# Patient Record
Sex: MALE | Race: ASIAN | NOT HISPANIC OR LATINO
[De-identification: names, ages, dates, MRNs, and addresses within clinical notes are randomized per-mention and may not be internally consistent; named-entity substitution may affect disease eponyms.]

---

## 2022-05-24 ENCOUNTER — APPOINTMENT (OUTPATIENT)
Dept: GASTROENTEROLOGY | Facility: CLINIC | Age: 67
End: 2022-05-24
Payer: MEDICAID

## 2022-05-24 VITALS
OXYGEN SATURATION: 98 % | DIASTOLIC BLOOD PRESSURE: 74 MMHG | SYSTOLIC BLOOD PRESSURE: 138 MMHG | WEIGHT: 158 LBS | HEIGHT: 64 IN | HEART RATE: 77 BPM | BODY MASS INDEX: 26.98 KG/M2

## 2022-05-24 DIAGNOSIS — F17.200 NICOTINE DEPENDENCE, UNSPECIFIED, UNCOMPLICATED: ICD-10-CM

## 2022-05-24 DIAGNOSIS — E11.9 TYPE 2 DIABETES MELLITUS W/OUT COMPLICATIONS: ICD-10-CM

## 2022-05-24 DIAGNOSIS — I25.10 ATHEROSCLEROTIC HEART DISEASE OF NATIVE CORONARY ARTERY W/OUT ANGINA PECTORIS: ICD-10-CM

## 2022-05-24 DIAGNOSIS — Z78.9 OTHER SPECIFIED HEALTH STATUS: ICD-10-CM

## 2022-05-24 DIAGNOSIS — M79.0 RHEUMATISM, UNSPECIFIED: ICD-10-CM

## 2022-05-24 PROBLEM — Z00.00 ENCOUNTER FOR PREVENTIVE HEALTH EXAMINATION: Status: ACTIVE | Noted: 2022-05-24

## 2022-05-24 PROCEDURE — 99204 OFFICE O/P NEW MOD 45 MIN: CPT

## 2022-05-25 NOTE — REVIEW OF SYSTEMS
[As Noted in HPI] : as noted in HPI [Dizziness] : dizziness [Fever] : no fever [Eyesight Problems] : no eyesight problems [Nosebleeds] : no nosebleeds [Nasal Discharge] : no nasal discharge [Chest Pain] : no chest pain [Cough] : no cough [SOB on Exertion] : no shortness of breath during exertion [Constipation] : no constipation [Hesitancy] : no urinary hesitancy [Nocturia] : no nocturia [Limb Pain] : no limb pain [Limb Swelling] : no limb swelling [Anxiety] : no anxiety [Muscle Weakness] : no muscle weakness [Easy Bleeding] : no tendency for easy bleeding [Easy Bruising] : no tendency for easy bruising

## 2022-05-25 NOTE — ASSESSMENT
[FreeTextEntry1] : 1.nausea and chronic gerd\par \par plan ppi daily\par  egd when cleared by cardiology and pulmonary, has audible wheezing\par \par 2. average risk for colon cancer recommend colonoscopy after clearance obtained

## 2022-05-25 NOTE — HISTORY OF PRESENT ILLNESS
[FreeTextEntry1] : 68 yo male with h/o DM ,CAD s/p stent 2014  on asa , patient reports early satiety and gas on pepcid. patient lost weight don't know how much over few years.  no nausea and vomiting. chronic constipation, no brbpr, no melena\par \par no h/o  egd/colonoscopy\par no family h/o colon or gastric cancer.

## 2022-05-25 NOTE — PHYSICAL EXAM
[General Appearance - Alert] : alert [General Appearance - In No Acute Distress] : in no acute distress [General Appearance - Well Nourished] : well nourished [General Appearance - Well Developed] : well developed [Sclera] : the sclera and conjunctiva were normal [Hearing Threshold Finger Rub Not [place]] : hearing was normal [Respiration, Rhythm And Depth] : normal respiratory rhythm and effort [Auscultation Breath Sounds / Voice Sounds] : lungs were clear to auscultation bilaterally [FreeTextEntry1] : audible wheezing [Heart Sounds] : normal S1 and S2 [Bowel Sounds] : normal bowel sounds [Abdomen Soft] : soft [Abdomen Tenderness] : non-tender [No CVA Tenderness] : no ~M costovertebral angle tenderness [Abnormal Walk] : normal gait [Nail Clubbing] : no clubbing  or cyanosis of the fingernails [Skin Color & Pigmentation] : normal skin color and pigmentation [] : no rash [Skin Lesions] : no skin lesions [No Focal Deficits] : no focal deficits [Oriented To Time, Place, And Person] : oriented to person, place, and time

## 2022-05-25 NOTE — REASON FOR VISIT
[Initial Evaluation] : an initial evaluation [Family Member] : family member [FreeTextEntry1] : abdominal pain

## 2023-01-05 ENCOUNTER — INPATIENT (INPATIENT)
Facility: HOSPITAL | Age: 68
LOS: 7 days | Discharge: HOME | End: 2023-01-13
Attending: STUDENT IN AN ORGANIZED HEALTH CARE EDUCATION/TRAINING PROGRAM | Admitting: STUDENT IN AN ORGANIZED HEALTH CARE EDUCATION/TRAINING PROGRAM
Payer: MEDICAID

## 2023-01-05 VITALS
WEIGHT: 154.32 LBS | HEART RATE: 79 BPM | RESPIRATION RATE: 18 BRPM | TEMPERATURE: 98 F | SYSTOLIC BLOOD PRESSURE: 158 MMHG | DIASTOLIC BLOOD PRESSURE: 82 MMHG | OXYGEN SATURATION: 100 %

## 2023-01-05 LAB
ALBUMIN SERPL ELPH-MCNC: 4 G/DL — SIGNIFICANT CHANGE UP (ref 3.3–5)
ALP SERPL-CCNC: 127 U/L — HIGH (ref 40–120)
ALT FLD-CCNC: 22 U/L — SIGNIFICANT CHANGE UP (ref 4–41)
ANION GAP SERPL CALC-SCNC: 16 MMOL/L — HIGH (ref 7–14)
AST SERPL-CCNC: 17 U/L — SIGNIFICANT CHANGE UP (ref 4–40)
BASE EXCESS BLDV CALC-SCNC: -9 MMOL/L — LOW (ref -2–3)
BILIRUB SERPL-MCNC: 0.2 MG/DL — SIGNIFICANT CHANGE UP (ref 0.2–1.2)
BLOOD GAS VENOUS COMPREHENSIVE RESULT: SIGNIFICANT CHANGE UP
BUN SERPL-MCNC: 16 MG/DL — SIGNIFICANT CHANGE UP (ref 7–23)
CALCIUM SERPL-MCNC: 9 MG/DL — SIGNIFICANT CHANGE UP (ref 8.4–10.5)
CHLORIDE BLDV-SCNC: 106 MMOL/L — SIGNIFICANT CHANGE UP (ref 96–108)
CHLORIDE SERPL-SCNC: 104 MMOL/L — SIGNIFICANT CHANGE UP (ref 98–107)
CO2 BLDV-SCNC: 19.3 MMOL/L — LOW (ref 22–26)
CO2 SERPL-SCNC: 18 MMOL/L — LOW (ref 22–31)
CREAT SERPL-MCNC: 1.07 MG/DL — SIGNIFICANT CHANGE UP (ref 0.5–1.3)
EGFR: 75 ML/MIN/1.73M2 — SIGNIFICANT CHANGE UP
FLUAV AG NPH QL: DETECTED
FLUBV AG NPH QL: SIGNIFICANT CHANGE UP
GAS PNL BLDV: 139 MMOL/L — SIGNIFICANT CHANGE UP (ref 136–145)
GLUCOSE BLDV-MCNC: 479 MG/DL — CRITICAL HIGH (ref 70–99)
GLUCOSE SERPL-MCNC: 343 MG/DL — HIGH (ref 70–99)
HCO3 BLDV-SCNC: 18 MMOL/L — LOW (ref 22–29)
HCT VFR BLD CALC: 37.9 % — LOW (ref 39–50)
HCT VFR BLDA CALC: 36 % — LOW (ref 39–51)
HGB BLD CALC-MCNC: 12.1 G/DL — LOW (ref 13–17)
HGB BLD-MCNC: 12.8 G/DL — LOW (ref 13–17)
LACTATE BLDV-MCNC: 5.9 MMOL/L — CRITICAL HIGH (ref 0.5–2)
MCHC RBC-ENTMCNC: 28.8 PG — SIGNIFICANT CHANGE UP (ref 27–34)
MCHC RBC-ENTMCNC: 33.8 GM/DL — SIGNIFICANT CHANGE UP (ref 32–36)
MCV RBC AUTO: 85.2 FL — SIGNIFICANT CHANGE UP (ref 80–100)
NRBC # BLD: 0 /100 WBCS — SIGNIFICANT CHANGE UP (ref 0–0)
NRBC # FLD: 0 K/UL — SIGNIFICANT CHANGE UP (ref 0–0)
PCO2 BLDV: 42 MMHG — SIGNIFICANT CHANGE UP (ref 42–55)
PH BLDV: 7.24 — LOW (ref 7.32–7.43)
PLATELET # BLD AUTO: 297 K/UL — SIGNIFICANT CHANGE UP (ref 150–400)
PO2 BLDV: 60 MMHG — SIGNIFICANT CHANGE UP
POTASSIUM BLDV-SCNC: 3.6 MMOL/L — SIGNIFICANT CHANGE UP (ref 3.5–5.1)
POTASSIUM SERPL-MCNC: 3.8 MMOL/L — SIGNIFICANT CHANGE UP (ref 3.5–5.3)
POTASSIUM SERPL-SCNC: 3.8 MMOL/L — SIGNIFICANT CHANGE UP (ref 3.5–5.3)
PROT SERPL-MCNC: 8.6 G/DL — HIGH (ref 6–8.3)
RBC # BLD: 4.45 M/UL — SIGNIFICANT CHANGE UP (ref 4.2–5.8)
RBC # FLD: 15.2 % — HIGH (ref 10.3–14.5)
RSV RNA NPH QL NAA+NON-PROBE: SIGNIFICANT CHANGE UP
SAO2 % BLDV: 84.7 % — SIGNIFICANT CHANGE UP
SARS-COV-2 RNA SPEC QL NAA+PROBE: SIGNIFICANT CHANGE UP
SODIUM SERPL-SCNC: 138 MMOL/L — SIGNIFICANT CHANGE UP (ref 135–145)
TROPONIN T, HIGH SENSITIVITY RESULT: 29 NG/L — SIGNIFICANT CHANGE UP
WBC # BLD: 13.28 K/UL — HIGH (ref 3.8–10.5)
WBC # FLD AUTO: 13.28 K/UL — HIGH (ref 3.8–10.5)

## 2023-01-05 PROCEDURE — 99285 EMERGENCY DEPT VISIT HI MDM: CPT

## 2023-01-05 PROCEDURE — 71045 X-RAY EXAM CHEST 1 VIEW: CPT | Mod: 26

## 2023-01-05 RX ORDER — IPRATROPIUM/ALBUTEROL SULFATE 18-103MCG
3 AEROSOL WITH ADAPTER (GRAM) INHALATION ONCE
Refills: 0 | Status: COMPLETED | OUTPATIENT
Start: 2023-01-05 | End: 2023-01-05

## 2023-01-05 RX ORDER — INSULIN HUMAN 100 [IU]/ML
5 INJECTION, SOLUTION SUBCUTANEOUS ONCE
Refills: 0 | Status: COMPLETED | OUTPATIENT
Start: 2023-01-05 | End: 2023-01-05

## 2023-01-05 RX ORDER — SODIUM CHLORIDE 9 MG/ML
500 INJECTION INTRAMUSCULAR; INTRAVENOUS; SUBCUTANEOUS ONCE
Refills: 0 | Status: COMPLETED | OUTPATIENT
Start: 2023-01-05 | End: 2023-01-05

## 2023-01-05 RX ORDER — SODIUM CHLORIDE 9 MG/ML
1000 INJECTION INTRAMUSCULAR; INTRAVENOUS; SUBCUTANEOUS ONCE
Refills: 0 | Status: COMPLETED | OUTPATIENT
Start: 2023-01-05 | End: 2023-01-05

## 2023-01-05 RX ORDER — MAGNESIUM SULFATE 500 MG/ML
2 VIAL (ML) INJECTION ONCE
Refills: 0 | Status: COMPLETED | OUTPATIENT
Start: 2023-01-05 | End: 2023-01-05

## 2023-01-05 RX ORDER — DEXAMETHASONE 0.5 MG/5ML
6 ELIXIR ORAL ONCE
Refills: 0 | Status: COMPLETED | OUTPATIENT
Start: 2023-01-05 | End: 2023-01-05

## 2023-01-05 RX ADMIN — Medication 6 MILLIGRAM(S): at 20:12

## 2023-01-05 RX ADMIN — Medication 150 GRAM(S): at 21:31

## 2023-01-05 RX ADMIN — Medication 3 MILLILITER(S): at 21:32

## 2023-01-05 RX ADMIN — Medication 3 MILLILITER(S): at 21:45

## 2023-01-05 RX ADMIN — SODIUM CHLORIDE 500 MILLILITER(S): 9 INJECTION INTRAMUSCULAR; INTRAVENOUS; SUBCUTANEOUS at 20:12

## 2023-01-05 RX ADMIN — SODIUM CHLORIDE 500 MILLILITER(S): 9 INJECTION INTRAMUSCULAR; INTRAVENOUS; SUBCUTANEOUS at 22:00

## 2023-01-05 RX ADMIN — Medication 3 MILLILITER(S): at 22:00

## 2023-01-05 NOTE — ED PROVIDER NOTE - ATTENDING CONTRIBUTION TO CARE
I performed a face-to-face evaluation of the patient and performed a history and physical examination. I agree with the history and physical examination. If this was a PA visit, I personally saw the patient with the PA and performed a substantive portion of the visit including all aspects of the medical decision making.    History of asthma, diabetes, and coronary artery disease. Has one stent. Presents with 2 to 3 days of shortness of breath, wheezing, and cough. Nausea. No vomiting. Also has left side of chest pain. Medication‘s are metformin 1 g twice daily, Reglan, 10 mg every night for gastroparesis, Singulair 10 mg daily, Norvasc, 10 mg daily, clonidine, 0.1 mg twice daily, losartan 100 mg daily, Glyburide, 5 mg twice daily, Januvia, 100 mg daily, albuterol inhaler, Symbicort 160 mg/4.5 mg. The patient has never been hospitalized for asthma. Given diffuse, wheezing and absence of leg swelling, suspect, asthma exacerbation. Treat with nebulizers, steroids, magnesium. Chest x-ray. RVP. Admit.

## 2023-01-05 NOTE — ED PROVIDER NOTE - OBJECTIVE STATEMENT
John: History of asthma, diabetes, and coronary artery disease. Has one stent. Presents with 2 to 3 days of shortness of breath, wheezing, and cough. Nausea. No vomiting. Also has left side of chest pain. Medication‘s are metformin 1 g twice daily, Reglan, 10 mg every night for gastroparesis, Singulair 10 mg daily, Norvasc, 10 mg daily, clonidine, 0.1 mg twice daily, losartan 100 mg daily, Glyburide, 5 mg twice daily, Januvia, 100 mg daily, albuterol inhaler, Symbicort 160 mg/4.5 mg. The patient has never been hospitalized for asthma.

## 2023-01-05 NOTE — ED PROVIDER NOTE - EKG ADDITIONAL INFORMATION FREE TEXT
John: No hyper-acute T waves, no malignant dysrhythmia, no ischemic ST segment changes. Tachycardic.

## 2023-01-05 NOTE — ED PROVIDER NOTE - PHYSICAL EXAMINATION
Ill-appearing, well nourished, awake, alert, oriented to person, place, time/situation and in mild respiratory distress.    Airway patent. Neck supple.    Eyes without scleral injection. No jaundice.    Strong pulse. Tachycardic.    Respirations labored. Diffuse wheezing.    Abdomen soft, non-tender, no guarding.    Spine appears normal, range of motion is not limited, no muscle or joint tenderness. No LE edema.     Alert and oriented, no gross motor or sensory deficits.    Skin normal color for race, warm, dry and intact. No evidence of rash.    No SI/HI.

## 2023-01-05 NOTE — ED PROVIDER NOTE - CLINICAL SUMMARY MEDICAL DECISION MAKING FREE TEXT BOX
John: History of asthma, diabetes, and coronary artery disease. Has one stent. Presents with 2 to 3 days of shortness of breath, wheezing, and cough. Nausea. No vomiting. Also has left side of chest pain. Medication‘s are metformin 1 g twice daily, Reglan, 10 mg every night for gastroparesis, Singulair 10 mg daily, Norvasc, 10 mg daily, clonidine, 0.1 mg twice daily, losartan 100 mg daily, Glyburide, 5 mg twice daily, Januvia, 100 mg daily, albuterol inhaler, Symbicort 160 mg/4.5 mg. The patient has never been hospitalized for asthma. Given diffuse, wheezing and absence of leg swelling, suspect, asthma exacerbation. Treat with nebulizers, steroids, magnesium. Chest x-ray. RVP. Admit.

## 2023-01-05 NOTE — ED PROVIDER NOTE - PROGRESS NOTE DETAILS
Kushal: Pt seen and eval at [facility] triage. Pt, Occitan speaking, has a h/o asthma, DM, HTN, CAD s/p cath/stent in 2015 p/w two to three days of SSCP, SOB and cough. He denies fever/chills, abd pain, n/v, diaphoresis.  exam: Mild tachypnea, PERRL/EOMI, supple, RRR, poor airway movement with exp wheezing; Abd-soft, NT/ND, no LE edema, +2 DP/PT; A&Ox3, nonfocal  ordered: EKG, labs, steroids, dune, CXR  Care transferred to the red team in the main ED    [facility] [id number] [facility] Me[facility], DO PGY-3: Per son and per medicaiton review pt has no history of afib or AC use other than asa, in afib w/ rvr on ekg Mele Granado, DO PGY-3: Per son and per medicaiton review pt has no history of afib or AC use other than asa, in afib w/ rvr on ekg, will start on heparin, admit to tele pt admitted,  boarding in ED room 1. ENT Dr Concepcion, scoped pt by mistake. PT and son  (Cindy) made aware, escalated to manager Dianne, completed incident report # [id number]  pt no distress at this time. no work of breathing. no neck pain. no drooling. vss.

## 2023-01-05 NOTE — ED ADULT NURSE NOTE - OBJECTIVE STATEMENT
Received 66y/o M Hx asthma to room 1. Received pt with IV access established, and initial workup complete. Pt came to ED with c/o SOB, productive cough, wheezing, nausea, chest tightness x 2 days. Pt on cardiac monitor; noted to be tachycardic & tachypneic. Pt initiated on duoneb TX, as well as Magnesium IV drip as per provider order. Pt A&Ox4. Safety maintained.

## 2023-01-05 NOTE — ED ADULT TRIAGE NOTE - CHIEF COMPLAINT QUOTE
Pt c/o shortness of breath & chest tightness x 2 days w/ productive cough. No relief from inhaler at home. Received 2 duonebs by EMS. O2 sat on room air mid 80s. On NRB pt 100%. PMHx Asthma, HTN, HLD, DM

## 2023-01-06 DIAGNOSIS — A41.9 SEPSIS, UNSPECIFIED ORGANISM: ICD-10-CM

## 2023-01-06 DIAGNOSIS — J45.901 UNSPECIFIED ASTHMA WITH (ACUTE) EXACERBATION: ICD-10-CM

## 2023-01-06 DIAGNOSIS — I16.0 HYPERTENSIVE URGENCY: ICD-10-CM

## 2023-01-06 DIAGNOSIS — I25.10 ATHEROSCLEROTIC HEART DISEASE OF NATIVE CORONARY ARTERY WITHOUT ANGINA PECTORIS: ICD-10-CM

## 2023-01-06 DIAGNOSIS — I48.91 UNSPECIFIED ATRIAL FIBRILLATION: ICD-10-CM

## 2023-01-06 DIAGNOSIS — J10.1 INFLUENZA DUE TO OTHER IDENTIFIED INFLUENZA VIRUS WITH OTHER RESPIRATORY MANIFESTATIONS: ICD-10-CM

## 2023-01-06 DIAGNOSIS — J96.01 ACUTE RESPIRATORY FAILURE WITH HYPOXIA: ICD-10-CM

## 2023-01-06 DIAGNOSIS — E11.65 TYPE 2 DIABETES MELLITUS WITH HYPERGLYCEMIA: ICD-10-CM

## 2023-01-06 DIAGNOSIS — Z29.9 ENCOUNTER FOR PROPHYLACTIC MEASURES, UNSPECIFIED: ICD-10-CM

## 2023-01-06 LAB
ALBUMIN SERPL ELPH-MCNC: 3.6 G/DL — SIGNIFICANT CHANGE UP (ref 3.3–5)
ALP SERPL-CCNC: 109 U/L — SIGNIFICANT CHANGE UP (ref 40–120)
ALT FLD-CCNC: 17 U/L — SIGNIFICANT CHANGE UP (ref 4–41)
ANION GAP SERPL CALC-SCNC: 12 MMOL/L — SIGNIFICANT CHANGE UP (ref 7–14)
APTT BLD: 26.3 SEC — LOW (ref 27–36.3)
AST SERPL-CCNC: 11 U/L — SIGNIFICANT CHANGE UP (ref 4–40)
BILIRUB SERPL-MCNC: 0.2 MG/DL — SIGNIFICANT CHANGE UP (ref 0.2–1.2)
BLOOD GAS VENOUS COMPREHENSIVE RESULT: SIGNIFICANT CHANGE UP
BUN SERPL-MCNC: 19 MG/DL — SIGNIFICANT CHANGE UP (ref 7–23)
CALCIUM SERPL-MCNC: 9 MG/DL — SIGNIFICANT CHANGE UP (ref 8.4–10.5)
CHLORIDE SERPL-SCNC: 104 MMOL/L — SIGNIFICANT CHANGE UP (ref 98–107)
CO2 SERPL-SCNC: 22 MMOL/L — SIGNIFICANT CHANGE UP (ref 22–31)
CREAT SERPL-MCNC: 0.97 MG/DL — SIGNIFICANT CHANGE UP (ref 0.5–1.3)
D DIMER BLD IA.RAPID-MCNC: 265 NG/ML DDU — HIGH
EGFR: 85 ML/MIN/1.73M2 — SIGNIFICANT CHANGE UP
GLUCOSE BLDC GLUCOMTR-MCNC: 304 MG/DL — HIGH (ref 70–99)
GLUCOSE BLDC GLUCOMTR-MCNC: 309 MG/DL — HIGH (ref 70–99)
GLUCOSE BLDC GLUCOMTR-MCNC: 311 MG/DL — HIGH (ref 70–99)
GLUCOSE BLDC GLUCOMTR-MCNC: 360 MG/DL — HIGH (ref 70–99)
GLUCOSE SERPL-MCNC: 301 MG/DL — HIGH (ref 70–99)
INR BLD: 1.06 RATIO — SIGNIFICANT CHANGE UP (ref 0.88–1.16)
LACTATE SERPL-SCNC: 3.6 MMOL/L — HIGH (ref 0.5–2)
MAGNESIUM SERPL-MCNC: 2.2 MG/DL — SIGNIFICANT CHANGE UP (ref 1.6–2.6)
NT-PROBNP SERPL-SCNC: 946 PG/ML — HIGH
PHOSPHATE SERPL-MCNC: 2.5 MG/DL — SIGNIFICANT CHANGE UP (ref 2.5–4.5)
POTASSIUM SERPL-MCNC: 3.6 MMOL/L — SIGNIFICANT CHANGE UP (ref 3.5–5.3)
POTASSIUM SERPL-SCNC: 3.6 MMOL/L — SIGNIFICANT CHANGE UP (ref 3.5–5.3)
PROCALCITONIN SERPL-MCNC: 0.07 NG/ML — SIGNIFICANT CHANGE UP (ref 0.02–0.1)
PROT SERPL-MCNC: 7.5 G/DL — SIGNIFICANT CHANGE UP (ref 6–8.3)
PROTHROM AB SERPL-ACNC: 12.3 SEC — SIGNIFICANT CHANGE UP (ref 10.5–13.4)
SODIUM SERPL-SCNC: 138 MMOL/L — SIGNIFICANT CHANGE UP (ref 135–145)
TROPONIN T, HIGH SENSITIVITY RESULT: 24 NG/L — SIGNIFICANT CHANGE UP

## 2023-01-06 PROCEDURE — 99223 1ST HOSP IP/OBS HIGH 75: CPT

## 2023-01-06 PROCEDURE — 99223 1ST HOSP IP/OBS HIGH 75: CPT | Mod: GC

## 2023-01-06 RX ORDER — SODIUM CHLORIDE 9 MG/ML
1000 INJECTION, SOLUTION INTRAVENOUS
Refills: 0 | Status: DISCONTINUED | OUTPATIENT
Start: 2023-01-06 | End: 2023-01-13

## 2023-01-06 RX ORDER — AMLODIPINE BESYLATE 2.5 MG/1
10 TABLET ORAL DAILY
Refills: 0 | Status: DISCONTINUED | OUTPATIENT
Start: 2023-01-06 | End: 2023-01-13

## 2023-01-06 RX ORDER — IPRATROPIUM/ALBUTEROL SULFATE 18-103MCG
3 AEROSOL WITH ADAPTER (GRAM) INHALATION ONCE
Refills: 0 | Status: COMPLETED | OUTPATIENT
Start: 2023-01-06 | End: 2023-01-06

## 2023-01-06 RX ORDER — INSULIN GLARGINE 100 [IU]/ML
15 INJECTION, SOLUTION SUBCUTANEOUS EVERY MORNING
Refills: 0 | Status: DISCONTINUED | OUTPATIENT
Start: 2023-01-07 | End: 2023-01-07

## 2023-01-06 RX ORDER — HEPARIN SODIUM 5000 [USP'U]/ML
INJECTION INTRAVENOUS; SUBCUTANEOUS
Qty: 25000 | Refills: 0 | Status: DISCONTINUED | OUTPATIENT
Start: 2023-01-06 | End: 2023-01-06

## 2023-01-06 RX ORDER — INSULIN LISPRO 100/ML
VIAL (ML) SUBCUTANEOUS
Refills: 0 | Status: DISCONTINUED | OUTPATIENT
Start: 2023-01-06 | End: 2023-01-11

## 2023-01-06 RX ORDER — DEXTROSE 50 % IN WATER 50 %
15 SYRINGE (ML) INTRAVENOUS ONCE
Refills: 0 | Status: DISCONTINUED | OUTPATIENT
Start: 2023-01-06 | End: 2023-01-13

## 2023-01-06 RX ORDER — HEPARIN SODIUM 5000 [USP'U]/ML
5500 INJECTION INTRAVENOUS; SUBCUTANEOUS EVERY 6 HOURS
Refills: 0 | Status: DISCONTINUED | OUTPATIENT
Start: 2023-01-06 | End: 2023-01-06

## 2023-01-06 RX ORDER — BUDESONIDE AND FORMOTEROL FUMARATE DIHYDRATE 160; 4.5 UG/1; UG/1
2 AEROSOL RESPIRATORY (INHALATION)
Refills: 0 | Status: DISCONTINUED | OUTPATIENT
Start: 2023-01-06 | End: 2023-01-13

## 2023-01-06 RX ORDER — METOCLOPRAMIDE HCL 10 MG
10 TABLET ORAL AT BEDTIME
Refills: 0 | Status: DISCONTINUED | OUTPATIENT
Start: 2023-01-06 | End: 2023-01-13

## 2023-01-06 RX ORDER — INSULIN GLARGINE 100 [IU]/ML
10 INJECTION, SOLUTION SUBCUTANEOUS ONCE
Refills: 0 | Status: COMPLETED | OUTPATIENT
Start: 2023-01-06 | End: 2023-01-06

## 2023-01-06 RX ORDER — GLUCAGON INJECTION, SOLUTION 0.5 MG/.1ML
1 INJECTION, SOLUTION SUBCUTANEOUS ONCE
Refills: 0 | Status: DISCONTINUED | OUTPATIENT
Start: 2023-01-06 | End: 2023-01-13

## 2023-01-06 RX ORDER — DEXTROSE 50 % IN WATER 50 %
25 SYRINGE (ML) INTRAVENOUS ONCE
Refills: 0 | Status: DISCONTINUED | OUTPATIENT
Start: 2023-01-06 | End: 2023-01-13

## 2023-01-06 RX ORDER — HEPARIN SODIUM 5000 [USP'U]/ML
5500 INJECTION INTRAVENOUS; SUBCUTANEOUS ONCE
Refills: 0 | Status: COMPLETED | OUTPATIENT
Start: 2023-01-06 | End: 2023-01-06

## 2023-01-06 RX ORDER — INSULIN GLARGINE 100 [IU]/ML
5 INJECTION, SOLUTION SUBCUTANEOUS ONCE
Refills: 0 | Status: COMPLETED | OUTPATIENT
Start: 2023-01-06 | End: 2023-01-06

## 2023-01-06 RX ORDER — IPRATROPIUM/ALBUTEROL SULFATE 18-103MCG
3 AEROSOL WITH ADAPTER (GRAM) INHALATION EVERY 6 HOURS
Refills: 0 | Status: DISCONTINUED | OUTPATIENT
Start: 2023-01-06 | End: 2023-01-06

## 2023-01-06 RX ORDER — IPRATROPIUM/ALBUTEROL SULFATE 18-103MCG
3 AEROSOL WITH ADAPTER (GRAM) INHALATION EVERY 4 HOURS
Refills: 0 | Status: DISCONTINUED | OUTPATIENT
Start: 2023-01-06 | End: 2023-01-07

## 2023-01-06 RX ORDER — DEXTROSE 50 % IN WATER 50 %
12.5 SYRINGE (ML) INTRAVENOUS ONCE
Refills: 0 | Status: DISCONTINUED | OUTPATIENT
Start: 2023-01-06 | End: 2023-01-13

## 2023-01-06 RX ORDER — INSULIN LISPRO 100/ML
5 VIAL (ML) SUBCUTANEOUS
Refills: 0 | Status: DISCONTINUED | OUTPATIENT
Start: 2023-01-06 | End: 2023-01-07

## 2023-01-06 RX ORDER — HEPARIN SODIUM 5000 [USP'U]/ML
2500 INJECTION INTRAVENOUS; SUBCUTANEOUS EVERY 6 HOURS
Refills: 0 | Status: DISCONTINUED | OUTPATIENT
Start: 2023-01-06 | End: 2023-01-06

## 2023-01-06 RX ORDER — MONTELUKAST 4 MG/1
10 TABLET, CHEWABLE ORAL DAILY
Refills: 0 | Status: DISCONTINUED | OUTPATIENT
Start: 2023-01-06 | End: 2023-01-13

## 2023-01-06 RX ORDER — ENOXAPARIN SODIUM 100 MG/ML
40 INJECTION SUBCUTANEOUS EVERY 24 HOURS
Refills: 0 | Status: DISCONTINUED | OUTPATIENT
Start: 2023-01-06 | End: 2023-01-07

## 2023-01-06 RX ORDER — LOSARTAN POTASSIUM 100 MG/1
100 TABLET, FILM COATED ORAL DAILY
Refills: 0 | Status: DISCONTINUED | OUTPATIENT
Start: 2023-01-06 | End: 2023-01-13

## 2023-01-06 RX ADMIN — Medication 75 MILLIGRAM(S): at 18:33

## 2023-01-06 RX ADMIN — Medication 3 MILLILITER(S): at 22:22

## 2023-01-06 RX ADMIN — Medication 0.1 MILLIGRAM(S): at 18:35

## 2023-01-06 RX ADMIN — Medication 40 MILLIGRAM(S): at 18:33

## 2023-01-06 RX ADMIN — Medication 5 UNIT(S): at 15:00

## 2023-01-06 RX ADMIN — INSULIN HUMAN 5 UNIT(S): 100 INJECTION, SOLUTION SUBCUTANEOUS at 00:26

## 2023-01-06 RX ADMIN — Medication 10 MILLIGRAM(S): at 22:21

## 2023-01-06 RX ADMIN — INSULIN GLARGINE 5 UNIT(S): 100 INJECTION, SOLUTION SUBCUTANEOUS at 03:47

## 2023-01-06 RX ADMIN — LOSARTAN POTASSIUM 100 MILLIGRAM(S): 100 TABLET, FILM COATED ORAL at 15:25

## 2023-01-06 RX ADMIN — HEPARIN SODIUM 5500 UNIT(S): 5000 INJECTION INTRAVENOUS; SUBCUTANEOUS at 05:50

## 2023-01-06 RX ADMIN — Medication 3 MILLILITER(S): at 14:19

## 2023-01-06 RX ADMIN — Medication 5 UNIT(S): at 19:38

## 2023-01-06 RX ADMIN — Medication 0.1 MILLIGRAM(S): at 10:47

## 2023-01-06 RX ADMIN — Medication 75 MILLIGRAM(S): at 05:52

## 2023-01-06 RX ADMIN — Medication 3 MILLILITER(S): at 03:41

## 2023-01-06 RX ADMIN — AMLODIPINE BESYLATE 10 MILLIGRAM(S): 2.5 TABLET ORAL at 15:21

## 2023-01-06 RX ADMIN — Medication 3 MILLILITER(S): at 17:05

## 2023-01-06 RX ADMIN — Medication 3 MILLILITER(S): at 10:28

## 2023-01-06 RX ADMIN — Medication 8: at 14:58

## 2023-01-06 RX ADMIN — Medication 40 MILLIGRAM(S): at 10:46

## 2023-01-06 RX ADMIN — Medication 8: at 22:22

## 2023-01-06 RX ADMIN — HEPARIN SODIUM 1200 UNIT(S)/HR: 5000 INJECTION INTRAVENOUS; SUBCUTANEOUS at 05:53

## 2023-01-06 RX ADMIN — ENOXAPARIN SODIUM 40 MILLIGRAM(S): 100 INJECTION SUBCUTANEOUS at 14:21

## 2023-01-06 RX ADMIN — INSULIN GLARGINE 10 UNIT(S): 100 INJECTION, SOLUTION SUBCUTANEOUS at 15:53

## 2023-01-06 RX ADMIN — Medication 8: at 19:38

## 2023-01-06 RX ADMIN — MONTELUKAST 10 MILLIGRAM(S): 4 TABLET, CHEWABLE ORAL at 15:20

## 2023-01-06 RX ADMIN — Medication 75 MILLIGRAM(S): at 00:27

## 2023-01-06 RX ADMIN — BUDESONIDE AND FORMOTEROL FUMARATE DIHYDRATE 2 PUFF(S): 160; 4.5 AEROSOL RESPIRATORY (INHALATION) at 22:21

## 2023-01-06 NOTE — ED ADULT NURSE REASSESSMENT NOTE - NS ED NURSE REASSESS COMMENT FT1
Pt is resting comfortably on room air SpO2% is 97. Pt states chest pain is 2/10. Labs drawn and IV 20 gauged placed. Will continue to monitor. Pt is resting comfortably on room air SpO2% is 97 with respirations of 20 bpm. Pt states chest pain is improving 2/10 pain, and remains on tele monitor. Labs drawn and IV 20 gauged placed. Will continue to monitor.

## 2023-01-06 NOTE — H&P ADULT - PROBLEM SELECTOR PLAN 6
BP elevated to 200s  - Resume home Clonidine 0.1mg BID, Losartan 100mg qD and Amlodipine 10mg qD w/hold parameters   - Would also add Hydralazine 10mg IV q6h prn for SBP >180

## 2023-01-06 NOTE — H&P ADULT - NSHPPHYSICALEXAM_GEN_ALL_CORE
.  VITAL SIGNS:  T(C): 36.4 (01-06-23 @ 02:07), Max: 36.8 (01-05-23 @ 17:10)  T(F): 97.6 (01-06-23 @ 02:07), Max: 98.2 (01-05-23 @ 17:10)  HR: 89 (01-06-23 @ 07:55) (69 - 135)  BP: 184/89 (01-06-23 @ 02:07) (139/99 - 184/89)  BP(mean): --  RR: 14 (01-06-23 @ 05:45) (14 - 18)  SpO2: 100% (01-06-23 @ 07:55) (96% - 100%)  Wt(kg): --    PHYSICAL EXAM:    Constitutional: WDWN resting comfortably in bed; NAD  Head: NC/AT  Eyes: PERRL, EOMI, clear conjunctiva  ENT: no nasal discharge; uvula midline, no oropharyngeal erythema or exudates; MMM  Neck: supple; no JVD or thyromegaly  Respiratory: CTA B/L; no W/R/R, no retractions  Cardiac: +S1/S2; RRR; no M/R/G; PMI non-displaced  Gastrointestinal: soft, NT/ND; no rebound or guarding; +BSx4  Genitourinary: normal external genitalia  Back: spine midline, no bony tenderness or step-offs; no CVAT B/L  Extremities: WWP, no clubbing or cyanosis; no peripheral edema  Musculoskeletal: NROM x4; no joint swelling, tenderness or erythema  Vascular: 2+ radial, femoral, DP/PT pulses B/L  Dermatologic: skin warm, dry and intact; no rashes, wounds, or scars  Lymphatic: no submandibular or cervical LAD  Neurologic: AAOx3; CNII-XII grossly intact; no focal deficits  Psychiatric: affect and characteristics of appearance, verbalizations, behaviors are appropriate .  VITAL SIGNS:  T(C): 36.4 (01-06-23 @ 02:07), Max: 36.8 (01-05-23 @ 17:10)  T(F): 97.6 (01-06-23 @ 02:07), Max: 98.2 (01-05-23 @ 17:10)  HR: 89 (01-06-23 @ 07:55) (69 - 135)  BP: 184/89 (01-06-23 @ 02:07) (139/99 - 184/89)  BP(mean): --  RR: 14 (01-06-23 @ 05:45) (14 - 18)  SpO2: 100% (01-06-23 @ 07:55) (96% - 100%)  Wt(kg): --    PHYSICAL EXAM:    Constitutional: mildly uncomfortable, sitting in stretcher, not on oxygen   Head: NC/AT  Eyes: PERRL, EOMI, clear conjunctiva  ENT: no nasal discharge; poor dentition  Neck: supple; no JVD  Respiratory: diffuse expiratory wheezing, tachypneic, no accessory muscle use.    Cardiac: +S1/S2; RRR; no M/R/G  Gastrointestinal: soft, NT/ND; no rebound or guarding; +BSx4  Back: spine midline, no bony tenderness or step-offs; no CVAT B/L  Extremities: WWP, no clubbing or cyanosis; no peripheral edema  Musculoskeletal: NROM x4; no joint swelling, tenderness or erythema  Vascular: 2+ radial, femoral, DP/PT pulses B/L  Dermatologic: skin warm, dry   Neurologic: AAOx3; CNII-XII grossly intact; no focal deficits  Psychiatric: affect and characteristics of appearance, verbalizations, behaviors are appropriate

## 2023-01-06 NOTE — CONSULT NOTE ADULT - SUBJECTIVE AND OBJECTIVE BOX
Cardiovascular Disease Initial Evaluation  Date of service: 01-06-23 @ 10:30    CHIEF COMPLAINT: SOB    HISTORY OF PRESENT ILLNESS:  Mr. Crareon is a 67M with history of CAD s/p PCI circa 2014 in [facility], HTN, Asthma, and DM who presents with SOB for the past week. Son is at bedside who assisted with history. Pt has been having increasing SOB associated with nausea and chest pain over the past 2 days which prompted his visit to the ED. Pt admits that he has been using his rescue inhaler more frequently over the past several days with minimal relief. Upon arrival pt was found to be hypoxic. Pt was also found to be in rapid afib. Mr. Carreon was immediately placed on HFNC with improvement in oxygenation and restoration of sinus rhythm on telemetry. Cardiology consulted for further management.     OF note, pt follows with cardiologist Dr. Gomes. Son states that he has a stress done one month ago and it was normal.       Allergies    No Known Allergies    Intolerances    	    MEDICATIONS:  cloNIDine 0.1 milliGRAM(s) Oral once  heparin   Injectable 5500 Unit(s) IV Push every 6 hours PRN  heparin   Injectable 2500 Unit(s) IV Push every 6 hours PRN  heparin  Infusion.  Unit(s)/Hr IV Continuous <Continuous>  losartan 100 milliGRAM(s) Oral daily    oseltamivir 75 milliGRAM(s) Oral two times a day    albuterol/ipratropium for Nebulization 3 milliLiter(s) Nebulizer every 6 hours  montelukast 10 milliGRAM(s) Oral daily        dextrose 50% Injectable 25 Gram(s) IV Push once  dextrose 50% Injectable 12.5 Gram(s) IV Push once  dextrose 50% Injectable 25 Gram(s) IV Push once  dextrose Oral Gel 15 Gram(s) Oral once PRN  glucagon  Injectable 1 milliGRAM(s) IntraMuscular once  insulin lispro (ADMELOG) corrective regimen sliding scale   SubCutaneous Before meals and at bedtime  methylPREDNISolone sodium succinate Injectable 40 milliGRAM(s) IV Push two times a day  methylPREDNISolone sodium succinate IVPB 40 milliGRAM(s) IV Intermittent once    dextrose 5%. 1000 milliLiter(s) IV Continuous <Continuous>  dextrose 5%. 1000 milliLiter(s) IV Continuous <Continuous>      PAST MEDICAL & SURGICAL HISTORY:  Type 2 diabetes mellitus      CAD (coronary artery disease)      Asthma      Essential hypertension          FAMILY HISTORY:      SOCIAL HISTORY:    The patient is a nonsmoker       REVIEW OF SYSTEMS:  See HPI, otherwise complete 14 point review of systems negative    [ x] All others negative	  [ ] Unable to obtain    PHYSICAL EXAM:  T(C): 37.1 (01-06-23 @ 08:57), Max: 37.1 (01-06-23 @ 08:57)  HR: 77 (01-06-23 @ 08:57) (69 - 135)  BP: 152/68 (01-06-23 @ 08:57) (139/99 - 184/89)  RR: 20 (01-06-23 @ 08:57) (14 - 20)  SpO2: 98% (01-06-23 @ 08:57) (96% - 100%)  Wt(kg): --  I&O's Summary      Appearance: Pt in mild distress. Conversational dyspnea   HEENT:  Normal oral mucosa, PERRL, EOMI	  Cardiovascular: Normal S1 S2, No JVD, No murmurs/rubs/gallops  Respiratory: Audible diffuse wheezing throughout lung fields.   Gastrointestinal:  Soft, Non-tender, + BS	  Skin: No rashes, No ecchymoses, No cyanosis	  Neurologic: Non-focal; no weakness  Extremities: No clubbing, cyanosis or edema  Vascular: Peripheral pulses palpable 2+ bilaterally  Psychiatry: A & O x 3, Mood & affect appropriate    Laboratory Data:	 	    CBC Full  -  ( 05 Jan 2023 20:12 )  WBC Count : 13.28 K/uL  Hemoglobin : 12.8 g/dL  Hematocrit : 37.9 %  Platelet Count - Automated : 297 K/uL  Mean Cell Volume : 85.2 fL  Mean Cell Hemoglobin : 28.8 pg  Mean Cell Hemoglobin Concentration : 33.8 gm/dL  Auto Neutrophil # : x  Auto Lymphocyte # : x  Auto Monocyte # : x  Auto Eosinophil # : x  Auto Basophil # : x  Auto Neutrophil % : x  Auto Lymphocyte % : x  Auto Monocyte % : x  Auto Eosinophil % : x  Auto Basophil % : x    01-05    138  |  104  |  16  ----------------------------<  343<H>  3.8   |  18<L>  |  1.07    Ca    9.0      05 Jan 2023 20:12  Mg     2.70     01-05    TPro  8.6<H>  /  Alb  4.0  /  TBili  0.2  /  DBili  x   /  AST  17  /  ALT  22  /  AlkPhos  127<H>  01-05      proBNP:   Lipid Profile:   HgA1c:   TSH:       CARDIAC MARKERS: Trop T 29-24            Interpretation of Telemetry: Sinus     ECG: Afib RVR  RADIOLOGY:  OTHER: 	    PREVIOUS DIAGNOSTIC TESTING:    [ ] Echocardiogram:  [ ] Catheterization:  [ ] Stress Test:  	    Assessment:  Mr. Carreon is a 67M with history of CAD s/p PCI circa 2014 in [facility], HTN, Asthma, and DM who presents with SOB for the past week.    Plan of Care:    #Afib RVR  - In setting of hypoxia  - Now resolved, currently in sinus on telemetry  - Would not commit patient to long term anticoagulation at this time unless there is recurrence     #HTN urgency  - BP elevated  - Pt has not received antihypertensive medications today which include, Clonidine 0.1mg BID, Norvasc 10mg daily and Losartan 100mg daily  - Recommend resumption of home medications immediately  - Would also add Hydralazine 10mg IV q6h prn for SBP >180    #Hypoxic respiratory failure  - 2/2 influenza  - Pt now off HFNC on room air  - Management as per primary team    #CAD  - S/p PCI in 2014   - ASA 81mg daily  - Obtain TTE    #ACP (advance care planning)-  Advanced care planning was discussed with the patient and son.  Risks, benefits and alternatives of medical treatment and procedures were discussed in detail and all questions were answered. 30 additional minutes spent addressing advance care plans.      72 minutes spent on total encounter; more than 50% of the visit was spent counseling and/or coordinating care by the attending physician.   	  Glenn Louis DO [facility]  Cardiovascular Diseases  [phone]

## 2023-01-06 NOTE — ED ADULT NURSE REASSESSMENT NOTE - NS ED NURSE REASSESS COMMENT FT1
Pt is a 67 year old male AxO4 complaining of SOB and chest pain for the past 2 days. He rates the chest pain as a 4 out of 10. PMH of Asthma, HTN, HLD, and DM. The Patient is on room air with SpO2 of 98. He denies any N/V, abdominal pain, headache or dizziness. Skin is warm dry and intact. Expiatory wheezing noted on auscultated breath sounds. Pt recently diagnosed with A-fibb and the flu. Heparin drip currently running in the left IV site. On Cardiac monitor. Will continue to monitor the patient. Pt is a 67 year old male AxO4 complaining of SOB and chest pain for the past 2 days. He rates the chest pain as a 4/10. PMH of Asthma, HTN, HLD, and DM. The Patient is on room air with SpO2 of 98. He denies any N/V, abdominal pain, headache or dizziness. Skin is warm dry and intact. Expiatory wheezing noted on auscultated breath sounds. Pt recently diagnosed with A-fibb and the flu. Heparin drip currently running in the left IV site. On Cardiac monitor. Will continue to monitor the patient. Pt on droplet precautions. Pt is a 67 year old male AxO4 complaining of SOB and chest pain for the past 2 days. He rates the chest pain as a 4/10. PMH of Asthma, HTN, HLD, and DM. The Patient is on room air with SpO2 of 98, and RR at 20 breaths per minute. Expiatory wheezing noted on auscultated breath sounds He denies any N/V, abdominal pain, headache or dizziness. Skin is warm dry and intact. Pt recently diagnosed with A-fibb (pt on tele monitor) and the flu. Heparin drip currently running in the left IV site. Will continue to monitor the patient. Pt on droplet precautions.

## 2023-01-06 NOTE — H&P ADULT - HISTORY OF PRESENT ILLNESS
67M w/asthma, diabetes, HTN, CAD s/p 1 stent, presents with 2 to 3 days of shortness of breath, wheezing, and cough. Reports nausea, no vomiting and left sided of chest pain.   Son at bedside to translate per pt preference. Cindy ([phone])  67M w/asthma, diabetes, HTN, CAD s/p 1 stent, presents with 2 to 3 days of shortness of breath, wheezing, and cough. Reports nausea, no vomiting and left sided of chest pain.   Per son, pt sob started 2-3 days ago and he started using ProAir more with minor relief. Chest pain has no resolved.  Normally he, uses his ProAir from time to time if he gets sob, used to see pulmonologist, but overall asthma has been controlled on Symbicort and Singulair.   Pt was brought to ER via EMS on NRB, documentation shows desaturation to 80s. Pt was given 2 500cc boluses, Duoneb x5, Decadron 6mg x1 and insulin 5U for hyperglycemia.   Pt started on heparin gtt for rapid atrial fibrillation.

## 2023-01-06 NOTE — H&P ADULT - PROBLEM SELECTOR PLAN 3
Per chart, pt hypoxic to 80s with EMS, was on NRB. Was also on HFNC earlier? but now 98% on room air, but still tachypneic w/diffuse wheezing   -suspect asthma exacerbation, but will obtain D-dimer and Pro-BNP  -CXR reviewed   -s/p Duoneb and Decadron in ER  -C/w IV Solumedrol 40mg BID, Singulair, Symbicort and Duoneb  -Will need ambulatory sat  -Pulm consulted, f/u recs

## 2023-01-06 NOTE — H&P ADULT - PROBLEM SELECTOR PLAN 7
Home meds: Glyburide and Metformin, Reported A1c ~8.5   -Hyperglycemic in ER, likely iso sepsis and steroid use.   -Start Lantus and Admelog   -Son reports gastroparesis, c/w Reglan Home meds: Glyburide and Metformin, Reported A1c ~8.5   -Hyperglycemic in ER, likely iso sepsis and steroid use.   -STAT Lantus 10U -->  ordered for 15U in AM, adjust as needed   -Start  Admelog 5U TID before meals  -Son reports gastroparesis, c/w Reglan

## 2023-01-06 NOTE — H&P ADULT - NSICDXPASTMEDICALHX_GEN_ALL_CORE_FT
PAST MEDICAL HISTORY:  Asthma     CAD (coronary artery disease)     Essential hypertension     Type 2 diabetes mellitus

## 2023-01-06 NOTE — H&P ADULT - NSHPLABSRESULTS_GEN_ALL_CORE
.  LABS:                         12.8   13.28 )-----------( 297      ( 05 Jan 2023 20:12 )             37.9     01-05    138  |  104  |  16  ----------------------------<  343<H>  3.8   |  18<L>  |  1.07    Ca    9.0      05 Jan 2023 20:12  Mg     2.70     01-05    TPro  8.6<H>  /  Alb  4.0  /  TBili  0.2  /  DBili  x   /  AST  17  /  ALT  22  /  AlkPhos  127<H>  01-05    PT/INR - ( 06 Jan 2023 04:11 )   PT: 12.3 sec;   INR: 1.06 ratio         PTT - ( 06 Jan 2023 04:11 )  PTT:26.3 sec              RADIOLOGY, EKG & ADDITIONAL TESTS: Reviewed by me  EKG: rapid afib, 110 nonspecific twave changes  CXR: no focal consolidation, patchiness b/l lower lobes

## 2023-01-06 NOTE — ED ADULT NURSE REASSESSMENT NOTE - NS ED NURSE REASSESS COMMENT FT1
Pt is resting on room air SpO2 at 98,  AxO4 Second dose of albuterol nebulizer was administered as per ordered. Pt on tele A-fib. Pt is tachypneic at times, but he states it has improved.  RR remain at 20 breaths per minute. Will continue to monitor the patient.

## 2023-01-06 NOTE — H&P ADULT - ASSESSMENT
.  LABS:                         12.8   13.28 )-----------( 297      ( 05 Jan 2023 20:12 )             37.9     01-05    138  |  104  |  16  ----------------------------<  343<H>  3.8   |  18<L>  |  1.07    Ca    9.0      05 Jan 2023 20:12  Mg     2.70     01-05    TPro  8.6<H>  /  Alb  4.0  /  TBili  0.2  /  DBili  x   /  AST  17  /  ALT  22  /  AlkPhos  127<H>  01-05    PT/INR - ( 06 Jan 2023 04:11 )   PT: 12.3 sec;   INR: 1.06 ratio         PTT - ( 06 Jan 2023 04:11 )  PTT:26.3 sec              RADIOLOGY, EKG & ADDITIONAL TESTS: Reviewed by me  EKG:   CXR: no focal consolidation, patchiness b/l lower lobes Yes 67M w/asthma, diabetes, HTN, CAD s/p 1 stent, presents with 2 to 3 days of shortness of breath, wheezing, and cough, admitted with severe sepsis 2/2 influenza A, hypoxic respiratory failure, and rapid atrial fibrillation

## 2023-01-06 NOTE — H&P ADULT - PROBLEM SELECTOR PLAN 1
2/2 influenza, tachycardiac, tachypneic and hypoxic.   -s/p 1L NS in ER  -Lactate elevated 5.9--> 4.6, repeat pending  -C/w Tamiflu  -C/w oxygen as needed

## 2023-01-06 NOTE — ED ADULT NURSE REASSESSMENT NOTE - NS ED NURSE REASSESS COMMENT FT1
Break RN- Patient received in stretcher. Awake. Respirations even and unlabored. Spontaneous movement of all extremities noted. Respirations even and unlabored. Oxygen saturation WNL. Able to speak full complete sentences without difficulty. Airway open patent and unobstructed isolation precautions remain in place. Comfort and safety maintained. All current care needs met. Care plan continued Jenae GUZMAN

## 2023-01-06 NOTE — H&P ADULT - NSHPREVIEWOFSYSTEMS_GEN_ALL_CORE
REVIEW OF SYSTEMS:    CONSTITUTIONAL: No weakness, fevers or chills  EYES/ENT: No visual changes;  No vertigo or throat pain   NECK: No pain or stiffness  RESPIRATORY: +cough, wheezing, and shortness of breath, no hemoptysis  CARDIOVASCULAR: No chest pain or palpitations  GASTROINTESTINAL: No abdominal or epigastric pain. No nausea, vomiting, or hematemesis; No diarrhea or constipation. No melena or hematochezia.  GENITOURINARY: No dysuria, frequency or hematuria  NEUROLOGICAL: No numbness or weakness  SKIN: No itching, rashes

## 2023-01-06 NOTE — CONSULT NOTE ADULT - SUBJECTIVE AND OBJECTIVE BOX
SUBJECTIVE  CONSULT QUESTION: Asthma Exacerbation ico Influenza A+  SUMMARY OF HOSPITALIZATION / HPI  Mr. Carreon is a 65F with h/o       PAST MEDICAL/SURGICAL HISTORY  PAST MEDICAL & SURGICAL HISTORY:  Type 2 diabetes mellitus  CAD (coronary artery disease)  Asthma  Essential hypertension    FAMILY HISTORY:      SOCIAL HISTORY:  Smoking:   EtOH Use:  Marital Status:  Occupation:  Recent Travel:  Advance Directives:    ALLERGIES  NKDA    HOME MEDICATIONS:      HOSPITAL MEDICATIONS:  MEDICATIONS  (STANDING):  albuterol/ipratropium for Nebulization 3 milliLiter(s) Nebulizer every 6 hours  amLODIPine   Tablet 10 milliGRAM(s) Oral daily  budesonide 160 MICROgram(s)/formoterol 4.5 MICROgram(s) Inhaler 2 Puff(s) Inhalation two times a day  cloNIDine 0.1 milliGRAM(s) Oral two times a day  dextrose 5%. 1000 milliLiter(s) (100 mL/Hr) IV Continuous <Continuous>  dextrose 5%. 1000 milliLiter(s) (50 mL/Hr) IV Continuous <Continuous>  dextrose 50% Injectable 25 Gram(s) IV Push once  dextrose 50% Injectable 12.5 Gram(s) IV Push once  dextrose 50% Injectable 25 Gram(s) IV Push once  enoxaparin Injectable 40 milliGRAM(s) SubCutaneous every 24 hours  glucagon  Injectable 1 milliGRAM(s) IntraMuscular once  insulin glargine Injectable (LANTUS) 10 Unit(s) SubCutaneous once  insulin lispro (ADMELOG) corrective regimen sliding scale   SubCutaneous Before meals and at bedtime  insulin lispro Injectable (ADMELOG) 5 Unit(s) SubCutaneous three times a day before meals  losartan 100 milliGRAM(s) Oral daily  methylPREDNISolone sodium succinate Injectable 40 milliGRAM(s) IV Push two times a day  metoclopramide 10 milliGRAM(s) Oral at bedtime  montelukast 10 milliGRAM(s) Oral daily  oseltamivir 75 milliGRAM(s) Oral two times a day    MEDICATIONS  (PRN):  dextrose Oral Gel 15 Gram(s) Oral once PRN Blood Glucose LESS THAN 70 milliGRAM(s)/deciliter      REVIEW OF SYSTEMS:  [ ] All other systems negative  [ ] Unable to assess ROS because ________    OBJECTIVE:  VITAL SIGNS  ICU Vital Signs Last 24 Hrs  T(C): 36.1 (06 Jan 2023 12:14), Max: 37.1 (06 Jan 2023 08:57)  T(F): 97 (06 Jan 2023 12:14), Max: 98.7 (06 Jan 2023 08:57)  HR: 69 (06 Jan 2023 12:14) (69 - 135)  BP: 137/69 (06 Jan 2023 12:14) (137/69 - 184/89)  BP(mean): --  ABP: --  ABP(mean): --  RR: 20 (06 Jan 2023 12:14) (14 - 20)  SpO2: 97% (06 Jan 2023 12:14) (96% - 100%)    O2 Parameters below as of 06 Jan 2023 12:14  Patient On (Oxygen Delivery Method): room air    PHYSICAL EXAM:  GEN: Awake, AOx3, NAD.  HEENT: NCAT  ---EYES: no scleral icterus, EOMI, PERRLA  CARDIO: RRR. Normal S1/S2, no m/r/g. No JVD.  RESP: CTAB  ABD: Soft, NTND. BS+. No masses, no hepatosplenomegaly.  : No CVAT.   MSK: No obvious deformity or ROM deficit. 2+ pulses x4. No edema.  SKIN: Warm, dry. No rashes. Nail beds without cyanosis or clubbing.  NEURO: Moves all four extremities spontaneously  PSYCH: Appropriate mood & affect.           LAB DATA                        12.8   13.28 )-----------( 297      ( 05 Jan 2023 20:12 )             37.9     01-05    138  |  104  |  16  ----------------------------<  343<H>  3.8   |  18<L>  |  1.07    Ca    9.0      05 Jan 2023 20:12  Mg     2.70     01-05    TPro  8.6<H>  /  Alb  4.0  /  TBili  0.2  /  DBili  x   /  AST  17  /  ALT  22  /  AlkPhos  127<H>  01-05    PT/INR - ( 06 Jan 2023 04:11 )   PT: 12.3 sec;   INR: 1.06 ratio         PTT - ( 06 Jan 2023 04:11 )  PTT:26.3 sec    pH, Venous: 7.28 (01-06-23 @ 00:30)  HCO3, Venous: 17 mmol/L (01-06-23 @ 00:30)  pCO2, Venous: 36 mmHg (01-06-23 @ 00:30)  pO2, Venous: 53 mmHg (01-06-23 @ 00:30)    Blood Gas Venous - Lactate: 4.6 mmol/L (01-06-23 @ 00:30)  Blood Gas Venous - Lactate: 5.9 mmol/L (01-05-23 @ 22:40)      CAPILLARY BLOOD GLUCOSE      POCT Blood Glucose.: 304 mg/dL (06 Jan 2023 09:25)      ADDITIONAL TESTS & IMAGING  RADIOLOGY:  < from: Xray Chest 1 View- PORTABLE-Urgent (Xray Chest 1 View- PORTABLE-Urgent .) (01.05.23 @ 23:04) >  FINDINGS:    The lungs are clear. No pleural effusion or pneumothorax.  Cardiomediastinal silhouette is poorly evaluated on this projection.  Osseous degenerative changes.    IMPRESSION:    Clear lungs.    < end of copied text >    MICROBIOLOGY:         CARDIOLOGY DATA:     SUBJECTIVE  CONSULT QUESTION: Asthma Exacerbation ico Influenza A+  SUMMARY OF HOSPITALIZATION / HPI  [facility]  #[id number] (Buzz)  Mr. Carreon is a 65F with h/o DM, HTN, CAD (s/p stent), Asthma who presents with 5 days of shortness of breath and 3 days of chest pain and found to have influenza A. He says that he's never been     In the ED, he was noted to have diffuse wheeze and started on standing DuoNebs.       PAST MEDICAL/SURGICAL HISTORY  PAST MEDICAL & SURGICAL HISTORY:  Type 2 diabetes mellitus  CAD (coronary artery disease)  Asthma  Essential hypertension    FAMILY HISTORY:      SOCIAL HISTORY:  Smoking:   EtOH Use:  Marital Status:  Occupation:  Recent Travel:  Advance Directives:    ALLERGIES  NKDA    HOME MEDICATIONS:      HOSPITAL MEDICATIONS:  MEDICATIONS  (STANDING):  albuterol/ipratropium for Nebulization 3 milliLiter(s) Nebulizer every 6 hours  amLODIPine   Tablet 10 milliGRAM(s) Oral daily  budesonide 160 MICROgram(s)/formoterol 4.5 MICROgram(s) Inhaler 2 Puff(s) Inhalation two times a day  cloNIDine 0.1 milliGRAM(s) Oral two times a day  dextrose 5%. 1000 milliLiter(s) (100 mL/Hr) IV Continuous <Continuous>  dextrose 5%. 1000 milliLiter(s) (50 mL/Hr) IV Continuous <Continuous>  dextrose 50% Injectable 25 Gram(s) IV Push once  dextrose 50% Injectable 12.5 Gram(s) IV Push once  dextrose 50% Injectable 25 Gram(s) IV Push once  enoxaparin Injectable 40 milliGRAM(s) SubCutaneous every 24 hours  glucagon  Injectable 1 milliGRAM(s) IntraMuscular once  insulin glargine Injectable (LANTUS) 10 Unit(s) SubCutaneous once  insulin lispro (ADMELOG) corrective regimen sliding scale   SubCutaneous Before meals and at bedtime  insulin lispro Injectable (ADMELOG) 5 Unit(s) SubCutaneous three times a day before meals  losartan 100 milliGRAM(s) Oral daily  methylPREDNISolone sodium succinate Injectable 40 milliGRAM(s) IV Push two times a day  metoclopramide 10 milliGRAM(s) Oral at bedtime  montelukast 10 milliGRAM(s) Oral daily  oseltamivir 75 milliGRAM(s) Oral two times a day    MEDICATIONS  (PRN):  dextrose Oral Gel 15 Gram(s) Oral once PRN Blood Glucose LESS THAN 70 milliGRAM(s)/deciliter      REVIEW OF SYSTEMS:  [ ] All other systems negative  [ ] Unable to assess ROS because ________    OBJECTIVE:  VITAL SIGNS  ICU Vital Signs Last 24 Hrs  T(C): 36.1 (06 Jan 2023 12:14), Max: 37.1 (06 Jan 2023 08:57)  T(F): 97 (06 Jan 2023 12:14), Max: 98.7 (06 Jan 2023 08:57)  HR: 69 (06 Jan 2023 12:14) (69 - 135)  BP: 137/69 (06 Jan 2023 12:14) (137/69 - 184/89)  BP(mean): --  ABP: --  ABP(mean): --  RR: 20 (06 Jan 2023 12:14) (14 - 20)  SpO2: 97% (06 Jan 2023 12:14) (96% - 100%)    O2 Parameters below as of 06 Jan 2023 12:14  Patient On (Oxygen Delivery Method): room air    PHYSICAL EXAM:  GEN: Awake, AOx3, NAD.  HEENT: NCAT  ---EYES: no scleral icterus, EOMI, PERRLA  CARDIO: RRR. Normal S1/S2, no m/r/g. No JVD.  RESP: CTAB  ABD: Soft, NTND. BS+. No masses, no hepatosplenomegaly.  : No CVAT.   MSK: No obvious deformity or ROM deficit. 2+ pulses x4. No edema.  SKIN: Warm, dry. No rashes. Nail beds without cyanosis or clubbing.  NEURO: Moves all four extremities spontaneously  PSYCH: Appropriate mood & affect.           LAB DATA                        12.8   13.28 )-----------( 297      ( 05 Jan 2023 20:12 )             37.9     01-05    138  |  104  |  16  ----------------------------<  343<H>  3.8   |  18<L>  |  1.07    Ca    9.0      05 Jan 2023 20:12  Mg     2.70     01-05    TPro  8.6<H>  /  Alb  4.0  /  TBili  0.2  /  DBili  x   /  AST  17  /  ALT  22  /  AlkPhos  127<H>  01-05    PT/INR - ( 06 Jan 2023 04:11 )   PT: 12.3 sec;   INR: 1.06 ratio         PTT - ( 06 Jan 2023 04:11 )  PTT:26.3 sec    pH, Venous: 7.28 (01-06-23 @ 00:30)  HCO3, Venous: 17 mmol/L (01-06-23 @ 00:30)  pCO2, Venous: 36 mmHg (01-06-23 @ 00:30)  pO2, Venous: 53 mmHg (01-06-23 @ 00:30)    Blood Gas Venous - Lactate: 4.6 mmol/L (01-06-23 @ 00:30)  Blood Gas Venous - Lactate: 5.9 mmol/L (01-05-23 @ 22:40)      CAPILLARY BLOOD GLUCOSE      POCT Blood Glucose.: 304 mg/dL (06 Jan 2023 09:25)      ADDITIONAL TESTS & IMAGING  RADIOLOGY:  < from: Xray Chest 1 View- PORTABLE-Urgent (Xray Chest 1 View- PORTABLE-Urgent .) (01.05.23 @ 23:04) >  FINDINGS:    The lungs are clear. No pleural effusion or pneumothorax.  Cardiomediastinal silhouette is poorly evaluated on this projection.  Osseous degenerative changes.    IMPRESSION:    Clear lungs.    < end of copied text >    MICROBIOLOGY:         CARDIOLOGY DATA:     SUBJECTIVE  CONSULT QUESTION: Asthma Exacerbation ico Influenza A+  SUMMARY OF HOSPITALIZATION / HPI  [facility]  #[id number] (Buzz)  Mr. Carreon is a 65F with h/o DM, HTN, CAD (s/p stent), Asthma who presents with 5 days of shortness of breath and 3 days of chest pain and found to have influenza A. He says that he's never been hospitalized or intubated for asthma. He says that his CP does not feel like his past anginal episodes (prior to his "heart procedure").     In the ED, he was noted to have diffuse wheeze and started on standing DuoNebs. He was also noted to be in AFib with rates ~110s. He has no diagnosis of AFib in the past.       PAST MEDICAL/SURGICAL HISTORY  PAST MEDICAL & SURGICAL HISTORY:  Type 2 diabetes mellitus  CAD (coronary artery disease)  Asthma  Essential hypertension    ALLERGIES  NKDA    HOME MEDICATIONS:      HOSPITAL MEDICATIONS:  MEDICATIONS  (STANDING):  albuterol/ipratropium for Nebulization 3 milliLiter(s) Nebulizer every 6 hours  amLODIPine   Tablet 10 milliGRAM(s) Oral daily  budesonide 160 MICROgram(s)/formoterol 4.5 MICROgram(s) Inhaler 2 Puff(s) Inhalation two times a day  cloNIDine 0.1 milliGRAM(s) Oral two times a day  dextrose 5%. 1000 milliLiter(s) (100 mL/Hr) IV Continuous <Continuous>  dextrose 5%. 1000 milliLiter(s) (50 mL/Hr) IV Continuous <Continuous>  dextrose 50% Injectable 25 Gram(s) IV Push once  dextrose 50% Injectable 12.5 Gram(s) IV Push once  dextrose 50% Injectable 25 Gram(s) IV Push once  enoxaparin Injectable 40 milliGRAM(s) SubCutaneous every 24 hours  glucagon  Injectable 1 milliGRAM(s) IntraMuscular once  insulin glargine Injectable (LANTUS) 10 Unit(s) SubCutaneous once  insulin lispro (ADMELOG) corrective regimen sliding scale   SubCutaneous Before meals and at bedtime  insulin lispro Injectable (ADMELOG) 5 Unit(s) SubCutaneous three times a day before meals  losartan 100 milliGRAM(s) Oral daily  methylPREDNISolone sodium succinate Injectable 40 milliGRAM(s) IV Push two times a day  metoclopramide 10 milliGRAM(s) Oral at bedtime  montelukast 10 milliGRAM(s) Oral daily  oseltamivir 75 milliGRAM(s) Oral two times a day    MEDICATIONS  (PRN):  dextrose Oral Gel 15 Gram(s) Oral once PRN Blood Glucose LESS THAN 70 milliGRAM(s)/deciliter      REVIEW OF SYSTEMS:  [x] All other systems negative  [ ] Unable to assess ROS because ________    OBJECTIVE:  VITAL SIGNS  ICU Vital Signs Last 24 Hrs  T(C): 36.1 (06 Jan 2023 12:14), Max: 37.1 (06 Jan 2023 08:57)  T(F): 97 (06 Jan 2023 12:14), Max: 98.7 (06 Jan 2023 08:57)  HR: 69 (06 Jan 2023 12:14) (69 - 135)  BP: 137/69 (06 Jan 2023 12:14) (137/69 - 184/89)  BP(mean): --  ABP: --  ABP(mean): --  RR: 20 (06 Jan 2023 12:14) (14 - 20)  SpO2: 97% (06 Jan 2023 12:14) (96% - 100%)    O2 Parameters below as of 06 Jan 2023 12:14  Patient On (Oxygen Delivery Method): room air    PHYSICAL EXAM:  GEN: Awake, AOx3, NAD.  HEENT: NCAT  ---EYES: no scleral icterus  CARDIO: tachycardic, regular rhythm at this time  RESP: Diffuse wheeze with >3s expiratory phase (STAT DuoNeb ordered); decreased aeration R > L; speaking in 3-4 word phrases  ABD: Soft, NTND.   MSK: No obvious deformity or ROM deficit. 2+ pulses x4. No edema.  SKIN: Warm, dry. No rashes.  NEURO: Moves all four extremities spontaneously  PSYCH: Appropriate mood & affect.     LAB DATA                        12.8   13.28 )-----------( 297      ( 05 Jan 2023 20:12 )             37.9     01-05    138  |  104  |  16  ----------------------------<  343<H>  3.8   |  18<L>  |  1.07    Ca    9.0      05 Jan 2023 20:12  Mg     2.70     01-05    TPro  8.6<H>  /  Alb  4.0  /  TBili  0.2  /  DBili  x   /  AST  17  /  ALT  22  /  AlkPhos  127<H>  01-05    PT/INR - ( 06 Jan 2023 04:11 )   PT: 12.3 sec;   INR: 1.06 ratio         PTT - ( 06 Jan 2023 04:11 )  PTT:26.3 sec    pH, Venous: 7.28 (01-06-23 @ 00:30)  HCO3, Venous: 17 mmol/L (01-06-23 @ 00:30)  pCO2, Venous: 36 mmHg (01-06-23 @ 00:30)  pO2, Venous: 53 mmHg (01-06-23 @ 00:30)    Blood Gas Venous - Lactate: 4.6 mmol/L (01-06-23 @ 00:30)  Blood Gas Venous - Lactate: 5.9 mmol/L (01-05-23 @ 22:40)      CAPILLARY BLOOD GLUCOSE      POCT Blood Glucose.: 304 mg/dL (06 Jan 2023 09:25)      ADDITIONAL TESTS & IMAGING  RADIOLOGY:  < from: Xray Chest 1 View- PORTABLE-Urgent (Xray Chest 1 View- PORTABLE-Urgent .) (01.05.23 @ 23:04) >  FINDINGS:    The lungs are clear. No pleural effusion or pneumothorax.  Cardiomediastinal silhouette is poorly evaluated on this projection.  Osseous degenerative changes.    IMPRESSION:    Clear lungs.    < end of copied text >    MICROBIOLOGY:   Influenza A+      CARDIOLOGY DATA:  AFib with rate 110, full upload to follow, most likely

## 2023-01-06 NOTE — CONSULT NOTE ADULT - ASSESSMENT
*** INCOMPLETE ***  SYNTHESIS  Mr. Carreon is a 65F with h/o       SUMMARY OF RECOMMENDATIONS SYNTHESIS  #Asthma Exacerbation 2/2 Influenza A  Mr. Carreon is a 65F with h/o Asthma, HTN, CAD (s/p 1 stent, dates unknown), DM (on Januvia, Glipizide) who presents with shortness of breath, wheezing 2/2 likely asthma exacerbation ico Influenza A+. On initial evaluation, his O2 sat was 93-94% at rest, down to 88-90% upon sitting upw ith diffuse wheeze, which seems to have responded well to Duonebs on reassessment a few hours later. He does have lactic acidemia, which is likely at least in part driven by his Duoneb therapy, and thankfully, his gas may be improving. He should continue on Duoneb/Steroid treatment for now given his trigger is likely Influenza.     SUMMARY OF RECOMMENDATIONS  - Recommend INCREASE Duonebs to Q4H SCHEDULED  - Recommend CONTINUE Methylprednisolone 40mg IV BID (will titrate to clinical response)  - If pt condition worsens, would consider Magnesium 2g IV for acute asthma exacerbation  - If pt condition worsens, could also consider NIPPV if increased WOB.  - Continue with Influenza management as you are doing    Calderon Quijano MD PGY-2  Case D/W Dr. Schmidt

## 2023-01-06 NOTE — H&P ADULT - PROBLEM SELECTOR PLAN 2
2/2 sepsis and hypoxia  - Now resolved, currently in sinus on telemetry  - discontinued heparin gtt   - seen by cardiology, can hold off on AC unless there is long term recurrence

## 2023-01-07 LAB
A1C WITH ESTIMATED AVERAGE GLUCOSE RESULT: 8.4 % — HIGH (ref 4–5.6)
ANION GAP SERPL CALC-SCNC: 13 MMOL/L — SIGNIFICANT CHANGE UP (ref 7–14)
BASOPHILS # BLD AUTO: 0.01 K/UL — SIGNIFICANT CHANGE UP (ref 0–0.2)
BASOPHILS NFR BLD AUTO: 0.1 % — SIGNIFICANT CHANGE UP (ref 0–2)
BUN SERPL-MCNC: 23 MG/DL — SIGNIFICANT CHANGE UP (ref 7–23)
CALCIUM SERPL-MCNC: 8.8 MG/DL — SIGNIFICANT CHANGE UP (ref 8.4–10.5)
CHLORIDE SERPL-SCNC: 105 MMOL/L — SIGNIFICANT CHANGE UP (ref 98–107)
CO2 SERPL-SCNC: 21 MMOL/L — LOW (ref 22–31)
CREAT SERPL-MCNC: 0.94 MG/DL — SIGNIFICANT CHANGE UP (ref 0.5–1.3)
EGFR: 88 ML/MIN/1.73M2 — SIGNIFICANT CHANGE UP
EOSINOPHIL # BLD AUTO: 0 K/UL — SIGNIFICANT CHANGE UP (ref 0–0.5)
EOSINOPHIL NFR BLD AUTO: 0 % — SIGNIFICANT CHANGE UP (ref 0–6)
ESTIMATED AVERAGE GLUCOSE: 194 — SIGNIFICANT CHANGE UP
GLUCOSE BLDC GLUCOMTR-MCNC: 208 MG/DL — HIGH (ref 70–99)
GLUCOSE BLDC GLUCOMTR-MCNC: 212 MG/DL — HIGH (ref 70–99)
GLUCOSE BLDC GLUCOMTR-MCNC: 287 MG/DL — HIGH (ref 70–99)
GLUCOSE BLDC GLUCOMTR-MCNC: 307 MG/DL — HIGH (ref 70–99)
GLUCOSE BLDC GLUCOMTR-MCNC: 445 MG/DL — HIGH (ref 70–99)
GLUCOSE BLDC GLUCOMTR-MCNC: 446 MG/DL — HIGH (ref 70–99)
GLUCOSE SERPL-MCNC: 214 MG/DL — HIGH (ref 70–99)
HCT VFR BLD CALC: 31.9 % — LOW (ref 39–50)
HCV AB S/CO SERPL IA: 0.09 S/CO — SIGNIFICANT CHANGE UP (ref 0–0.99)
HCV AB SERPL-IMP: SIGNIFICANT CHANGE UP
HGB BLD-MCNC: 10.9 G/DL — LOW (ref 13–17)
IANC: 13.93 K/UL — HIGH (ref 1.8–7.4)
IMM GRANULOCYTES NFR BLD AUTO: 1.6 % — HIGH (ref 0–0.9)
LACTATE SERPL-SCNC: 2.3 MMOL/L — HIGH (ref 0.5–2)
LYMPHOCYTES # BLD AUTO: 1.47 K/UL — SIGNIFICANT CHANGE UP (ref 1–3.3)
LYMPHOCYTES # BLD AUTO: 8.8 % — LOW (ref 13–44)
MAGNESIUM SERPL-MCNC: 2.1 MG/DL — SIGNIFICANT CHANGE UP (ref 1.6–2.6)
MCHC RBC-ENTMCNC: 28.5 PG — SIGNIFICANT CHANGE UP (ref 27–34)
MCHC RBC-ENTMCNC: 34.2 GM/DL — SIGNIFICANT CHANGE UP (ref 32–36)
MCV RBC AUTO: 83.3 FL — SIGNIFICANT CHANGE UP (ref 80–100)
MONOCYTES # BLD AUTO: 0.99 K/UL — HIGH (ref 0–0.9)
MONOCYTES NFR BLD AUTO: 5.9 % — SIGNIFICANT CHANGE UP (ref 2–14)
NEUTROPHILS # BLD AUTO: 13.93 K/UL — HIGH (ref 1.8–7.4)
NEUTROPHILS NFR BLD AUTO: 83.6 % — HIGH (ref 43–77)
NRBC # BLD: 0 /100 WBCS — SIGNIFICANT CHANGE UP (ref 0–0)
NRBC # FLD: 0 K/UL — SIGNIFICANT CHANGE UP (ref 0–0)
PHOSPHATE SERPL-MCNC: 3.5 MG/DL — SIGNIFICANT CHANGE UP (ref 2.5–4.5)
PLATELET # BLD AUTO: 381 K/UL — SIGNIFICANT CHANGE UP (ref 150–400)
POTASSIUM SERPL-MCNC: 3.5 MMOL/L — SIGNIFICANT CHANGE UP (ref 3.5–5.3)
POTASSIUM SERPL-SCNC: 3.5 MMOL/L — SIGNIFICANT CHANGE UP (ref 3.5–5.3)
RBC # BLD: 3.83 M/UL — LOW (ref 4.2–5.8)
RBC # FLD: 15.4 % — HIGH (ref 10.3–14.5)
SODIUM SERPL-SCNC: 139 MMOL/L — SIGNIFICANT CHANGE UP (ref 135–145)
WBC # BLD: 16.66 K/UL — HIGH (ref 3.8–10.5)
WBC # FLD AUTO: 16.66 K/UL — HIGH (ref 3.8–10.5)

## 2023-01-07 PROCEDURE — 93010 ELECTROCARDIOGRAM REPORT: CPT

## 2023-01-07 PROCEDURE — 99232 SBSQ HOSP IP/OBS MODERATE 35: CPT

## 2023-01-07 RX ORDER — ASPIRIN/CALCIUM CARB/MAGNESIUM 324 MG
81 TABLET ORAL DAILY
Refills: 0 | Status: DISCONTINUED | OUTPATIENT
Start: 2023-01-07 | End: 2023-01-13

## 2023-01-07 RX ORDER — HEPARIN SODIUM 5000 [USP'U]/ML
2500 INJECTION INTRAVENOUS; SUBCUTANEOUS EVERY 6 HOURS
Refills: 0 | Status: DISCONTINUED | OUTPATIENT
Start: 2023-01-07 | End: 2023-01-10

## 2023-01-07 RX ORDER — LEVALBUTEROL 1.25 MG/.5ML
0.63 SOLUTION, CONCENTRATE RESPIRATORY (INHALATION) EVERY 6 HOURS
Refills: 0 | Status: DISCONTINUED | OUTPATIENT
Start: 2023-01-08 | End: 2023-01-13

## 2023-01-07 RX ORDER — DILTIAZEM HCL 120 MG
10 CAPSULE, EXT RELEASE 24 HR ORAL ONCE
Refills: 0 | Status: COMPLETED | OUTPATIENT
Start: 2023-01-07 | End: 2023-01-07

## 2023-01-07 RX ORDER — ACETAMINOPHEN 500 MG
650 TABLET ORAL ONCE
Refills: 0 | Status: COMPLETED | OUTPATIENT
Start: 2023-01-07 | End: 2023-01-07

## 2023-01-07 RX ORDER — HEPARIN SODIUM 5000 [USP'U]/ML
INJECTION INTRAVENOUS; SUBCUTANEOUS
Qty: 25000 | Refills: 0 | Status: DISCONTINUED | OUTPATIENT
Start: 2023-01-07 | End: 2023-01-10

## 2023-01-07 RX ORDER — INSULIN GLARGINE 100 [IU]/ML
20 INJECTION, SOLUTION SUBCUTANEOUS EVERY MORNING
Refills: 0 | Status: DISCONTINUED | OUTPATIENT
Start: 2023-01-07 | End: 2023-01-08

## 2023-01-07 RX ORDER — DILTIAZEM HCL 120 MG
15 CAPSULE, EXT RELEASE 24 HR ORAL ONCE
Refills: 0 | Status: COMPLETED | OUTPATIENT
Start: 2023-01-07 | End: 2023-01-07

## 2023-01-07 RX ORDER — INSULIN LISPRO 100/ML
8 VIAL (ML) SUBCUTANEOUS
Refills: 0 | Status: DISCONTINUED | OUTPATIENT
Start: 2023-01-07 | End: 2023-01-08

## 2023-01-07 RX ORDER — ATORVASTATIN CALCIUM 80 MG/1
80 TABLET, FILM COATED ORAL AT BEDTIME
Refills: 0 | Status: DISCONTINUED | OUTPATIENT
Start: 2023-01-07 | End: 2023-01-13

## 2023-01-07 RX ORDER — HEPARIN SODIUM 5000 [USP'U]/ML
5500 INJECTION INTRAVENOUS; SUBCUTANEOUS EVERY 6 HOURS
Refills: 0 | Status: DISCONTINUED | OUTPATIENT
Start: 2023-01-07 | End: 2023-01-10

## 2023-01-07 RX ORDER — BNT162B2 ORIGINAL AND OMICRON BA.4/BA.5 .1125; .1125 MG/2.25ML; MG/2.25ML
0.3 INJECTION, SUSPENSION INTRAMUSCULAR ONCE
Refills: 0 | Status: DISCONTINUED | OUTPATIENT
Start: 2023-01-07 | End: 2023-01-13

## 2023-01-07 RX ADMIN — HEPARIN SODIUM 1200 UNIT(S)/HR: 5000 INJECTION INTRAVENOUS; SUBCUTANEOUS at 23:08

## 2023-01-07 RX ADMIN — Medication 3 MILLILITER(S): at 19:54

## 2023-01-07 RX ADMIN — Medication 650 MILLIGRAM(S): at 22:54

## 2023-01-07 RX ADMIN — BUDESONIDE AND FORMOTEROL FUMARATE DIHYDRATE 2 PUFF(S): 160; 4.5 AEROSOL RESPIRATORY (INHALATION) at 20:57

## 2023-01-07 RX ADMIN — BUDESONIDE AND FORMOTEROL FUMARATE DIHYDRATE 2 PUFF(S): 160; 4.5 AEROSOL RESPIRATORY (INHALATION) at 09:00

## 2023-01-07 RX ADMIN — Medication 12: at 12:46

## 2023-01-07 RX ADMIN — LOSARTAN POTASSIUM 100 MILLIGRAM(S): 100 TABLET, FILM COATED ORAL at 06:53

## 2023-01-07 RX ADMIN — Medication 0.1 MILLIGRAM(S): at 06:53

## 2023-01-07 RX ADMIN — Medication 8 UNIT(S): at 17:34

## 2023-01-07 RX ADMIN — Medication 5 UNIT(S): at 12:46

## 2023-01-07 RX ADMIN — AMLODIPINE BESYLATE 10 MILLIGRAM(S): 2.5 TABLET ORAL at 07:02

## 2023-01-07 RX ADMIN — ENOXAPARIN SODIUM 40 MILLIGRAM(S): 100 INJECTION SUBCUTANEOUS at 12:50

## 2023-01-07 RX ADMIN — Medication 75 MILLIGRAM(S): at 07:34

## 2023-01-07 RX ADMIN — INSULIN GLARGINE 20 UNIT(S): 100 INJECTION, SOLUTION SUBCUTANEOUS at 22:55

## 2023-01-07 RX ADMIN — Medication 3 MILLILITER(S): at 07:07

## 2023-01-07 RX ADMIN — Medication 40 MILLIGRAM(S): at 06:53

## 2023-01-07 RX ADMIN — ATORVASTATIN CALCIUM 80 MILLIGRAM(S): 80 TABLET, FILM COATED ORAL at 22:54

## 2023-01-07 RX ADMIN — Medication 4: at 08:57

## 2023-01-07 RX ADMIN — Medication 5 UNIT(S): at 08:57

## 2023-01-07 RX ADMIN — Medication 10 MILLIGRAM(S): at 22:55

## 2023-01-07 RX ADMIN — Medication 40 MILLIGRAM(S): at 17:36

## 2023-01-07 RX ADMIN — Medication 6: at 17:35

## 2023-01-07 RX ADMIN — Medication 10 MILLIGRAM(S): at 20:56

## 2023-01-07 RX ADMIN — Medication 0.1 MILLIGRAM(S): at 17:35

## 2023-01-07 RX ADMIN — Medication 3 MILLILITER(S): at 03:08

## 2023-01-07 RX ADMIN — INSULIN GLARGINE 15 UNIT(S): 100 INJECTION, SOLUTION SUBCUTANEOUS at 08:58

## 2023-01-07 RX ADMIN — Medication 3 MILLILITER(S): at 15:20

## 2023-01-07 RX ADMIN — Medication 75 MILLIGRAM(S): at 17:35

## 2023-01-07 RX ADMIN — Medication 4: at 23:06

## 2023-01-07 RX ADMIN — MONTELUKAST 10 MILLIGRAM(S): 4 TABLET, CHEWABLE ORAL at 12:50

## 2023-01-07 RX ADMIN — Medication 3 MILLILITER(S): at 11:55

## 2023-01-07 NOTE — PROGRESS NOTE ADULT - SUBJECTIVE AND OBJECTIVE BOX
Merlin Mathew, MD   Hospitalist  Pager #[phone]    PROGRESS NOTE:     Patient is a 67y old  Male who presents with a chief complaint of sob (07 Jan 2023 09:09)      SUBJECTIVE / OVERNIGHT EVENTS: NEON   Patient SOB, wheezing, nebulizer tx given   Son at bedside- noting pt is HTN    ADDITIONAL REVIEW OF SYSTEMS:    MEDICATIONS  (STANDING):  albuterol/ipratropium for Nebulization 3 milliLiter(s) Nebulizer every 4 hours  amLODIPine   Tablet 10 milliGRAM(s) Oral daily  budesonide 160 MICROgram(s)/formoterol 4.5 MICROgram(s) Inhaler 2 Puff(s) Inhalation two times a day  cloNIDine 0.1 milliGRAM(s) Oral two times a day  coronavirus bivalent (EUA) Booster Vaccine (PFIZER) 0.3 milliLiter(s) IntraMuscular once  dextrose 5%. 1000 milliLiter(s) (50 mL/Hr) IV Continuous <Continuous>  dextrose 5%. 1000 milliLiter(s) (100 mL/Hr) IV Continuous <Continuous>  dextrose 50% Injectable 25 Gram(s) IV Push once  dextrose 50% Injectable 25 Gram(s) IV Push once  dextrose 50% Injectable 12.5 Gram(s) IV Push once  enoxaparin Injectable 40 milliGRAM(s) SubCutaneous every 24 hours  glucagon  Injectable 1 milliGRAM(s) IntraMuscular once  insulin glargine Injectable (LANTUS) 15 Unit(s) SubCutaneous every morning  insulin lispro (ADMELOG) corrective regimen sliding scale   SubCutaneous Before meals and at bedtime  insulin lispro Injectable (ADMELOG) 5 Unit(s) SubCutaneous three times a day before meals  losartan 100 milliGRAM(s) Oral daily  methylPREDNISolone sodium succinate Injectable 40 milliGRAM(s) IV Push two times a day  metoclopramide 10 milliGRAM(s) Oral at bedtime  montelukast 10 milliGRAM(s) Oral daily  oseltamivir 75 milliGRAM(s) Oral two times a day    MEDICATIONS  (PRN):  benzonatate 100 milliGRAM(s) Oral every 8 hours PRN Cough  dextrose Oral Gel 15 Gram(s) Oral once PRN Blood Glucose LESS THAN 70 milliGRAM(s)/deciliter  guaiFENesin Oral Liquid (Sugar-Free) 100 milliGRAM(s) Oral every 6 hours PRN Cough      CAPILLARY BLOOD GLUCOSE      POCT Blood Glucose.: 446 mg/dL (07 Jan 2023 12:30)  POCT Blood Glucose.: 445 mg/dL (07 Jan 2023 12:28)  POCT Blood Glucose.: 212 mg/dL (07 Jan 2023 08:00)  POCT Blood Glucose.: 311 mg/dL (06 Jan 2023 22:01)  POCT Blood Glucose.: 309 mg/dL (06 Jan 2023 19:22)    I&O's Summary    06 Jan 2023 07:01  -  07 Jan 2023 07:00  --------------------------------------------------------  IN: 0 mL / OUT: 700 mL / NET: -700 mL        PHYSICAL EXAM:  Vital Signs Last 24 Hrs  T(C): 36.5 (07 Jan 2023 12:06), Max: 36.6 (07 Jan 2023 03:02)  T(F): 97.7 (07 Jan 2023 12:06), Max: 97.8 (07 Jan 2023 03:02)  HR: 80 (07 Jan 2023 12:27) (65 - 96)  BP: 128/61 (07 Jan 2023 12:27) (128/61 - 194/56)  BP(mean): --  RR: 20 (07 Jan 2023 12:06) (20 - 24)  SpO2: 98% (07 Jan 2023 12:06) (97% - 99%)    Parameters below as of 07 Jan 2023 12:06  Patient On (Oxygen Delivery Method): nasal cannula  O2 Flow (L/min): 2      CONSTITUTIONAL: NAD, well-developed male   RESPIRATORY: Increased respiratory effort, wheezing heard in all lung fields   CARDIOVASCULAR: Regular rate and rhythm, normal S1 and S2, no murmur/rub/gallop; No lower extremity edema; Peripheral pulses are 2+ bilaterally  ABDOMEN: Nontender to palpation, normoactive bowel sounds, no rebound/guarding; No hepatosplenomegaly  MUSCULOSKELETAL no clubbing or cyanosis of digits; no joint swelling or tenderness to palpation  PSYCH: A+O to person, place, and time; affect appropriate    LABS:                        10.9   16.66 )-----------( 381      ( 07 Jan 2023 06:31 )             31.9     01-07    139  |  105  |  23  ----------------------------<  214<H>  3.5   |  21<L>  |  0.94    Ca    8.8      07 Jan 2023 06:31  Phos  3.5     01-07  Mg     2.10     01-07    TPro  7.5  /  Alb  3.6  /  TBili  0.2  /  DBili  x   /  AST  11  /  ALT  17  /  AlkPhos  109  01-06    PT/INR - ( 06 Jan 2023 04:11 )   PT: 12.3 sec;   INR: 1.06 ratio         PTT - ( 06 Jan 2023 04:11 )  PTT:26.3 sec            RADIOLOGY & ADDITIONAL TESTS:  Results Reviewed:   Imaging Personally Reviewed:  Electrocardiogram Personally Reviewed:    COORDINATION OF CARE:  Care Discussed with Consultants/Other Providers [Y/N]:  Prior or Outpatient Records Reviewed [Y/N]:

## 2023-01-07 NOTE — PROGRESS NOTE ADULT - SUBJECTIVE AND OBJECTIVE BOX
Flexible laryngoscopy performed on patient with findings as below:    LARYNGOSCOPY EXAM:     Verbal consent was obtained from patient prior to procedure.    Flexible laryngoscopy was performed and revealed the following:    Nasopharynx had no mass or exudate.    Base of tongue was symmetric and not enlarged.    Vallecula was clear    Epiglottis, both aryepiglottic folds and both false vocal folds were normal    Arytenoids both without edema and erythema     True vocal folds were fully mobile and without lesions.     Post cricoid area was clear    Interarytenoid edema was absent    The patient tolerated the procedure well.

## 2023-01-07 NOTE — PROGRESS NOTE ADULT - ASSESSMENT
67M w/asthma, diabetes, HTN, CAD s/p 1 stent, presents with 2 to 3 days of shortness of breath, wheezing, and cough, admitted with severe sepsis 2/2 influenza A, hypoxic respiratory failure, and rapid atrial fibrillation

## 2023-01-07 NOTE — PROGRESS NOTE ADULT - SUBJECTIVE AND OBJECTIVE BOX
Cardiovascular Disease Progress Note  Date of Service: 01-07-23 @ 09:09    Overnight events: No acute events overnight.    Mr. Carreon continues to have weakness and SOB.   Otherwise review of systems negative    Objective Findings:  T(C): 36.3 (01-07-23 @ 06:55), Max: 36.6 (01-07-23 @ 03:02)  HR: 76 (01-07-23 @ 07:07) (65 - 77)  BP: 141/73 (01-07-23 @ 06:55) (137/69 - 188/70)  RR: 20 (01-07-23 @ 06:55) (20 - 24)  SpO2: 97% (01-07-23 @ 07:07) (97% - 99%)  Wt(kg): --  Daily     Daily       Physical Exam:  Gen: NAD; Patient resting comfortably  HEENT: EOMI, Normocephalic/ atraumatic  CV: RRR, normal S1 + S2, no m/r/g  Lungs:  Normal respiratory effort; wheezes to auscultation bilaterally  Abd: soft, non-tender; bowel sounds present  Ext: No edema; warm and well perfused    Telemetry: Sinus    Laboratory Data:                        10.9   16.66 )-----------( 381      ( 07 Jan 2023 06:31 )             31.9     01-07    139  |  105  |  23  ----------------------------<  214<H>  3.5   |  21<L>  |  0.94    Ca    8.8      07 Jan 2023 06:31  Phos  3.5     01-07  Mg     2.10     01-07    TPro  7.5  /  Alb  3.6  /  TBili  0.2  /  DBili  x   /  AST  11  /  ALT  17  /  AlkPhos  109  01-06    PT/INR - ( 06 Jan 2023 04:11 )   PT: 12.3 sec;   INR: 1.06 ratio         PTT - ( 06 Jan 2023 04:11 )  PTT:26.3 sec          Inpatient Medications:  MEDICATIONS  (STANDING):  albuterol/ipratropium for Nebulization 3 milliLiter(s) Nebulizer every 4 hours  amLODIPine   Tablet 10 milliGRAM(s) Oral daily  budesonide 160 MICROgram(s)/formoterol 4.5 MICROgram(s) Inhaler 2 Puff(s) Inhalation two times a day  cloNIDine 0.1 milliGRAM(s) Oral two times a day  coronavirus bivalent (EUA) Booster Vaccine (PFIZER) 0.3 milliLiter(s) IntraMuscular once  dextrose 5%. 1000 milliLiter(s) (100 mL/Hr) IV Continuous <Continuous>  dextrose 5%. 1000 milliLiter(s) (50 mL/Hr) IV Continuous <Continuous>  dextrose 50% Injectable 25 Gram(s) IV Push once  dextrose 50% Injectable 12.5 Gram(s) IV Push once  dextrose 50% Injectable 25 Gram(s) IV Push once  enoxaparin Injectable 40 milliGRAM(s) SubCutaneous every 24 hours  glucagon  Injectable 1 milliGRAM(s) IntraMuscular once  insulin glargine Injectable (LANTUS) 15 Unit(s) SubCutaneous every morning  insulin lispro (ADMELOG) corrective regimen sliding scale   SubCutaneous Before meals and at bedtime  insulin lispro Injectable (ADMELOG) 5 Unit(s) SubCutaneous three times a day before meals  losartan 100 milliGRAM(s) Oral daily  methylPREDNISolone sodium succinate Injectable 40 milliGRAM(s) IV Push two times a day  metoclopramide 10 milliGRAM(s) Oral at bedtime  montelukast 10 milliGRAM(s) Oral daily  oseltamivir 75 milliGRAM(s) Oral two times a day      Assessment: 67 year old man with CAD s/p PCI circa 2014 in [facility], HTN, Asthma, and DM who presents with sepsis secondary to influenza and a-fib with RVR..    Plan of Care:    #Afib RVR  - In setting of hypoxia and severe sepsis on admission.  - Mr. Carreon spontaneously converted to sinus rhythm in the ED.   - I would not commit him to long term anticoagulation at this time unless there is recurrence of a-fib.    #HTN urgency  - BP is labile, but slowly improving.     #Hypoxic respiratory failure  - 2/2 influenza  - Now on room air.  - Pulmonary input appreciated.     #CAD  - S/p PCI in 2014   - I would start ASA 81 and a high intensity statin.       Over 35 minutes spent on total encounter; more than 50% of the visit was spent counseling and/or coordinating care by the attending physician.      Marc Louis MD [facility]  Cardiovascular Disease  [phone]

## 2023-01-07 NOTE — PROGRESS NOTE ADULT - PROBLEM SELECTOR PLAN 6
BP elevated to 200s on admission. Spiked to 190's on 1/7 however 2/2 resp distress and BP improved after nebulizer   - Resume home Clonidine 0.1mg BID, Losartan 100mg qD and Amlodipine 10mg qD w/hold parameters

## 2023-01-07 NOTE — PATIENT PROFILE ADULT - FALL HARM RISK - HARM RISK INTERVENTIONS

## 2023-01-08 LAB
ANION GAP SERPL CALC-SCNC: 9 MMOL/L — SIGNIFICANT CHANGE UP (ref 7–14)
APTT BLD: 139 SEC — CRITICAL HIGH (ref 27–36.3)
APTT BLD: 37.5 SEC — HIGH (ref 27–36.3)
APTT BLD: 42.7 SEC — HIGH (ref 27–36.3)
BUN SERPL-MCNC: 23 MG/DL — SIGNIFICANT CHANGE UP (ref 7–23)
CALCIUM SERPL-MCNC: 8.5 MG/DL — SIGNIFICANT CHANGE UP (ref 8.4–10.5)
CHLORIDE SERPL-SCNC: 105 MMOL/L — SIGNIFICANT CHANGE UP (ref 98–107)
CHOLEST SERPL-MCNC: 170 MG/DL — SIGNIFICANT CHANGE UP
CO2 SERPL-SCNC: 24 MMOL/L — SIGNIFICANT CHANGE UP (ref 22–31)
CREAT SERPL-MCNC: 0.98 MG/DL — SIGNIFICANT CHANGE UP (ref 0.5–1.3)
EGFR: 83 ML/MIN/1.73M2 — SIGNIFICANT CHANGE UP
GLUCOSE BLDC GLUCOMTR-MCNC: 175 MG/DL — HIGH (ref 70–99)
GLUCOSE BLDC GLUCOMTR-MCNC: 294 MG/DL — HIGH (ref 70–99)
GLUCOSE BLDC GLUCOMTR-MCNC: 310 MG/DL — HIGH (ref 70–99)
GLUCOSE BLDC GLUCOMTR-MCNC: 337 MG/DL — HIGH (ref 70–99)
GLUCOSE SERPL-MCNC: 218 MG/DL — HIGH (ref 70–99)
HCT VFR BLD CALC: 32.8 % — LOW (ref 39–50)
HDLC SERPL-MCNC: 34 MG/DL — LOW
HGB BLD-MCNC: 11.1 G/DL — LOW (ref 13–17)
LACTATE SERPL-SCNC: 1.7 MMOL/L — SIGNIFICANT CHANGE UP (ref 0.5–2)
LIPID PNL WITH DIRECT LDL SERPL: 110 MG/DL — HIGH
MAGNESIUM SERPL-MCNC: 2 MG/DL — SIGNIFICANT CHANGE UP (ref 1.6–2.6)
MCHC RBC-ENTMCNC: 28.5 PG — SIGNIFICANT CHANGE UP (ref 27–34)
MCHC RBC-ENTMCNC: 33.8 GM/DL — SIGNIFICANT CHANGE UP (ref 32–36)
MCV RBC AUTO: 84.1 FL — SIGNIFICANT CHANGE UP (ref 80–100)
NON HDL CHOLESTEROL: 136 MG/DL — HIGH
NRBC # BLD: 0 /100 WBCS — SIGNIFICANT CHANGE UP (ref 0–0)
NRBC # FLD: 0 K/UL — SIGNIFICANT CHANGE UP (ref 0–0)
PHOSPHATE SERPL-MCNC: 4 MG/DL — SIGNIFICANT CHANGE UP (ref 2.5–4.5)
PLATELET # BLD AUTO: 396 K/UL — SIGNIFICANT CHANGE UP (ref 150–400)
POTASSIUM SERPL-MCNC: 3.2 MMOL/L — LOW (ref 3.5–5.3)
POTASSIUM SERPL-SCNC: 3.2 MMOL/L — LOW (ref 3.5–5.3)
RBC # BLD: 3.9 M/UL — LOW (ref 4.2–5.8)
RBC # FLD: 15.6 % — HIGH (ref 10.3–14.5)
SODIUM SERPL-SCNC: 138 MMOL/L — SIGNIFICANT CHANGE UP (ref 135–145)
TRIGL SERPL-MCNC: 131 MG/DL — SIGNIFICANT CHANGE UP
WBC # BLD: 14.48 K/UL — HIGH (ref 3.8–10.5)
WBC # FLD AUTO: 14.48 K/UL — HIGH (ref 3.8–10.5)

## 2023-01-08 PROCEDURE — 99232 SBSQ HOSP IP/OBS MODERATE 35: CPT

## 2023-01-08 RX ORDER — APIXABAN 2.5 MG/1
1 TABLET, FILM COATED ORAL
Qty: 60 | Refills: 0
Start: 2023-01-08 | End: 2023-02-06

## 2023-01-08 RX ORDER — POTASSIUM CHLORIDE 20 MEQ
20 PACKET (EA) ORAL ONCE
Refills: 0 | Status: COMPLETED | OUTPATIENT
Start: 2023-01-08 | End: 2023-01-08

## 2023-01-08 RX ORDER — INSULIN GLARGINE 100 [IU]/ML
25 INJECTION, SOLUTION SUBCUTANEOUS EVERY MORNING
Refills: 0 | Status: DISCONTINUED | OUTPATIENT
Start: 2023-01-08 | End: 2023-01-10

## 2023-01-08 RX ORDER — INSULIN LISPRO 100/ML
12 VIAL (ML) SUBCUTANEOUS
Refills: 0 | Status: DISCONTINUED | OUTPATIENT
Start: 2023-01-08 | End: 2023-01-09

## 2023-01-08 RX ORDER — FUROSEMIDE 40 MG
20 TABLET ORAL
Refills: 0 | Status: DISCONTINUED | OUTPATIENT
Start: 2023-01-08 | End: 2023-01-10

## 2023-01-08 RX ORDER — POTASSIUM CHLORIDE 20 MEQ
40 PACKET (EA) ORAL ONCE
Refills: 0 | Status: COMPLETED | OUTPATIENT
Start: 2023-01-08 | End: 2023-01-08

## 2023-01-08 RX ADMIN — LEVALBUTEROL 0.63 MILLIGRAM(S): 1.25 SOLUTION, CONCENTRATE RESPIRATORY (INHALATION) at 11:57

## 2023-01-08 RX ADMIN — LEVALBUTEROL 0.63 MILLIGRAM(S): 1.25 SOLUTION, CONCENTRATE RESPIRATORY (INHALATION) at 03:21

## 2023-01-08 RX ADMIN — Medication 0.1 MILLIGRAM(S): at 17:27

## 2023-01-08 RX ADMIN — Medication 81 MILLIGRAM(S): at 12:52

## 2023-01-08 RX ADMIN — AMLODIPINE BESYLATE 10 MILLIGRAM(S): 2.5 TABLET ORAL at 05:44

## 2023-01-08 RX ADMIN — Medication 0.1 MILLIGRAM(S): at 05:44

## 2023-01-08 RX ADMIN — Medication 10 MILLIGRAM(S): at 21:50

## 2023-01-08 RX ADMIN — Medication 40 MILLIGRAM(S): at 17:27

## 2023-01-08 RX ADMIN — HEPARIN SODIUM 1200 UNIT(S)/HR: 5000 INJECTION INTRAVENOUS; SUBCUTANEOUS at 07:41

## 2023-01-08 RX ADMIN — Medication 8: at 12:47

## 2023-01-08 RX ADMIN — Medication 40 MILLIGRAM(S): at 05:44

## 2023-01-08 RX ADMIN — Medication 15 MILLIGRAM(S): at 00:06

## 2023-01-08 RX ADMIN — Medication 8 UNIT(S): at 09:05

## 2023-01-08 RX ADMIN — Medication 75 MILLIGRAM(S): at 05:44

## 2023-01-08 RX ADMIN — MONTELUKAST 10 MILLIGRAM(S): 4 TABLET, CHEWABLE ORAL at 12:48

## 2023-01-08 RX ADMIN — LEVALBUTEROL 0.63 MILLIGRAM(S): 1.25 SOLUTION, CONCENTRATE RESPIRATORY (INHALATION) at 21:14

## 2023-01-08 RX ADMIN — LOSARTAN POTASSIUM 100 MILLIGRAM(S): 100 TABLET, FILM COATED ORAL at 05:44

## 2023-01-08 RX ADMIN — INSULIN GLARGINE 25 UNIT(S): 100 INJECTION, SOLUTION SUBCUTANEOUS at 21:52

## 2023-01-08 RX ADMIN — Medication 20 MILLIEQUIVALENT(S): at 17:27

## 2023-01-08 RX ADMIN — HEPARIN SODIUM 2500 UNIT(S): 5000 INJECTION INTRAVENOUS; SUBCUTANEOUS at 08:11

## 2023-01-08 RX ADMIN — BUDESONIDE AND FORMOTEROL FUMARATE DIHYDRATE 2 PUFF(S): 160; 4.5 AEROSOL RESPIRATORY (INHALATION) at 09:06

## 2023-01-08 RX ADMIN — Medication 12 UNIT(S): at 17:27

## 2023-01-08 RX ADMIN — HEPARIN SODIUM 1600 UNIT(S)/HR: 5000 INJECTION INTRAVENOUS; SUBCUTANEOUS at 16:26

## 2023-01-08 RX ADMIN — ATORVASTATIN CALCIUM 80 MILLIGRAM(S): 80 TABLET, FILM COATED ORAL at 21:52

## 2023-01-08 RX ADMIN — Medication 40 MILLIEQUIVALENT(S): at 12:48

## 2023-01-08 RX ADMIN — Medication 6: at 09:05

## 2023-01-08 RX ADMIN — Medication 20 MILLIGRAM(S): at 12:49

## 2023-01-08 RX ADMIN — Medication 75 MILLIGRAM(S): at 17:26

## 2023-01-08 RX ADMIN — HEPARIN SODIUM 5500 UNIT(S): 5000 INJECTION INTRAVENOUS; SUBCUTANEOUS at 16:26

## 2023-01-08 RX ADMIN — HEPARIN SODIUM 1600 UNIT(S)/HR: 5000 INJECTION INTRAVENOUS; SUBCUTANEOUS at 19:28

## 2023-01-08 RX ADMIN — INSULIN GLARGINE 20 UNIT(S): 100 INJECTION, SOLUTION SUBCUTANEOUS at 09:06

## 2023-01-08 RX ADMIN — Medication 8 UNIT(S): at 12:47

## 2023-01-08 RX ADMIN — Medication 2: at 21:52

## 2023-01-08 RX ADMIN — Medication 8: at 17:27

## 2023-01-08 RX ADMIN — HEPARIN SODIUM 1300 UNIT(S)/HR: 5000 INJECTION INTRAVENOUS; SUBCUTANEOUS at 08:07

## 2023-01-08 RX ADMIN — BUDESONIDE AND FORMOTEROL FUMARATE DIHYDRATE 2 PUFF(S): 160; 4.5 AEROSOL RESPIRATORY (INHALATION) at 21:55

## 2023-01-08 RX ADMIN — HEPARIN SODIUM 0 UNIT(S)/HR: 5000 INJECTION INTRAVENOUS; SUBCUTANEOUS at 23:36

## 2023-01-08 NOTE — PROVIDER CONTACT NOTE (OTHER) - BACKGROUND
68 y/o M with Hx of DM2, HTN CAD s/p stents, asthma. Admitted w/ Flu + Asthma exacerbation, Hospital course c/b LAURA s/p Cardizem IVP x2, on heparin gtt.

## 2023-01-08 NOTE — PROGRESS NOTE ADULT - SUBJECTIVE AND OBJECTIVE BOX
Cardiovascular Disease Progress Note  Date of Service: 01-08-23 @ 11:15    Overnight events: Rapid a-fib noted overnight.   Patient denies chest pain or SOB.    Otherwise review of systems negative    Objective Findings:  T(C): 36.3 (01-08-23 @ 05:04), Max: 36.5 (01-07-23 @ 12:06)  HR: 81 (01-08-23 @ 05:04) (54 - 160)  BP: 124/64 (01-08-23 @ 05:04) (114/75 - 194/56)  RR: 18 (01-08-23 @ 05:04) (18 - 20)  SpO2: 97% (01-08-23 @ 05:04) (91% - 100%)  Wt(kg): --  Daily     Daily       Physical Exam:  Gen: NAD; Patient resting comfortably  HEENT: EOMI, Normocephalic/ atraumatic  CV: RRR, normal S1 + S2, no m/r/g  Lungs:  Normal respiratory effort; clear to auscultation bilaterally  Abd: soft, non-tender; bowel sounds present  Ext: No edema; warm and well perfused    Telemetry: Sinus; rapid a-fib noted overnight    Laboratory Data:                        11.1   14.48 )-----------( 396      ( 08 Jan 2023 06:50 )             32.8     01-08    138  |  105  |  23  ----------------------------<  218<H>  3.2<L>   |  24  |  0.98    Ca    8.5      08 Jan 2023 06:50  Phos  4.0     01-08  Mg     2.00     01-08    TPro  7.5  /  Alb  3.6  /  TBili  0.2  /  DBili  x   /  AST  11  /  ALT  17  /  AlkPhos  109  01-06    PTT - ( 08 Jan 2023 06:50 )  PTT:42.7 sec          Inpatient Medications:  MEDICATIONS  (STANDING):  amLODIPine   Tablet 10 milliGRAM(s) Oral daily  aspirin enteric coated 81 milliGRAM(s) Oral daily  atorvastatin 80 milliGRAM(s) Oral at bedtime  budesonide 160 MICROgram(s)/formoterol 4.5 MICROgram(s) Inhaler 2 Puff(s) Inhalation two times a day  cloNIDine 0.1 milliGRAM(s) Oral two times a day  coronavirus bivalent (EUA) Booster Vaccine (PFIZER) 0.3 milliLiter(s) IntraMuscular once  dextrose 5%. 1000 milliLiter(s) (100 mL/Hr) IV Continuous <Continuous>  dextrose 5%. 1000 milliLiter(s) (50 mL/Hr) IV Continuous <Continuous>  dextrose 50% Injectable 25 Gram(s) IV Push once  dextrose 50% Injectable 12.5 Gram(s) IV Push once  dextrose 50% Injectable 25 Gram(s) IV Push once  glucagon  Injectable 1 milliGRAM(s) IntraMuscular once  heparin  Infusion.  Unit(s)/Hr (12 mL/Hr) IV Continuous <Continuous>  insulin glargine Injectable (LANTUS) 20 Unit(s) SubCutaneous every morning  insulin lispro (ADMELOG) corrective regimen sliding scale   SubCutaneous Before meals and at bedtime  insulin lispro Injectable (ADMELOG) 8 Unit(s) SubCutaneous three times a day before meals  levalbuterol Inhalation 0.63 milliGRAM(s) Inhalation every 6 hours  losartan 100 milliGRAM(s) Oral daily  methylPREDNISolone sodium succinate Injectable 40 milliGRAM(s) IV Push two times a day  metoclopramide 10 milliGRAM(s) Oral at bedtime  montelukast 10 milliGRAM(s) Oral daily  oseltamivir 75 milliGRAM(s) Oral two times a day  potassium chloride    Tablet ER 40 milliEquivalent(s) Oral once  potassium chloride    Tablet ER 20 milliEquivalent(s) Oral once      Assessment: 67 year old man with CAD s/p PCI circa 2014 in [facility], HTN, Asthma, and DM who presents with sepsis secondary to influenza and a-fib with RVR..    Plan of Care:    #Afib RVR  - In setting of hypoxia and severe sepsis on admission.  - The rhythm recurred overnight.  - I agree with full dose anticoagulation to prevent cardiac embolism.  - No objection to Xarelto or Eliquis if it is covered by his insurance plan.   - 3 second pause noted. Hold off on starting beta-blocker.     #HTN urgency  - BP is now much improved.     #Hypoxic respiratory failure  - 2/2 influenza  - Now on room air.  - Pulmonary input appreciated.     #CAD  - S/p PCI in 2014   - ASA 81 and a high intensity statin.         Over 35 minutes spent on total encounter; more than 50% of the visit was spent counseling and/or coordinating care by the attending physician.      Marc Louis MD [facility]  Cardiovascular Disease  [phone] Cardiovascular Disease Progress Note  Date of Service: 01-08-23 @ 11:15    Overnight events: Rapid a-fib noted overnight.   Patient denies chest pain or SOB.    Otherwise review of systems negative    Objective Findings:  T(C): 36.3 (01-08-23 @ 05:04), Max: 36.5 (01-07-23 @ 12:06)  HR: 81 (01-08-23 @ 05:04) (54 - 160)  BP: 124/64 (01-08-23 @ 05:04) (114/75 - 194/56)  RR: 18 (01-08-23 @ 05:04) (18 - 20)  SpO2: 97% (01-08-23 @ 05:04) (91% - 100%)  Wt(kg): --  Daily     Daily       Physical Exam:  Gen: NAD; Patient resting comfortably  HEENT: EOMI, Normocephalic/ atraumatic  CV: RRR, normal S1 + S2, no m/r/g  Lungs:  Normal respiratory effort; expiratory wheezes to auscultation bilaterally  Abd: soft, non-tender; bowel sounds present  Ext: No edema; warm and well perfused    Telemetry: Rapid a-fib noted overnight; 3.5 second pause noted.     Laboratory Data:                        11.1   14.48 )-----------( 396      ( 08 Jan 2023 06:50 )             32.8     01-08    138  |  105  |  23  ----------------------------<  218<H>  3.2<L>   |  24  |  0.98    Ca    8.5      08 Jan 2023 06:50  Phos  4.0     01-08  Mg     2.00     01-08    TPro  7.5  /  Alb  3.6  /  TBili  0.2  /  DBili  x   /  AST  11  /  ALT  17  /  AlkPhos  109  01-06    PTT - ( 08 Jan 2023 06:50 )  PTT:42.7 sec          Inpatient Medications:  MEDICATIONS  (STANDING):  amLODIPine   Tablet 10 milliGRAM(s) Oral daily  aspirin enteric coated 81 milliGRAM(s) Oral daily  atorvastatin 80 milliGRAM(s) Oral at bedtime  budesonide 160 MICROgram(s)/formoterol 4.5 MICROgram(s) Inhaler 2 Puff(s) Inhalation two times a day  cloNIDine 0.1 milliGRAM(s) Oral two times a day  coronavirus bivalent (EUA) Booster Vaccine (PFIZER) 0.3 milliLiter(s) IntraMuscular once  dextrose 5%. 1000 milliLiter(s) (100 mL/Hr) IV Continuous <Continuous>  dextrose 5%. 1000 milliLiter(s) (50 mL/Hr) IV Continuous <Continuous>  dextrose 50% Injectable 25 Gram(s) IV Push once  dextrose 50% Injectable 12.5 Gram(s) IV Push once  dextrose 50% Injectable 25 Gram(s) IV Push once  glucagon  Injectable 1 milliGRAM(s) IntraMuscular once  heparin  Infusion.  Unit(s)/Hr (12 mL/Hr) IV Continuous <Continuous>  insulin glargine Injectable (LANTUS) 20 Unit(s) SubCutaneous every morning  insulin lispro (ADMELOG) corrective regimen sliding scale   SubCutaneous Before meals and at bedtime  insulin lispro Injectable (ADMELOG) 8 Unit(s) SubCutaneous three times a day before meals  levalbuterol Inhalation 0.63 milliGRAM(s) Inhalation every 6 hours  losartan 100 milliGRAM(s) Oral daily  methylPREDNISolone sodium succinate Injectable 40 milliGRAM(s) IV Push two times a day  metoclopramide 10 milliGRAM(s) Oral at bedtime  montelukast 10 milliGRAM(s) Oral daily  oseltamivir 75 milliGRAM(s) Oral two times a day  potassium chloride    Tablet ER 40 milliEquivalent(s) Oral once  potassium chloride    Tablet ER 20 milliEquivalent(s) Oral once      Assessment: 67 year old man with CAD s/p PCI circa 2014 in [facility], HTN, Asthma, and DM who presents with sepsis secondary to influenza and a-fib with RVR..    Plan of Care:    #Afib RVR  - In setting of hypoxia and severe sepsis on admission.  - 3.5 second pause noted (not a conversion pause).  - Hold off on starting BB.  - Please consult house EP.   - I agree with full dose anticoagulation to prevent cardiac embolism.  - Please obtain an echo.   - No objection to Xarelto or Eliquis if there is no mitral valve disease on echo.       #HTN urgency  - BP is now much improved.     #Hypoxic respiratory failure  - 2/2 influenza  - Patient continues to require nasal cannula and has significant wheezing.   - I will start Lasix 20 mg IV BID given hypoxia and a-fib with RVR.      #CAD  - S/p PCI in 2014   - ASA 81 and a high intensity statin.     Discussed with Dr. Gordon.     Over 35 minutes spent on total encounter; more than 50% of the visit was spent counseling and/or coordinating care by the attending physician.      Marc Louis MD [facility]  Cardiovascular Disease  [phone]

## 2023-01-08 NOTE — PROGRESS NOTE ADULT - SUBJECTIVE AND OBJECTIVE BOX
Merlin Mathew, MD   Hospitalist  Pager #[phone]    PROGRESS NOTE:     Patient is a 67y old  Male who presents with a chief complaint of sob (08 Jan 2023 11:15)      SUBJECTIVE / OVERNIGHT EVENTS: NEON   Maltese  used # [id number]  Patient feels very SOB   Translation poor even with aide of     ADDITIONAL REVIEW OF SYSTEMS:    MEDICATIONS  (STANDING):  amLODIPine   Tablet 10 milliGRAM(s) Oral daily  aspirin enteric coated 81 milliGRAM(s) Oral daily  atorvastatin 80 milliGRAM(s) Oral at bedtime  budesonide 160 MICROgram(s)/formoterol 4.5 MICROgram(s) Inhaler 2 Puff(s) Inhalation two times a day  cloNIDine 0.1 milliGRAM(s) Oral two times a day  coronavirus bivalent (EUA) Booster Vaccine (PFIZER) 0.3 milliLiter(s) IntraMuscular once  dextrose 5%. 1000 milliLiter(s) (100 mL/Hr) IV Continuous <Continuous>  dextrose 5%. 1000 milliLiter(s) (50 mL/Hr) IV Continuous <Continuous>  dextrose 50% Injectable 25 Gram(s) IV Push once  dextrose 50% Injectable 12.5 Gram(s) IV Push once  dextrose 50% Injectable 25 Gram(s) IV Push once  furosemide   Injectable 20 milliGRAM(s) IV Push two times a day  glucagon  Injectable 1 milliGRAM(s) IntraMuscular once  heparin  Infusion.  Unit(s)/Hr (12 mL/Hr) IV Continuous <Continuous>  insulin glargine Injectable (LANTUS) 20 Unit(s) SubCutaneous every morning  insulin lispro (ADMELOG) corrective regimen sliding scale   SubCutaneous Before meals and at bedtime  insulin lispro Injectable (ADMELOG) 8 Unit(s) SubCutaneous three times a day before meals  levalbuterol Inhalation 0.63 milliGRAM(s) Inhalation every 6 hours  losartan 100 milliGRAM(s) Oral daily  methylPREDNISolone sodium succinate Injectable 40 milliGRAM(s) IV Push two times a day  metoclopramide 10 milliGRAM(s) Oral at bedtime  montelukast 10 milliGRAM(s) Oral daily  oseltamivir 75 milliGRAM(s) Oral two times a day  potassium chloride    Tablet ER 20 milliEquivalent(s) Oral once    MEDICATIONS  (PRN):  benzonatate 100 milliGRAM(s) Oral every 8 hours PRN Cough  dextrose Oral Gel 15 Gram(s) Oral once PRN Blood Glucose LESS THAN 70 milliGRAM(s)/deciliter  guaiFENesin Oral Liquid (Sugar-Free) 100 milliGRAM(s) Oral every 6 hours PRN Cough  heparin   Injectable 5500 Unit(s) IV Push every 6 hours PRN For aPTT less than 40  heparin   Injectable 2500 Unit(s) IV Push every 6 hours PRN For aPTT between 40 - 57      CAPILLARY BLOOD GLUCOSE      POCT Blood Glucose.: 337 mg/dL (08 Jan 2023 12:33)  POCT Blood Glucose.: 294 mg/dL (08 Jan 2023 08:50)  POCT Blood Glucose.: 208 mg/dL (07 Jan 2023 22:43)  POCT Blood Glucose.: 287 mg/dL (07 Jan 2023 17:09)    I&O's Summary      PHYSICAL EXAM:  Vital Signs Last 24 Hrs  T(C): 36.3 (08 Jan 2023 12:40), Max: 36.4 (07 Jan 2023 20:26)  T(F): 97.4 (08 Jan 2023 12:40), Max: 97.6 (07 Jan 2023 20:26)  HR: 96 (08 Jan 2023 12:40) (54 - 160)  BP: 130/71 (08 Jan 2023 12:40) (114/75 - 130/72)  BP(mean): --  RR: 18 (08 Jan 2023 12:40) (18 - 20)  SpO2: 97% (08 Jan 2023 12:40) (91% - 100%)    Parameters below as of 08 Jan 2023 12:40  Patient On (Oxygen Delivery Method): nasal cannula  O2 Flow (L/min): 2      CONSTITUTIONAL: NAD, well-developed male   RESPIRATORY: Increased respiratory effort, wheezing heard in all lung fields   CARDIOVASCULAR: Regular rate and rhythm, normal S1 and S2, no murmur/rub/gallop; No lower extremity edema; Peripheral pulses are 2+ bilaterally  ABDOMEN: Nontender to palpation, normoactive bowel sounds, no rebound/guarding; No hepatosplenomegaly  MUSCULOSKELETAL no clubbing or cyanosis of digits; no joint swelling or tenderness to palpation  PSYCH: A+O to person, place, and time; affect appropriate    LABS:                        11.1   14.48 )-----------( 396      ( 08 Jan 2023 06:50 )             32.8     01-08    138  |  105  |  23  ----------------------------<  218<H>  3.2<L>   |  24  |  0.98    Ca    8.5      08 Jan 2023 06:50  Phos  4.0     01-08  Mg     2.00     01-08      PTT - ( 08 Jan 2023 15:08 )  PTT:37.5 sec            RADIOLOGY & ADDITIONAL TESTS:  Results Reviewed:   Imaging Personally Reviewed:  Electrocardiogram Personally Reviewed:    COORDINATION OF CARE:  Care Discussed with Consultants/Other Providers [Y/N]:  Prior or Outpatient Records Reviewed [Y/N]:

## 2023-01-09 LAB
ANION GAP SERPL CALC-SCNC: 14 MMOL/L — SIGNIFICANT CHANGE UP (ref 7–14)
APTT BLD: 160.8 SEC — SIGNIFICANT CHANGE UP (ref 27–36.3)
APTT BLD: 55.4 SEC — HIGH (ref 27–36.3)
APTT BLD: 65.1 SEC — HIGH (ref 27–36.3)
BUN SERPL-MCNC: 22 MG/DL — SIGNIFICANT CHANGE UP (ref 7–23)
CALCIUM SERPL-MCNC: 8.6 MG/DL — SIGNIFICANT CHANGE UP (ref 8.4–10.5)
CHLORIDE SERPL-SCNC: 99 MMOL/L — SIGNIFICANT CHANGE UP (ref 98–107)
CO2 SERPL-SCNC: 22 MMOL/L — SIGNIFICANT CHANGE UP (ref 22–31)
CREAT SERPL-MCNC: 1.02 MG/DL — SIGNIFICANT CHANGE UP (ref 0.5–1.3)
EGFR: 80 ML/MIN/1.73M2 — SIGNIFICANT CHANGE UP
GLUCOSE BLDC GLUCOMTR-MCNC: 107 MG/DL — HIGH (ref 70–99)
GLUCOSE BLDC GLUCOMTR-MCNC: 244 MG/DL — HIGH (ref 70–99)
GLUCOSE BLDC GLUCOMTR-MCNC: 274 MG/DL — HIGH (ref 70–99)
GLUCOSE BLDC GLUCOMTR-MCNC: 484 MG/DL — CRITICAL HIGH (ref 70–99)
GLUCOSE BLDC GLUCOMTR-MCNC: 485 MG/DL — CRITICAL HIGH (ref 70–99)
GLUCOSE SERPL-MCNC: 284 MG/DL — HIGH (ref 70–99)
HCT VFR BLD CALC: 39.9 % — SIGNIFICANT CHANGE UP (ref 39–50)
HGB BLD-MCNC: 13.7 G/DL — SIGNIFICANT CHANGE UP (ref 13–17)
LACTATE SERPL-SCNC: 2.8 MMOL/L — HIGH (ref 0.5–2)
MAGNESIUM SERPL-MCNC: 2 MG/DL — SIGNIFICANT CHANGE UP (ref 1.6–2.6)
MCHC RBC-ENTMCNC: 28.7 PG — SIGNIFICANT CHANGE UP (ref 27–34)
MCHC RBC-ENTMCNC: 34.3 GM/DL — SIGNIFICANT CHANGE UP (ref 32–36)
MCV RBC AUTO: 83.6 FL — SIGNIFICANT CHANGE UP (ref 80–100)
NRBC # BLD: 0 /100 WBCS — SIGNIFICANT CHANGE UP (ref 0–0)
NRBC # FLD: 0.03 K/UL — HIGH (ref 0–0)
PHOSPHATE SERPL-MCNC: 3.4 MG/DL — SIGNIFICANT CHANGE UP (ref 2.5–4.5)
PLATELET # BLD AUTO: 509 K/UL — HIGH (ref 150–400)
POTASSIUM SERPL-MCNC: 4.7 MMOL/L — SIGNIFICANT CHANGE UP (ref 3.5–5.3)
POTASSIUM SERPL-SCNC: 4.7 MMOL/L — SIGNIFICANT CHANGE UP (ref 3.5–5.3)
RBC # BLD: 4.77 M/UL — SIGNIFICANT CHANGE UP (ref 4.2–5.8)
RBC # FLD: 15.4 % — HIGH (ref 10.3–14.5)
SODIUM SERPL-SCNC: 135 MMOL/L — SIGNIFICANT CHANGE UP (ref 135–145)
WBC # BLD: 22.32 K/UL — HIGH (ref 3.8–10.5)
WBC # FLD AUTO: 22.32 K/UL — HIGH (ref 3.8–10.5)

## 2023-01-09 PROCEDURE — 99233 SBSQ HOSP IP/OBS HIGH 50: CPT

## 2023-01-09 PROCEDURE — 99233 SBSQ HOSP IP/OBS HIGH 50: CPT | Mod: GC

## 2023-01-09 RX ORDER — INSULIN LISPRO 100/ML
16 VIAL (ML) SUBCUTANEOUS
Refills: 0 | Status: DISCONTINUED | OUTPATIENT
Start: 2023-01-09 | End: 2023-01-11

## 2023-01-09 RX ADMIN — ATORVASTATIN CALCIUM 80 MILLIGRAM(S): 80 TABLET, FILM COATED ORAL at 22:39

## 2023-01-09 RX ADMIN — Medication 10 MILLIGRAM(S): at 22:39

## 2023-01-09 RX ADMIN — Medication 75 MILLIGRAM(S): at 17:32

## 2023-01-09 RX ADMIN — Medication 75 MILLIGRAM(S): at 05:49

## 2023-01-09 RX ADMIN — Medication 6: at 22:39

## 2023-01-09 RX ADMIN — HEPARIN SODIUM 1500 UNIT(S)/HR: 5000 INJECTION INTRAVENOUS; SUBCUTANEOUS at 07:59

## 2023-01-09 RX ADMIN — Medication 0.1 MILLIGRAM(S): at 17:33

## 2023-01-09 RX ADMIN — HEPARIN SODIUM 1400 UNIT(S)/HR: 5000 INJECTION INTRAVENOUS; SUBCUTANEOUS at 07:20

## 2023-01-09 RX ADMIN — LEVALBUTEROL 0.63 MILLIGRAM(S): 1.25 SOLUTION, CONCENTRATE RESPIRATORY (INHALATION) at 15:16

## 2023-01-09 RX ADMIN — Medication 20 MILLIGRAM(S): at 05:53

## 2023-01-09 RX ADMIN — HEPARIN SODIUM 1300 UNIT(S)/HR: 5000 INJECTION INTRAVENOUS; SUBCUTANEOUS at 16:57

## 2023-01-09 RX ADMIN — HEPARIN SODIUM 0 UNIT(S)/HR: 5000 INJECTION INTRAVENOUS; SUBCUTANEOUS at 15:36

## 2023-01-09 RX ADMIN — Medication 12: at 09:38

## 2023-01-09 RX ADMIN — HEPARIN SODIUM 1300 UNIT(S)/HR: 5000 INJECTION INTRAVENOUS; SUBCUTANEOUS at 19:38

## 2023-01-09 RX ADMIN — BUDESONIDE AND FORMOTEROL FUMARATE DIHYDRATE 2 PUFF(S): 160; 4.5 AEROSOL RESPIRATORY (INHALATION) at 09:41

## 2023-01-09 RX ADMIN — LOSARTAN POTASSIUM 100 MILLIGRAM(S): 100 TABLET, FILM COATED ORAL at 05:49

## 2023-01-09 RX ADMIN — HEPARIN SODIUM 1300 UNIT(S)/HR: 5000 INJECTION INTRAVENOUS; SUBCUTANEOUS at 23:48

## 2023-01-09 RX ADMIN — Medication 0.1 MILLIGRAM(S): at 05:49

## 2023-01-09 RX ADMIN — AMLODIPINE BESYLATE 10 MILLIGRAM(S): 2.5 TABLET ORAL at 05:48

## 2023-01-09 RX ADMIN — Medication 16 UNIT(S): at 17:32

## 2023-01-09 RX ADMIN — MONTELUKAST 10 MILLIGRAM(S): 4 TABLET, CHEWABLE ORAL at 17:33

## 2023-01-09 RX ADMIN — LEVALBUTEROL 0.63 MILLIGRAM(S): 1.25 SOLUTION, CONCENTRATE RESPIRATORY (INHALATION) at 03:40

## 2023-01-09 RX ADMIN — Medication 81 MILLIGRAM(S): at 17:34

## 2023-01-09 RX ADMIN — HEPARIN SODIUM 1400 UNIT(S)/HR: 5000 INJECTION INTRAVENOUS; SUBCUTANEOUS at 00:43

## 2023-01-09 RX ADMIN — HEPARIN SODIUM 2500 UNIT(S): 5000 INJECTION INTRAVENOUS; SUBCUTANEOUS at 08:12

## 2023-01-09 RX ADMIN — LEVALBUTEROL 0.63 MILLIGRAM(S): 1.25 SOLUTION, CONCENTRATE RESPIRATORY (INHALATION) at 08:48

## 2023-01-09 RX ADMIN — Medication 20 MILLIGRAM(S): at 13:16

## 2023-01-09 RX ADMIN — Medication 12 UNIT(S): at 09:39

## 2023-01-09 RX ADMIN — Medication 40 MILLIGRAM(S): at 05:53

## 2023-01-09 RX ADMIN — LEVALBUTEROL 0.63 MILLIGRAM(S): 1.25 SOLUTION, CONCENTRATE RESPIRATORY (INHALATION) at 22:07

## 2023-01-09 RX ADMIN — INSULIN GLARGINE 25 UNIT(S): 100 INJECTION, SOLUTION SUBCUTANEOUS at 09:41

## 2023-01-09 RX ADMIN — BUDESONIDE AND FORMOTEROL FUMARATE DIHYDRATE 2 PUFF(S): 160; 4.5 AEROSOL RESPIRATORY (INHALATION) at 22:39

## 2023-01-09 RX ADMIN — Medication 12 UNIT(S): at 13:17

## 2023-01-09 RX ADMIN — Medication 4: at 13:16

## 2023-01-09 NOTE — PROGRESS NOTE ADULT - ASSESSMENT
*** INCOMPLETE ***  SYNTHESIS  #Asthma Exacerbation 2/2 Influenza A  Mr. Carreon is a 65F with h/o Asthma, HTN, CAD (s/p 1 stent, dates unknown), DM (on Januvia, Glipizide) who presents with shortness of breath, wheezing 2/2 likely asthma exacerbation ico Influenza A+. He continues to have some wheeze, but this is definitely improved from prior.     #Hyperglycemia #T2DM  Pt with persistently elevated BGLs likely as a consequence of steroid-therapy & underlying insulin resistance. Will have to monitor these numbers as Basal-Bolus regimen increased as we decrease his steroid load. Will trial lower therapeutic dose of steroid and assess respiratory tolerability given his persistent, significant hyperglycemia.    SUMMARY OF RECOMMENDATIONS  - Recommend CONTINUE Duonebs to Q6H SCHEDULED  - Recommend DECREASE Methylprednisolone 40mg IV QD (will continue to titrate to clinical response)  - If pt condition worsens, would consider Magnesium 2g IV for acute asthma exacerbation  - If pt condition worsens, could also consider NIPPV if increased WOB.    Calderon Quijano MD PGY-2  Case D/W Dr. Birch SYNTHESIS  #Asthma Exacerbation 2/2 Influenza A  Mr. Carreon is a 65F with h/o Asthma, HTN, CAD (s/p 1 stent, dates unknown), DM (on Januvia, Glipizide) who presents with shortness of breath, wheezing 2/2 likely asthma exacerbation ico Influenza A+. He continues to have some wheeze, but this is definitely improved from prior.     #Hyperglycemia #T2DM  Pt with persistently elevated BGLs likely as a consequence of steroid-therapy & underlying insulin resistance. Will have to monitor these numbers as Basal-Bolus regimen increased as we decrease his steroid load. Will trial lower therapeutic dose of steroid and assess respiratory tolerability given his persistent, significant hyperglycemia.    SUMMARY OF RECOMMENDATIONS  - Recommend CONTINUE Duonebs to Q6H SCHEDULED  - Recommend DECREASE Methylprednisolone 40mg IV QD (will continue to titrate to clinical response)  - If pt condition worsens, would consider Magnesium 2g IV for acute asthma exacerbation  - If pt condition worsens, could also consider NIPPV if increased WOB.    Calderon Quijano MD PGY-2  Case D/W Dr. Birch

## 2023-01-09 NOTE — CONSULT NOTE ADULT - ATTENDING COMMENTS
Mr. Carreon is a 65F with h/o Asthma, HTN, CAD (s/p 1 stent, dates unknown), DM (on Januvia, Glipizide) who presents with shortness of breath, wheezing 2/2 likely asthma exacerbation with influenza.     Patient is doing better in the evening than before. Wheezing has reduced compared to the morning.     - Continue steroids as above  - Duonebs Q6 standing (can hold for sleep and refusal)  - Cont. Influenza treatment    Thank you for your consult. We will continue to follow the patient's care with you.
67 year old man with CAD s/p PCI circa 2014 in [facility], HTN, Asthma, and DM who presents with sepsis secondary to influenza and a-fib with RVR. Patient with pAF at this time. Would rate control with BB, pauses not concerning at this time and not a contraindication to BB uptitration while being monitored. Agree with AC. Will likely benefit from ablation long term, which can be done as outpatient.

## 2023-01-09 NOTE — PROVIDER CONTACT NOTE (CRITICAL VALUE NOTIFICATION) - ASSESSMENT
Patient A&Ox4, Afib on tele.  Patient denies chest pain, shortness of breath, discomfort at this time.
Patient denies SOB, chest pain, and discomfort. No acute changes noted. Patient is currently resting comfortably in bed. Patient safety and comfort maintained. Call bell within reach.

## 2023-01-09 NOTE — CONSULT NOTE ADULT - ASSESSMENT
67 year old man with CAD s/p PCI circa 2014 in [facility], HTN, Asthma, and DM who presents with sepsis secondary to influenza and a-fib with RVR for which EP is consulted.     Recommendations  - Continue monitoring on telemetry  - Will followup formal TTE  - [id number] 4; continue heparin gtt for therapeutic AC; will determine long-term AC based on TTE results  - As always, K > 4 Mg > 2      *Note not final until signed by attending 67 year old man with CAD s/p PCI circa 2014 in [facility], HTN, Asthma, and DM who presents with sepsis secondary to influenza and a-fib with RVR for which EP is consulted.   EP consulted for new diagnosis of AF with RVR. Son at bedside to corroborate history. Pt states he is feeling better since admission with regards to dyspnea and and respiratory symptoms, has been ambulating to bathroom without significant symptoms. telemetry shows Afib 110-120s with spikes to 140-150 with exertion. Hemodynamically stable and asymptomatic during episodes, denies palpitations and chest pain.     Recommendations  - Continue monitoring on telemetry  - Will followup formal TTE  - [id number] 4; continue heparin gtt for therapeutic AC; will determine long-term AC based on TTE results  - As always, K > 4 Mg > 2      *Note not final until signed by attending 67 year old man with CAD s/p PCI circa 2014 in [facility], HTN, Asthma, and DM who presents with sepsis secondary to influenza and a-fib with RVR for which EP is consulted.   EP consulted for new diagnosis of AF with RVR. Son at bedside to corroborate history. Pt states he is feeling better since admission with regards to dyspnea and and respiratory symptoms, has been ambulating to bathroom without significant symptoms. telemetry shows Afib 110-120s with spikes to 140-150 with exertion. Hemodynamically stable and asymptomatic during episodes, denies palpitations and chest pain.     Recommendations  - Continue monitoring on telemetry  - Will followup formal TTE  - If pt continues to be be in afib and poorly rate controlled, can initiate Metoprolol tartrate   - [id number] 4; would initiate Eliquis 5 mg bid   - As always, K > 4 Mg > 2      *Note not final until signed by attending

## 2023-01-09 NOTE — PROGRESS NOTE ADULT - SUBJECTIVE AND OBJECTIVE BOX
SUBJECTIVE  SUMMARY OF HOSPITALIZATION / HPI        PAST MEDICAL/SURGICAL HISTORY  PAST MEDICAL & SURGICAL HISTORY:  Type 2 diabetes mellitus  CAD (coronary artery disease)  Asthma  Essential hypertension      FAMILY HISTORY:      SOCIAL HISTORY:  Smoking:  pt does now report significant smoking history    ALLERGIES  Allergies    No Known Allergies    Intolerances        HOME MEDICATIONS:      HOSPITAL MEDICATIONS:  MEDICATIONS  (STANDING):  amLODIPine   Tablet 10 milliGRAM(s) Oral daily  aspirin enteric coated 81 milliGRAM(s) Oral daily  atorvastatin 80 milliGRAM(s) Oral at bedtime  budesonide 160 MICROgram(s)/formoterol 4.5 MICROgram(s) Inhaler 2 Puff(s) Inhalation two times a day  cloNIDine 0.1 milliGRAM(s) Oral two times a day  coronavirus bivalent (EUA) Booster Vaccine (PFIZER) 0.3 milliLiter(s) IntraMuscular once  dextrose 5%. 1000 milliLiter(s) (100 mL/Hr) IV Continuous <Continuous>  dextrose 5%. 1000 milliLiter(s) (50 mL/Hr) IV Continuous <Continuous>  dextrose 50% Injectable 25 Gram(s) IV Push once  dextrose 50% Injectable 12.5 Gram(s) IV Push once  dextrose 50% Injectable 25 Gram(s) IV Push once  furosemide   Injectable 20 milliGRAM(s) IV Push two times a day  glucagon  Injectable 1 milliGRAM(s) IntraMuscular once  heparin  Infusion.  Unit(s)/Hr (12 mL/Hr) IV Continuous <Continuous>  insulin glargine Injectable (LANTUS) 25 Unit(s) SubCutaneous every morning  insulin lispro (ADMELOG) corrective regimen sliding scale   SubCutaneous Before meals and at bedtime  insulin lispro Injectable (ADMELOG) 12 Unit(s) SubCutaneous three times a day before meals  levalbuterol Inhalation 0.63 milliGRAM(s) Inhalation every 6 hours  losartan 100 milliGRAM(s) Oral daily  methylPREDNISolone sodium succinate Injectable 40 milliGRAM(s) IV Push two times a day  metoclopramide 10 milliGRAM(s) Oral at bedtime  montelukast 10 milliGRAM(s) Oral daily  oseltamivir 75 milliGRAM(s) Oral two times a day    MEDICATIONS  (PRN):  benzonatate 100 milliGRAM(s) Oral every 8 hours PRN Cough  dextrose Oral Gel 15 Gram(s) Oral once PRN Blood Glucose LESS THAN 70 milliGRAM(s)/deciliter  guaiFENesin Oral Liquid (Sugar-Free) 100 milliGRAM(s) Oral every 6 hours PRN Cough  heparin   Injectable 5500 Unit(s) IV Push every 6 hours PRN For aPTT less than 40  heparin   Injectable 2500 Unit(s) IV Push every 6 hours PRN For aPTT between 40 - 57      REVIEW OF SYSTEMS:  [x] All other systems negative  [ ] Unable to assess ROS because ________    OBJECTIVE:  VITAL SIGNS  ICU Vital Signs Last 24 Hrs  T(C): 36.4 (09 Jan 2023 12:21), Max: 36.6 (09 Jan 2023 05:32)  T(F): 97.6 (09 Jan 2023 12:21), Max: 97.9 (09 Jan 2023 05:32)  HR: 69 (09 Jan 2023 12:21) (69 - 117)  BP: 127/85 (09 Jan 2023 12:21) (127/85 - 141/76)  BP(mean): --  ABP: --  ABP(mean): --  RR: 16 (09 Jan 2023 12:21) (16 - 18)  SpO2: 98% (09 Jan 2023 12:21) (97% - 98%)    O2 Parameters below as of 09 Jan 2023 12:21  Patient On (Oxygen Delivery Method): room air      PHYSICAL EXAM:  GEN: Awake, AOx3, NAD.  HEENT: NCAT  ---EYES: no scleral icterus  CARDIO: RRR.  RESP: Diffuse wheezing continues, improved  from 1/6/23  ABD: Soft, NTND.  MSK: No obvious deformity or ROM deficit.   SKIN: Warm, dry.    LAB DATA                        13.7   22.32 )-----------( 509      ( 09 Jan 2023 07:05 )             39.9     01-09    135  |  99  |  22  ----------------------------<  284<H>  4.7   |  22  |  1.02    Ca    8.6      09 Jan 2023 07:05  Phos  3.4     01-09  Mg     2.00     01-09      PTT - ( 09 Jan 2023 07:05 )  PTT:55.4 sec    CAPILLARY BLOOD GLUCOSE      POCT Blood Glucose.: 244 mg/dL (09 Jan 2023 12:31)  POCT Blood Glucose.: 484 mg/dL (09 Jan 2023 08:48)  POCT Blood Glucose.: 485 mg/dL (09 Jan 2023 08:46)  POCT Blood Glucose.: 175 mg/dL (08 Jan 2023 21:24)  POCT Blood Glucose.: 310 mg/dL (08 Jan 2023 17:25)      ADDITIONAL TESTS & IMAGING  RADIOLOGY: No interval imaging    MICROBIOLOGY: No interval micro data (Influenza A+)    CARDIOLOGY DATA:  12 Lead ECG:   Ventricular Rate 110 BPM  Atrial Rate 117 BPM  QRS Duration 90 ms  Q-T Interval 290 ms  QTC Calculation(Bazett) 392 ms  R Axis 44 degrees  T Axis -9 degrees  Diagnosis Line Atrial fibrillation with rapid ventricular response  Nonspecific T wave abnormality  Abnormal ECG (01-05-23 @ 15:21)     SUBJECTIVE  SUMMARY OF HOSPITALIZATION / HPI  Mr. Carreon appears greatly improved today. CHRISTIAN      PAST MEDICAL/SURGICAL HISTORY  PAST MEDICAL & SURGICAL HISTORY:  Type 2 diabetes mellitus  CAD (coronary artery disease)  Asthma  Essential hypertension      FAMILY HISTORY:      SOCIAL HISTORY:  Smoking:  pt does now report significant smoking history    ALLERGIES  Allergies  No Known Allergies  Intolerances        HOME MEDICATIONS:      HOSPITAL MEDICATIONS:  MEDICATIONS  (STANDING):  amLODIPine   Tablet 10 milliGRAM(s) Oral daily  aspirin enteric coated 81 milliGRAM(s) Oral daily  atorvastatin 80 milliGRAM(s) Oral at bedtime  budesonide 160 MICROgram(s)/formoterol 4.5 MICROgram(s) Inhaler 2 Puff(s) Inhalation two times a day  cloNIDine 0.1 milliGRAM(s) Oral two times a day  coronavirus bivalent (EUA) Booster Vaccine (PFIZER) 0.3 milliLiter(s) IntraMuscular once  dextrose 5%. 1000 milliLiter(s) (100 mL/Hr) IV Continuous <Continuous>  dextrose 5%. 1000 milliLiter(s) (50 mL/Hr) IV Continuous <Continuous>  dextrose 50% Injectable 25 Gram(s) IV Push once  dextrose 50% Injectable 12.5 Gram(s) IV Push once  dextrose 50% Injectable 25 Gram(s) IV Push once  furosemide   Injectable 20 milliGRAM(s) IV Push two times a day  glucagon  Injectable 1 milliGRAM(s) IntraMuscular once  heparin  Infusion.  Unit(s)/Hr (12 mL/Hr) IV Continuous <Continuous>  insulin glargine Injectable (LANTUS) 25 Unit(s) SubCutaneous every morning  insulin lispro (ADMELOG) corrective regimen sliding scale   SubCutaneous Before meals and at bedtime  insulin lispro Injectable (ADMELOG) 12 Unit(s) SubCutaneous three times a day before meals  levalbuterol Inhalation 0.63 milliGRAM(s) Inhalation every 6 hours  losartan 100 milliGRAM(s) Oral daily  methylPREDNISolone sodium succinate Injectable 40 milliGRAM(s) IV Push two times a day  metoclopramide 10 milliGRAM(s) Oral at bedtime  montelukast 10 milliGRAM(s) Oral daily  oseltamivir 75 milliGRAM(s) Oral two times a day    MEDICATIONS  (PRN):  benzonatate 100 milliGRAM(s) Oral every 8 hours PRN Cough  dextrose Oral Gel 15 Gram(s) Oral once PRN Blood Glucose LESS THAN 70 milliGRAM(s)/deciliter  guaiFENesin Oral Liquid (Sugar-Free) 100 milliGRAM(s) Oral every 6 hours PRN Cough  heparin   Injectable 5500 Unit(s) IV Push every 6 hours PRN For aPTT less than 40  heparin   Injectable 2500 Unit(s) IV Push every 6 hours PRN For aPTT between 40 - 57      REVIEW OF SYSTEMS:  [x] All other systems negative  [ ] Unable to assess ROS because ________    OBJECTIVE:  VITAL SIGNS  ICU Vital Signs Last 24 Hrs  T(C): 36.4 (09 Jan 2023 12:21), Max: 36.6 (09 Jan 2023 05:32)  T(F): 97.6 (09 Jan 2023 12:21), Max: 97.9 (09 Jan 2023 05:32)  HR: 69 (09 Jan 2023 12:21) (69 - 117)  BP: 127/85 (09 Jan 2023 12:21) (127/85 - 141/76)  BP(mean): --  ABP: --  ABP(mean): --  RR: 16 (09 Jan 2023 12:21) (16 - 18)  SpO2: 98% (09 Jan 2023 12:21) (97% - 98%)    O2 Parameters below as of 09 Jan 2023 12:21  Patient On (Oxygen Delivery Method): room air      PHYSICAL EXAM:  GEN: Awake, AOx3, NAD.  HEENT: NCAT  ---EYES: no scleral icterus  CARDIO: Irregular rhythm, normal rate; tele reviewed, frequent PVCs  RESP: Pt getting nebulizer treatment, but wheezing greatly improved with intact aeration when compared to 1/6/23  ABD: Soft, NTND.  MSK: No obvious deformity or ROM deficit.   SKIN: Warm, dry.    LAB DATA                        13.7   22.32 )-----------( 509      ( 09 Jan 2023 07:05 )             39.9     01-09    135  |  99  |  22  ----------------------------<  284<H>  4.7   |  22  |  1.02    Ca    8.6      09 Jan 2023 07:05  Phos  3.4     01-09  Mg     2.00     01-09      PTT - ( 09 Jan 2023 07:05 )  PTT:55.4 sec    CAPILLARY BLOOD GLUCOSE      POCT Blood Glucose.: 244 mg/dL (09 Jan 2023 12:31)  POCT Blood Glucose.: 484 mg/dL (09 Jan 2023 08:48)  POCT Blood Glucose.: 485 mg/dL (09 Jan 2023 08:46)  POCT Blood Glucose.: 175 mg/dL (08 Jan 2023 21:24)  POCT Blood Glucose.: 310 mg/dL (08 Jan 2023 17:25)      ADDITIONAL TESTS & IMAGING  RADIOLOGY: No interval imaging    MICROBIOLOGY: No interval micro data (Influenza A+)    CARDIOLOGY DATA:  12 Lead ECG:   Ventricular Rate 110 BPM  Atrial Rate 117 BPM  QRS Duration 90 ms  Q-T Interval 290 ms  QTC Calculation(Bazett) 392 ms  R Axis 44 degrees  T Axis -9 degrees  Diagnosis Line Atrial fibrillation with rapid ventricular response  Nonspecific T wave abnormality  Abnormal ECG (01-05-23 @ 15:21)

## 2023-01-09 NOTE — PROGRESS NOTE ADULT - PROBLEM SELECTOR PLAN 1
2/2 sepsis and hypoxia, reverted back into afib briefly overnight  - Cont Heparin drip, can transition to DOAC pending insurance coverage and TTE   - Cardiology following  [ ] TTE   [ ] EP consulted for afib w/ 3s pause seen on tele

## 2023-01-09 NOTE — CONSULT NOTE ADULT - NS ATTEND AMEND GEN_ALL_CORE FT
67 year old man with CAD s/p PCI circa 2014 in [facility], HTN, Asthma, and DM who presents with sepsis secondary to influenza and a-fib with RVR. Patient with pAF at this time. Would rate control with BB. Agree with AC. Will likely benefit from ablation long term, which can be done as outpatient. 67 year old man with CAD s/p PCI circa 2014 in [facility], HTN, Asthma, and DM who presents with sepsis secondary to influenza and a-fib with RVR. Patient with pAF at this time. Would rate control with BB, pauses not concerning at this time and not a contraindication to BB uptitration while being monitored. Agree with AC. Will likely benefit from ablation long term, which can be done as outpatient.

## 2023-01-09 NOTE — PROGRESS NOTE ADULT - SUBJECTIVE AND OBJECTIVE BOX
PROGRESS NOTE:     Patient is a 67y old  Male who presents with a chief complaint of sob (09 Jan 2023 14:39)      SUBJECTIVE / OVERNIGHT EVENTS: reports still having intermittent sob. Denies cough, chest pain, fever, chills.     ADDITIONAL REVIEW OF SYSTEMS:    MEDICATIONS  (STANDING):  amLODIPine   Tablet 10 milliGRAM(s) Oral daily  aspirin enteric coated 81 milliGRAM(s) Oral daily  atorvastatin 80 milliGRAM(s) Oral at bedtime  budesonide 160 MICROgram(s)/formoterol 4.5 MICROgram(s) Inhaler 2 Puff(s) Inhalation two times a day  cloNIDine 0.1 milliGRAM(s) Oral two times a day  coronavirus bivalent (EUA) Booster Vaccine (PFIZER) 0.3 milliLiter(s) IntraMuscular once  dextrose 5%. 1000 milliLiter(s) (100 mL/Hr) IV Continuous <Continuous>  dextrose 5%. 1000 milliLiter(s) (50 mL/Hr) IV Continuous <Continuous>  dextrose 50% Injectable 25 Gram(s) IV Push once  dextrose 50% Injectable 12.5 Gram(s) IV Push once  dextrose 50% Injectable 25 Gram(s) IV Push once  furosemide   Injectable 20 milliGRAM(s) IV Push two times a day  glucagon  Injectable 1 milliGRAM(s) IntraMuscular once  heparin  Infusion.  Unit(s)/Hr (12 mL/Hr) IV Continuous <Continuous>  insulin glargine Injectable (LANTUS) 25 Unit(s) SubCutaneous every morning  insulin lispro (ADMELOG) corrective regimen sliding scale   SubCutaneous Before meals and at bedtime  insulin lispro Injectable (ADMELOG) 16 Unit(s) SubCutaneous three times a day before meals  levalbuterol Inhalation 0.63 milliGRAM(s) Inhalation every 6 hours  losartan 100 milliGRAM(s) Oral daily  metoclopramide 10 milliGRAM(s) Oral at bedtime  montelukast 10 milliGRAM(s) Oral daily  oseltamivir 75 milliGRAM(s) Oral two times a day    MEDICATIONS  (PRN):  benzonatate 100 milliGRAM(s) Oral every 8 hours PRN Cough  dextrose Oral Gel 15 Gram(s) Oral once PRN Blood Glucose LESS THAN 70 milliGRAM(s)/deciliter  guaiFENesin Oral Liquid (Sugar-Free) 100 milliGRAM(s) Oral every 6 hours PRN Cough  heparin   Injectable 5500 Unit(s) IV Push every 6 hours PRN For aPTT less than 40  heparin   Injectable 2500 Unit(s) IV Push every 6 hours PRN For aPTT between 40 - 57      CAPILLARY BLOOD GLUCOSE      POCT Blood Glucose.: 244 mg/dL (09 Jan 2023 12:31)  POCT Blood Glucose.: 484 mg/dL (09 Jan 2023 08:48)  POCT Blood Glucose.: 485 mg/dL (09 Jan 2023 08:46)  POCT Blood Glucose.: 175 mg/dL (08 Jan 2023 21:24)  POCT Blood Glucose.: 310 mg/dL (08 Jan 2023 17:25)    I&O's Summary      PHYSICAL EXAM:  Vital Signs Last 24 Hrs  T(C): 36.4 (09 Jan 2023 12:21), Max: 36.6 (09 Jan 2023 05:32)  T(F): 97.6 (09 Jan 2023 12:21), Max: 97.9 (09 Jan 2023 05:32)  HR: 69 (09 Jan 2023 12:21) (69 - 117)  BP: 127/85 (09 Jan 2023 12:21) (127/85 - 141/76)  BP(mean): --  RR: 16 (09 Jan 2023 12:21) (16 - 18)  SpO2: 98% (09 Jan 2023 12:21) (97% - 98%)    Parameters below as of 09 Jan 2023 12:21  Patient On (Oxygen Delivery Method): room air        CONSTITUTIONAL: NAD, well-developed  RESPIRATORY: Normal respiratory effort; lungs diffusely coarse breath sound  CARDIOVASCULAR: Regular rate and rhythm, normal S1 and S2, no murmur/rub/gallop; No lower extremity edema  ABDOMEN: Nontender to palpation, normoactive bowel sounds, no rebound/guarding  MUSCLOSKELETAL: no clubbing or cyanosis of digits; no joint swelling or tenderness to palpation  SKIN: no rash, erythema, lesion  PSYCH: A+O to person, place, and time; affect appropriate    LABS:                        13.7   22.32 )-----------( 509      ( 09 Jan 2023 07:05 )             39.9     01-09    135  |  99  |  22  ----------------------------<  284<H>  4.7   |  22  |  1.02    Ca    8.6      09 Jan 2023 07:05  Phos  3.4     01-09  Mg     2.00     01-09      PTT - ( 09 Jan 2023 14:08 )  PTT:160.8 sec            RADIOLOGY & ADDITIONAL TESTS:  Results Reviewed:   Imaging Personally Reviewed:  Electrocardiogram Personally Reviewed:    COORDINATION OF CARE:  Care Discussed with Consultants/Other Providers [Y/N]:  Prior or Outpatient Records Reviewed [Y/N]:

## 2023-01-09 NOTE — PROGRESS NOTE ADULT - PROBLEM SELECTOR PLAN 6
Home meds: Glyburide and Metformin, Reported A1c ~8.5   - C/b hyperglycemia, likely 2/2 steroid use   - Increase Lantus to 25 units QD   - Increase Lispro to 16 units w/ meals   - Mod SSI   [ ] DECREASE Insulin once steroids discontinued   -Son reports gastroparesis, c/w Reglan

## 2023-01-09 NOTE — PROGRESS NOTE ADULT - SUBJECTIVE AND OBJECTIVE BOX
Cardiovascular Disease Progress Note  Date of Service: 01-09-23 @ 07:28    Overnight events: No acute events overnight.    Patient says his breathing is getting better.   Otherwise review of systems negative    Objective Findings:  T(C): 36.6 (01-09-23 @ 05:32), Max: 36.6 (01-09-23 @ 05:32)  HR: 91 (01-09-23 @ 05:32) (71 - 117)  BP: 129/74 (01-09-23 @ 05:32) (129/74 - 141/76)  RR: 18 (01-09-23 @ 05:32) (18 - 18)  SpO2: 98% (01-09-23 @ 05:32) (95% - 98%)  Wt(kg): --  Daily     Daily       Physical Exam:  Gen: NAD; Patient resting comfortably  HEENT: EOMI, Normocephalic/ atraumatic  CV: RRR, normal S1 + S2, no m/r/g  Lungs:  Normal respiratory effort; wheezes to auscultation bilaterally  Abd: soft, non-tender; bowel sounds present  Ext: No edema; warm and well perfused    Telemetry: Sinus    Laboratory Data:                        11.1   14.48 )-----------( 396      ( 08 Jan 2023 06:50 )             32.8     01-08    138  |  105  |  23  ----------------------------<  218<H>  3.2<L>   |  24  |  0.98    Ca    8.5      08 Jan 2023 06:50  Phos  4.0     01-08  Mg     2.00     01-08      PTT - ( 08 Jan 2023 22:10 )  PTT:139.0 sec          Inpatient Medications:  MEDICATIONS  (STANDING):  amLODIPine   Tablet 10 milliGRAM(s) Oral daily  aspirin enteric coated 81 milliGRAM(s) Oral daily  atorvastatin 80 milliGRAM(s) Oral at bedtime  budesonide 160 MICROgram(s)/formoterol 4.5 MICROgram(s) Inhaler 2 Puff(s) Inhalation two times a day  cloNIDine 0.1 milliGRAM(s) Oral two times a day  coronavirus bivalent (EUA) Booster Vaccine (PFIZER) 0.3 milliLiter(s) IntraMuscular once  dextrose 5%. 1000 milliLiter(s) (100 mL/Hr) IV Continuous <Continuous>  dextrose 5%. 1000 milliLiter(s) (50 mL/Hr) IV Continuous <Continuous>  dextrose 50% Injectable 25 Gram(s) IV Push once  dextrose 50% Injectable 12.5 Gram(s) IV Push once  dextrose 50% Injectable 25 Gram(s) IV Push once  furosemide   Injectable 20 milliGRAM(s) IV Push two times a day  glucagon  Injectable 1 milliGRAM(s) IntraMuscular once  heparin  Infusion.  Unit(s)/Hr (12 mL/Hr) IV Continuous <Continuous>  insulin glargine Injectable (LANTUS) 25 Unit(s) SubCutaneous every morning  insulin lispro (ADMELOG) corrective regimen sliding scale   SubCutaneous Before meals and at bedtime  insulin lispro Injectable (ADMELOG) 12 Unit(s) SubCutaneous three times a day before meals  levalbuterol Inhalation 0.63 milliGRAM(s) Inhalation every 6 hours  losartan 100 milliGRAM(s) Oral daily  methylPREDNISolone sodium succinate Injectable 40 milliGRAM(s) IV Push two times a day  metoclopramide 10 milliGRAM(s) Oral at bedtime  montelukast 10 milliGRAM(s) Oral daily  oseltamivir 75 milliGRAM(s) Oral two times a day      Assessment: 67 year old man with CAD s/p PCI circa 2014 in [facility], HTN, Asthma, and DM who presents with sepsis secondary to influenza and a-fib with RVR..    Plan of Care:    #Afib RVR  - In setting of hypoxia and severe sepsis on admission.  - 3.5 second pause noted (not a conversion pause).  - Hold off on starting BB.  - Please consult house EP.   - I agree with full dose anticoagulation to prevent cardiac embolism.  - Please obtain an echo.   - No objection to Xarelto or Eliquis if there is no mitral valve disease on echo.       #HTN urgency  - BP is now much improved.     #Hypoxic respiratory failure  - 2/2 influenza  - Patient continues to require nasal cannula and has significant wheezing.   - Lasix 20 mg IV BID given hypoxia and a-fib with RVR.      #CAD  - S/p PCI in 2014   - ASA 81 and a high intensity statin.       Over 25 minutes spent on total encounter; more than 50% of the visit was spent counseling and/or coordinating care by the attending physician.      Marc Louis MD [facility]  Cardiovascular Disease  [phone]

## 2023-01-09 NOTE — CONSULT NOTE ADULT - SUBJECTIVE AND OBJECTIVE BOX
Patient seen and evaluated at bedside    Reason for consult:    HPI:  Son at bedside to translate per pt preference. Cindy ([phone])  67M w/asthma, diabetes, HTN, CAD s/p 1 stent, presents with 2 to 3 days of shortness of breath, wheezing, and cough. Reports nausea, no vomiting and left sided of chest pain.   Per son, pt sob started 2-3 days ago and he started using ProAir more with minor relief. Chest pain has no resolved.  Normally he, uses his ProAir from time to time if he gets sob, used to see pulmonologist, but overall asthma has been controlled on Symbicort and Singulair.   Pt was brought to ER via EMS on NRB, documentation shows desaturation to 80s. Pt was given 2 500cc boluses, Duoneb x5, Decadron 6mg x1 and insulin 5U for hyperglycemia.   Pt started on heparin gtt for rapid atrial fibrillation.      (2023 08:49)    EP consulted for new diagnosis of AF with RVR..    PMHx:   Type 2 diabetes mellitus    CAD (coronary artery disease)    Asthma    Essential hypertension        PSHx:       Allergies:  No Known Allergies      Home Meds:    Current Medications:   amLODIPine   Tablet 10 milliGRAM(s) Oral daily  aspirin enteric coated 81 milliGRAM(s) Oral daily  atorvastatin 80 milliGRAM(s) Oral at bedtime  benzonatate 100 milliGRAM(s) Oral every 8 hours PRN  budesonide 160 MICROgram(s)/formoterol 4.5 MICROgram(s) Inhaler 2 Puff(s) Inhalation two times a day  cloNIDine 0.1 milliGRAM(s) Oral two times a day  coronavirus bivalent (EUA) Booster Vaccine (PFIZER) 0.3 milliLiter(s) IntraMuscular once  dextrose 5%. 1000 milliLiter(s) IV Continuous <Continuous>  dextrose 5%. 1000 milliLiter(s) IV Continuous <Continuous>  dextrose 50% Injectable 25 Gram(s) IV Push once  dextrose 50% Injectable 12.5 Gram(s) IV Push once  dextrose 50% Injectable 25 Gram(s) IV Push once  dextrose Oral Gel 15 Gram(s) Oral once PRN  furosemide   Injectable 20 milliGRAM(s) IV Push two times a day  glucagon  Injectable 1 milliGRAM(s) IntraMuscular once  guaiFENesin Oral Liquid (Sugar-Free) 100 milliGRAM(s) Oral every 6 hours PRN  heparin   Injectable 5500 Unit(s) IV Push every 6 hours PRN  heparin   Injectable 2500 Unit(s) IV Push every 6 hours PRN  heparin  Infusion.  Unit(s)/Hr IV Continuous <Continuous>  insulin glargine Injectable (LANTUS) 25 Unit(s) SubCutaneous every morning  insulin lispro (ADMELOG) corrective regimen sliding scale   SubCutaneous Before meals and at bedtime  insulin lispro Injectable (ADMELOG) 12 Unit(s) SubCutaneous three times a day before meals  levalbuterol Inhalation 0.63 milliGRAM(s) Inhalation every 6 hours  losartan 100 milliGRAM(s) Oral daily  methylPREDNISolone sodium succinate Injectable 40 milliGRAM(s) IV Push two times a day  metoclopramide 10 milliGRAM(s) Oral at bedtime  montelukast 10 milliGRAM(s) Oral daily  oseltamivir 75 milliGRAM(s) Oral two times a day      FAMILY HISTORY:      Social History:  Smoking History:  Alcohol Use:  Drug Use:    Review of Systems:  REVIEW OF SYSTEMS:  CONSTITUTIONAL: No weakness, fevers or chills  EYES/ENT: No visual changes;  No dysphagia  NECK: No pain or stiffness  RESPIRATORY: No cough, wheezing, hemoptysis; No shortness of breath  CARDIOVASCULAR: No chest pain or palpitations; No lower extremity edema  GASTROINTESTINAL: No abdominal or epigastric pain. No nausea, vomiting, or hematemesis; No diarrhea or constipation. No melena or hematochezia.  BACK: No back pain  GENITOURINARY: No dysuria, frequency or hematuria  NEUROLOGICAL: No numbness or weakness  SKIN: No itching, burning, rashes, or lesions   All other review of systems is negative unless indicated above.    [ ] All other systems negative  [ ] Unable to assess ROS due to    Physical Exam:  T(F): 97.9 (), Max: 97.9 ()  HR: 88 () (71 - 117)  BP: 129/74 () (129/74 - 141/76)  RR: 18 ()  SpO2: 98% ()  GENERAL: No acute distress, well-developed  HEAD:  Atraumatic, Normocephalic  ENT: EOMI, PERRLA, conjunctiva and sclera clear, Neck supple, No JVD, moist mucosa  CHEST/LUNG: Clear to auscultation bilaterally; No wheeze, equal breath sounds bilaterally   BACK: No spinal tenderness  HEART: Regular rate and rhythm; No murmurs, rubs, or gallops  ABDOMEN: Soft, Nontender, Nondistended; Bowel sounds present  EXTREMITIES:  No clubbing, cyanosis, or edema  PSYCH: Nl behavior, nl affect  NEUROLOGY: AAOx3, non-focal, cranial nerves intact  SKIN: Normal color, No rashes or lesions  LINES:    Cardiovascular Diagnostic Testing:    CXR: Personally reviewed    Labs: Personally reviewed                        13.7   22.32 )-----------( 509      ( 2023 07:05 )             39.9         135  |  99  |  22  ----------------------------<  284<H>  4.7   |  22  |  1.02    Ca    8.6      2023 07:05  Phos  3.4     -  Mg     2.00     -      PTT - ( 2023 07:05 )  PTT:55.4 sec  Serum Pro-Brain Natriuretic Peptide: 946 pg/mL ( @ 12:00)    Total Cholesterol: 170  LDL: --  HDL: 34  T       Patient seen and evaluated at bedside    Reason for consult: Afib    HPI:  Son at bedside to translate per pt preference. Cindy ([phone])  67M w/asthma, diabetes, HTN, CAD s/p 1 stent, presents with 2 to 3 days of shortness of breath, wheezing, and cough. Reports nausea, no vomiting and left sided of chest pain.   Per son, pt sob started 2-3 days ago and he started using ProAir more with minor relief. Chest pain has no resolved.  Normally he, uses his ProAir from time to time if he gets sob, used to see pulmonologist, but overall asthma has been controlled on Symbicort and Singulair.   Pt was brought to ER via EMS on NRB, documentation shows desaturation to 80s. Pt was given 2 500cc boluses, Duoneb x5, Decadron 6mg x1 and insulin 5U for hyperglycemia.   Pt started on heparin gtt for rapid atrial fibrillation.      (2023 08:49)    EP consulted for new diagnosis of AF with RVR. Son at bedside to corroborate history. Pt states he is feeling better since admission with regards to dyspnea and and respiratory symptoms, has been ambulating to bathroom without significant symptoms. telemetry shows Afib 110-120s with spikes to 140-150 with exertion. Hemodynamically stable and asymptomatic during episodes, denies palpitations and chest pain.     PMHx:   Type 2 diabetes mellitus    CAD (coronary artery disease)    Asthma    Essential hypertension        PSHx:       Allergies:  No Known Allergies      Home Meds:    Current Medications:   amLODIPine   Tablet 10 milliGRAM(s) Oral daily  aspirin enteric coated 81 milliGRAM(s) Oral daily  atorvastatin 80 milliGRAM(s) Oral at bedtime  benzonatate 100 milliGRAM(s) Oral every 8 hours PRN  budesonide 160 MICROgram(s)/formoterol 4.5 MICROgram(s) Inhaler 2 Puff(s) Inhalation two times a day  cloNIDine 0.1 milliGRAM(s) Oral two times a day  coronavirus bivalent (EUA) Booster Vaccine (PFIZER) 0.3 milliLiter(s) IntraMuscular once  dextrose 5%. 1000 milliLiter(s) IV Continuous <Continuous>  dextrose 5%. 1000 milliLiter(s) IV Continuous <Continuous>  dextrose 50% Injectable 25 Gram(s) IV Push once  dextrose 50% Injectable 12.5 Gram(s) IV Push once  dextrose 50% Injectable 25 Gram(s) IV Push once  dextrose Oral Gel 15 Gram(s) Oral once PRN  furosemide   Injectable 20 milliGRAM(s) IV Push two times a day  glucagon  Injectable 1 milliGRAM(s) IntraMuscular once  guaiFENesin Oral Liquid (Sugar-Free) 100 milliGRAM(s) Oral every 6 hours PRN  heparin   Injectable 5500 Unit(s) IV Push every 6 hours PRN  heparin   Injectable 2500 Unit(s) IV Push every 6 hours PRN  heparin  Infusion.  Unit(s)/Hr IV Continuous <Continuous>  insulin glargine Injectable (LANTUS) 25 Unit(s) SubCutaneous every morning  insulin lispro (ADMELOG) corrective regimen sliding scale   SubCutaneous Before meals and at bedtime  insulin lispro Injectable (ADMELOG) 12 Unit(s) SubCutaneous three times a day before meals  levalbuterol Inhalation 0.63 milliGRAM(s) Inhalation every 6 hours  losartan 100 milliGRAM(s) Oral daily  methylPREDNISolone sodium succinate Injectable 40 milliGRAM(s) IV Push two times a day  metoclopramide 10 milliGRAM(s) Oral at bedtime  montelukast 10 milliGRAM(s) Oral daily  oseltamivir 75 milliGRAM(s) Oral two times a day      FAMILY HISTORY:      Social History:  Smoking History:  Alcohol Use:  Drug Use:    Review of Systems:  REVIEW OF SYSTEMS:  CONSTITUTIONAL: No weakness, fevers or chills  EYES/ENT: No visual changes;  No dysphagia  NECK: No pain or stiffness  RESPIRATORY: No cough, wheezing, hemoptysis; No shortness of breath  CARDIOVASCULAR: No chest pain or palpitations; No lower extremity edema  GASTROINTESTINAL: No abdominal or epigastric pain. No nausea, vomiting, or hematemesis; No diarrhea or constipation. No melena or hematochezia.  BACK: No back pain  GENITOURINARY: No dysuria, frequency or hematuria  NEUROLOGICAL: No numbness or weakness  SKIN: No itching, burning, rashes, or lesions   All other review of systems is negative unless indicated above.    [ ] All other systems negative  [ ] Unable to assess ROS due to    Physical Exam:  T(F): 97.9 (-), Max: 97.9 ()  HR: 88 () (71 - 117)  BP: 129/74 () (129/74 - 141/76)  RR: 18 ()  SpO2: 98% ()  GENERAL: No acute distress, well-developed  HEAD:  Atraumatic, Normocephalic  ENT: EOMI, PERRLA, conjunctiva and sclera clear, Neck supple, No JVD, moist mucosa  CHEST/LUNG: Clear to auscultation bilaterally; No wheeze, equal breath sounds bilaterally   BACK: No spinal tenderness  HEART: Regular rate and rhythm; No murmurs, rubs, or gallops  ABDOMEN: Soft, Nontender, Nondistended; Bowel sounds present  EXTREMITIES:  No clubbing, cyanosis, or edema  PSYCH: Nl behavior, nl affect  NEUROLOGY: AAOx3, non-focal, cranial nerves intact  SKIN: Normal color, No rashes or lesions  LINES:    Cardiovascular Diagnostic Testing:    CXR: Personally reviewed    Labs: Personally reviewed                        13.7   22.32 )-----------( 509      ( 2023 07:05 )             39.9         135  |  99  |  22  ----------------------------<  284<H>  4.7   |  22  |  1.02    Ca    8.6      2023 07:05  Phos  3.4       Mg     2.00           PTT - ( 2023 07:05 )  PTT:55.4 sec  Serum Pro-Brain Natriuretic Peptide: 946 pg/mL ( @ 12:00)    Total Cholesterol: 170  LDL: --  HDL: 34  T

## 2023-01-09 NOTE — PROVIDER CONTACT NOTE (CRITICAL VALUE NOTIFICATION) - ACTION/TREATMENT ORDERED:
INES Mccarthy made aware.  RN will follow heparin nomogram.
INES Sanches made aware. Will follow heparin nomogram protocol.

## 2023-01-09 NOTE — PROVIDER CONTACT NOTE (CRITICAL VALUE NOTIFICATION) - BACKGROUND
(Admit Diagnosis) Asthma with acute exacerbation  (PMH) Essential hypertension  (PMH) Asthma  (PMH) CAD (coronary artery disease)
Patient on a heparin drip, pTT is 160.8

## 2023-01-10 LAB
ANION GAP SERPL CALC-SCNC: 16 MMOL/L — HIGH (ref 7–14)
APTT BLD: 55.6 SEC — HIGH (ref 27–36.3)
APTT BLD: 75.1 SEC — HIGH (ref 27–36.3)
BUN SERPL-MCNC: 26 MG/DL — HIGH (ref 7–23)
CALCIUM SERPL-MCNC: 8.9 MG/DL — SIGNIFICANT CHANGE UP (ref 8.4–10.5)
CHLORIDE SERPL-SCNC: 99 MMOL/L — SIGNIFICANT CHANGE UP (ref 98–107)
CO2 SERPL-SCNC: 21 MMOL/L — LOW (ref 22–31)
CREAT SERPL-MCNC: 1.14 MG/DL — SIGNIFICANT CHANGE UP (ref 0.5–1.3)
EGFR: 70 ML/MIN/1.73M2 — SIGNIFICANT CHANGE UP
GLUCOSE BLDC GLUCOMTR-MCNC: 241 MG/DL — HIGH (ref 70–99)
GLUCOSE BLDC GLUCOMTR-MCNC: 279 MG/DL — HIGH (ref 70–99)
GLUCOSE BLDC GLUCOMTR-MCNC: 391 MG/DL — HIGH (ref 70–99)
GLUCOSE BLDC GLUCOMTR-MCNC: 402 MG/DL — HIGH (ref 70–99)
GLUCOSE BLDC GLUCOMTR-MCNC: 429 MG/DL — HIGH (ref 70–99)
GLUCOSE SERPL-MCNC: 345 MG/DL — HIGH (ref 70–99)
HCT VFR BLD CALC: 37.7 % — LOW (ref 39–50)
HGB BLD-MCNC: 12.9 G/DL — LOW (ref 13–17)
MAGNESIUM SERPL-MCNC: 2 MG/DL — SIGNIFICANT CHANGE UP (ref 1.6–2.6)
MCHC RBC-ENTMCNC: 29 PG — SIGNIFICANT CHANGE UP (ref 27–34)
MCHC RBC-ENTMCNC: 34.2 GM/DL — SIGNIFICANT CHANGE UP (ref 32–36)
MCV RBC AUTO: 84.7 FL — SIGNIFICANT CHANGE UP (ref 80–100)
NRBC # BLD: 0 /100 WBCS — SIGNIFICANT CHANGE UP (ref 0–0)
NRBC # FLD: 0.05 K/UL — HIGH (ref 0–0)
PHOSPHATE SERPL-MCNC: 4.2 MG/DL — SIGNIFICANT CHANGE UP (ref 2.5–4.5)
PLATELET # BLD AUTO: 478 K/UL — HIGH (ref 150–400)
POTASSIUM SERPL-MCNC: 3.1 MMOL/L — LOW (ref 3.5–5.3)
POTASSIUM SERPL-SCNC: 3.1 MMOL/L — LOW (ref 3.5–5.3)
RBC # BLD: 4.45 M/UL — SIGNIFICANT CHANGE UP (ref 4.2–5.8)
RBC # FLD: 15.6 % — HIGH (ref 10.3–14.5)
SODIUM SERPL-SCNC: 136 MMOL/L — SIGNIFICANT CHANGE UP (ref 135–145)
WBC # BLD: 19.33 K/UL — HIGH (ref 3.8–10.5)
WBC # FLD AUTO: 19.33 K/UL — HIGH (ref 3.8–10.5)

## 2023-01-10 PROCEDURE — 99232 SBSQ HOSP IP/OBS MODERATE 35: CPT

## 2023-01-10 PROCEDURE — 99233 SBSQ HOSP IP/OBS HIGH 50: CPT | Mod: GC

## 2023-01-10 PROCEDURE — 93306 TTE W/DOPPLER COMPLETE: CPT | Mod: 26

## 2023-01-10 PROCEDURE — 99233 SBSQ HOSP IP/OBS HIGH 50: CPT

## 2023-01-10 RX ORDER — INSULIN GLARGINE 100 [IU]/ML
30 INJECTION, SOLUTION SUBCUTANEOUS EVERY MORNING
Refills: 0 | Status: DISCONTINUED | OUTPATIENT
Start: 2023-01-10 | End: 2023-01-11

## 2023-01-10 RX ORDER — APIXABAN 2.5 MG/1
5 TABLET, FILM COATED ORAL EVERY 12 HOURS
Refills: 0 | Status: DISCONTINUED | OUTPATIENT
Start: 2023-01-10 | End: 2023-01-13

## 2023-01-10 RX ORDER — METOPROLOL TARTRATE 50 MG
25 TABLET ORAL
Refills: 0 | Status: DISCONTINUED | OUTPATIENT
Start: 2023-01-10 | End: 2023-01-13

## 2023-01-10 RX ORDER — FUROSEMIDE 40 MG
40 TABLET ORAL DAILY
Refills: 0 | Status: DISCONTINUED | OUTPATIENT
Start: 2023-01-11 | End: 2023-01-13

## 2023-01-10 RX ORDER — POTASSIUM CHLORIDE 20 MEQ
40 PACKET (EA) ORAL EVERY 4 HOURS
Refills: 0 | Status: COMPLETED | OUTPATIENT
Start: 2023-01-10 | End: 2023-01-10

## 2023-01-10 RX ORDER — TIOTROPIUM BROMIDE 18 UG/1
2 CAPSULE ORAL; RESPIRATORY (INHALATION) DAILY
Refills: 0 | Status: DISCONTINUED | OUTPATIENT
Start: 2023-01-10 | End: 2023-01-13

## 2023-01-10 RX ADMIN — HEPARIN SODIUM 1400 UNIT(S)/HR: 5000 INJECTION INTRAVENOUS; SUBCUTANEOUS at 16:25

## 2023-01-10 RX ADMIN — Medication 81 MILLIGRAM(S): at 13:25

## 2023-01-10 RX ADMIN — Medication 16 UNIT(S): at 08:47

## 2023-01-10 RX ADMIN — Medication 75 MILLIGRAM(S): at 05:49

## 2023-01-10 RX ADMIN — Medication 10 MILLIGRAM(S): at 22:01

## 2023-01-10 RX ADMIN — Medication 6: at 13:24

## 2023-01-10 RX ADMIN — Medication 25 MILLIGRAM(S): at 18:08

## 2023-01-10 RX ADMIN — HEPARIN SODIUM 1400 UNIT(S)/HR: 5000 INJECTION INTRAVENOUS; SUBCUTANEOUS at 08:50

## 2023-01-10 RX ADMIN — Medication 0.1 MILLIGRAM(S): at 18:05

## 2023-01-10 RX ADMIN — HEPARIN SODIUM 2500 UNIT(S): 5000 INJECTION INTRAVENOUS; SUBCUTANEOUS at 08:52

## 2023-01-10 RX ADMIN — LEVALBUTEROL 0.63 MILLIGRAM(S): 1.25 SOLUTION, CONCENTRATE RESPIRATORY (INHALATION) at 22:09

## 2023-01-10 RX ADMIN — BUDESONIDE AND FORMOTEROL FUMARATE DIHYDRATE 2 PUFF(S): 160; 4.5 AEROSOL RESPIRATORY (INHALATION) at 22:01

## 2023-01-10 RX ADMIN — APIXABAN 5 MILLIGRAM(S): 2.5 TABLET, FILM COATED ORAL at 18:04

## 2023-01-10 RX ADMIN — ATORVASTATIN CALCIUM 80 MILLIGRAM(S): 80 TABLET, FILM COATED ORAL at 22:01

## 2023-01-10 RX ADMIN — MONTELUKAST 10 MILLIGRAM(S): 4 TABLET, CHEWABLE ORAL at 13:28

## 2023-01-10 RX ADMIN — Medication 40 MILLIEQUIVALENT(S): at 10:03

## 2023-01-10 RX ADMIN — INSULIN GLARGINE 30 UNIT(S): 100 INJECTION, SOLUTION SUBCUTANEOUS at 22:01

## 2023-01-10 RX ADMIN — Medication 75 MILLIGRAM(S): at 18:05

## 2023-01-10 RX ADMIN — Medication 16 UNIT(S): at 13:24

## 2023-01-10 RX ADMIN — LEVALBUTEROL 0.63 MILLIGRAM(S): 1.25 SOLUTION, CONCENTRATE RESPIRATORY (INHALATION) at 08:45

## 2023-01-10 RX ADMIN — AMLODIPINE BESYLATE 10 MILLIGRAM(S): 2.5 TABLET ORAL at 05:49

## 2023-01-10 RX ADMIN — HEPARIN SODIUM 1300 UNIT(S)/HR: 5000 INJECTION INTRAVENOUS; SUBCUTANEOUS at 07:27

## 2023-01-10 RX ADMIN — LOSARTAN POTASSIUM 100 MILLIGRAM(S): 100 TABLET, FILM COATED ORAL at 05:48

## 2023-01-10 RX ADMIN — Medication 4: at 22:02

## 2023-01-10 RX ADMIN — Medication 25 MILLIGRAM(S): at 10:03

## 2023-01-10 RX ADMIN — Medication 0.1 MILLIGRAM(S): at 05:51

## 2023-01-10 RX ADMIN — Medication 40 MILLIGRAM(S): at 05:52

## 2023-01-10 RX ADMIN — Medication 40 MILLIEQUIVALENT(S): at 13:25

## 2023-01-10 RX ADMIN — Medication 10: at 18:01

## 2023-01-10 RX ADMIN — INSULIN GLARGINE 25 UNIT(S): 100 INJECTION, SOLUTION SUBCUTANEOUS at 08:53

## 2023-01-10 RX ADMIN — Medication 20 MILLIGRAM(S): at 05:47

## 2023-01-10 RX ADMIN — Medication 12: at 08:46

## 2023-01-10 RX ADMIN — BUDESONIDE AND FORMOTEROL FUMARATE DIHYDRATE 2 PUFF(S): 160; 4.5 AEROSOL RESPIRATORY (INHALATION) at 08:48

## 2023-01-10 NOTE — PROGRESS NOTE ADULT - SUBJECTIVE AND OBJECTIVE BOX
SUBJECTIVE  SUMMARY OF HOSPITALIZATION / HPI        PAST MEDICAL/SURGICAL HISTORY  PAST MEDICAL & SURGICAL HISTORY:  Type 2 diabetes mellitus      CAD (coronary artery disease)      Asthma      Essential hypertension          FAMILY HISTORY:      SOCIAL HISTORY:  Smoking:   EtOH Use:  Marital Status:  Occupation:  Recent Travel:  Advance Directives:    ALLERGIES  Allergies    No Known Allergies    Intolerances        HOME MEDICATIONS:      HOSPITAL MEDICATIONS:  MEDICATIONS  (STANDING):  amLODIPine   Tablet 10 milliGRAM(s) Oral daily  aspirin enteric coated 81 milliGRAM(s) Oral daily  atorvastatin 80 milliGRAM(s) Oral at bedtime  budesonide 160 MICROgram(s)/formoterol 4.5 MICROgram(s) Inhaler 2 Puff(s) Inhalation two times a day  cloNIDine 0.1 milliGRAM(s) Oral two times a day  coronavirus bivalent (EUA) Booster Vaccine (PFIZER) 0.3 milliLiter(s) IntraMuscular once  dextrose 5%. 1000 milliLiter(s) (100 mL/Hr) IV Continuous <Continuous>  dextrose 5%. 1000 milliLiter(s) (50 mL/Hr) IV Continuous <Continuous>  dextrose 50% Injectable 25 Gram(s) IV Push once  dextrose 50% Injectable 12.5 Gram(s) IV Push once  dextrose 50% Injectable 25 Gram(s) IV Push once  glucagon  Injectable 1 milliGRAM(s) IntraMuscular once  heparin  Infusion.  Unit(s)/Hr (12 mL/Hr) IV Continuous <Continuous>  insulin glargine Injectable (LANTUS) 25 Unit(s) SubCutaneous every morning  insulin lispro (ADMELOG) corrective regimen sliding scale   SubCutaneous Before meals and at bedtime  insulin lispro Injectable (ADMELOG) 16 Unit(s) SubCutaneous three times a day before meals  levalbuterol Inhalation 0.63 milliGRAM(s) Inhalation every 6 hours  losartan 100 milliGRAM(s) Oral daily  metoclopramide 10 milliGRAM(s) Oral at bedtime  montelukast 10 milliGRAM(s) Oral daily  oseltamivir 75 milliGRAM(s) Oral two times a day  potassium chloride    Tablet ER 40 milliEquivalent(s) Oral every 4 hours    MEDICATIONS  (PRN):  benzonatate 100 milliGRAM(s) Oral every 8 hours PRN Cough  dextrose Oral Gel 15 Gram(s) Oral once PRN Blood Glucose LESS THAN 70 milliGRAM(s)/deciliter  guaiFENesin Oral Liquid (Sugar-Free) 100 milliGRAM(s) Oral every 6 hours PRN Cough  heparin   Injectable 5500 Unit(s) IV Push every 6 hours PRN For aPTT less than 40  heparin   Injectable 2500 Unit(s) IV Push every 6 hours PRN For aPTT between 40 - 57      REVIEW OF SYSTEMS:  [ ] All other systems negative  [ ] Unable to assess ROS because ________    OBJECTIVE:  VITAL SIGNS  ICU Vital Signs Last 24 Hrs  T(C): 36.7 (10 Angel 2023 05:58), Max: 36.7 (10 Angel 2023 05:58)  T(F): 98.1 (10 Angel 2023 05:58), Max: 98.1 (10 Angel 2023 05:58)  HR: 90 (10 Angel 2023 05:58) (69 - 91)  BP: 129/88 (10 Angel 2023 05:58) (127/85 - 141/73)  BP(mean): --  ABP: --  ABP(mean): --  RR: 18 (10 Angel 2023 05:58) (16 - 18)  SpO2: 95% (10 Angel 2023 05:58) (95% - 98%)    O2 Parameters below as of 10 Angel 2023 05:58  Patient On (Oxygen Delivery Method): nasal cannula  O2 Flow (L/min): 2              PHYSICAL EXAM:          LAB DATA                        12.9   19.33 )-----------( 478      ( 10 Angel 2023 06:50 )             37.7     01-10    136  |  99  |  26<H>  ----------------------------<  345<H>  3.1<L>   |  21<L>  |  1.14    Ca    8.9      10 Angel 2023 06:50  Phos  4.2     01-10  Mg     2.00     01-10      PTT - ( 10 Angel 2023 06:50 )  PTT:55.6 sec                CAPILLARY BLOOD GLUCOSE      POCT Blood Glucose.: 429 mg/dL (10 Angel 2023 08:29)  POCT Blood Glucose.: 402 mg/dL (10 Angel 2023 08:28)  POCT Blood Glucose.: 274 mg/dL (09 Jan 2023 22:21)  POCT Blood Glucose.: 107 mg/dL (09 Jan 2023 17:25)  POCT Blood Glucose.: 244 mg/dL (09 Jan 2023 12:31)      ADDITIONAL TESTS & IMAGING  RADIOLOGY:      MICROBIOLOGY:         CARDIOLOGY DATA:  12 Lead ECG:   Ventricular Rate 110 BPM    Atrial Rate 117 BPM    QRS Duration 90 ms    Q-T Interval 290 ms    QTC Calculation(Bazett) 392 ms    R Axis 44 degrees    T Axis -9 degrees    Diagnosis Line Atrial fibrillation with rapid ventricular response  Nonspecific T wave abnormality  Abnormal ECG (01-05-23 @ 15:21)     SUBJECTIVE  SUMMARY OF HOSPITALIZATION / HPI   #[id number] (Guru Ramiro)  Mr. Carreon says that he feels much better this morning. He denies any issues breathing or ongoing cough/fever/chills. He is asking to leave today. He does endorse quitting smoking 12 days ago.     PAST MEDICAL/SURGICAL HISTORY  PAST MEDICAL & SURGICAL HISTORY:  Type 2 diabetes mellitus  CAD (coronary artery disease)  Asthma  Essential hypertension    FAMILY HISTORY:      SOCIAL HISTORY:  Smoking: recently quit smoking, 0.25-0.5x "many years", initially had declined smoking    ALLERGIES  Allergies    No Known Allergies    Intolerances        HOME MEDICATIONS:      HOSPITAL MEDICATIONS:  MEDICATIONS  (STANDING):  amLODIPine   Tablet 10 milliGRAM(s) Oral daily  aspirin enteric coated 81 milliGRAM(s) Oral daily  atorvastatin 80 milliGRAM(s) Oral at bedtime  budesonide 160 MICROgram(s)/formoterol 4.5 MICROgram(s) Inhaler 2 Puff(s) Inhalation two times a day  cloNIDine 0.1 milliGRAM(s) Oral two times a day  coronavirus bivalent (EUA) Booster Vaccine (PFIZER) 0.3 milliLiter(s) IntraMuscular once  dextrose 5%. 1000 milliLiter(s) (100 mL/Hr) IV Continuous <Continuous>  dextrose 5%. 1000 milliLiter(s) (50 mL/Hr) IV Continuous <Continuous>  dextrose 50% Injectable 25 Gram(s) IV Push once  dextrose 50% Injectable 12.5 Gram(s) IV Push once  dextrose 50% Injectable 25 Gram(s) IV Push once  glucagon  Injectable 1 milliGRAM(s) IntraMuscular once  heparin  Infusion.  Unit(s)/Hr (12 mL/Hr) IV Continuous <Continuous>  insulin glargine Injectable (LANTUS) 25 Unit(s) SubCutaneous every morning  insulin lispro (ADMELOG) corrective regimen sliding scale   SubCutaneous Before meals and at bedtime  insulin lispro Injectable (ADMELOG) 16 Unit(s) SubCutaneous three times a day before meals  levalbuterol Inhalation 0.63 milliGRAM(s) Inhalation every 6 hours  losartan 100 milliGRAM(s) Oral daily  metoclopramide 10 milliGRAM(s) Oral at bedtime  montelukast 10 milliGRAM(s) Oral daily  oseltamivir 75 milliGRAM(s) Oral two times a day  potassium chloride    Tablet ER 40 milliEquivalent(s) Oral every 4 hours    MEDICATIONS  (PRN):  benzonatate 100 milliGRAM(s) Oral every 8 hours PRN Cough  dextrose Oral Gel 15 Gram(s) Oral once PRN Blood Glucose LESS THAN 70 milliGRAM(s)/deciliter  guaiFENesin Oral Liquid (Sugar-Free) 100 milliGRAM(s) Oral every 6 hours PRN Cough  heparin   Injectable 5500 Unit(s) IV Push every 6 hours PRN For aPTT less than 40  heparin   Injectable 2500 Unit(s) IV Push every 6 hours PRN For aPTT between 40 - 57      REVIEW OF SYSTEMS:  [x] All other systems negative  [ ] Unable to assess ROS because ________    OBJECTIVE:  VITAL SIGNS  ICU Vital Signs Last 24 Hrs  T(C): 36.7 (10 Angel 2023 05:58), Max: 36.7 (10 Angel 2023 05:58)  T(F): 98.1 (10 Angel 2023 05:58), Max: 98.1 (10 Angel 2023 05:58)  HR: 90 (10 Angel 2023 05:58) (69 - 91)  BP: 129/88 (10 Angel 2023 05:58) (127/85 - 141/73)  BP(mean): --  ABP: --  ABP(mean): --  RR: 18 (10 Angel 2023 05:58) (16 - 18)  SpO2: 95% (10 Angel 2023 05:58) (95% - 98%)    O2 Parameters below as of 10 Angel 2023 05:58  Patient On (Oxygen Delivery Method): nasal cannula  O2 Flow (L/min): 2    PHYSICAL EXAM:  GEN: Awake, AOx3, NAD.  HEENT: NCAT  ---EYES: no scleral icterus, EOMI, PERRLA  CARDIO: Continues with irregularly irregular pulse  RESP: Scattered wheeze superiorly & inferiorly  ABD: Soft, NTND.   MSK: No obvious deformity or ROM deficit.   SKIN: Warm, dry.   NEURO: Moves all four extremities spontaneously  PSYCH: Appropriate mood & affect.     LAB DATA                        12.9   19.33 )-----------( 478      ( 10 Angel 2023 06:50 )             37.7     01-10    136  |  99  |  26<H>  ----------------------------<  345<H>  3.1<L>   |  21<L>  |  1.14    Ca    8.9      10 Angel 2023 06:50  Phos  4.2     01-10  Mg     2.00     01-10      PTT - ( 10 Angel 2023 06:50 )  PTT:55.6 sec    CAPILLARY BLOOD GLUCOSE      POCT Blood Glucose.: 429 mg/dL (10 Angel 2023 08:29)  POCT Blood Glucose.: 402 mg/dL (10 Angel 2023 08:28)  POCT Blood Glucose.: 274 mg/dL (09 Jan 2023 22:21)  POCT Blood Glucose.: 107 mg/dL (09 Jan 2023 17:25)  POCT Blood Glucose.: 244 mg/dL (09 Jan 2023 12:31)      ADDITIONAL TESTS & IMAGING  RADIOLOGY: No interval imaging    MICROBIOLOGY: No interval micro data    CARDIOLOGY DATA:  12 Lead ECG:   Ventricular Rate 110 BPM  Atrial Rate 117 BPM  QRS Duration 90 ms  Q-T Interval 290 ms  QTC Calculation(Bazett) 392 ms  R Axis 44 degrees  T Axis -9 degrees  Diagnosis Line Atrial fibrillation with rapid ventricular response  Nonspecific T wave abnormality  Abnormal ECG (01-05-23 @ 15:21)

## 2023-01-10 NOTE — PROGRESS NOTE ADULT - SUBJECTIVE AND OBJECTIVE BOX
PROGRESS NOTE:     Patient is a 67y old  Male who presents with a chief complaint of sob (10 Angel 2023 08:48)      SUBJECTIVE / OVERNIGHT EVENTS:    ADDITIONAL REVIEW OF SYSTEMS:    MEDICATIONS  (STANDING):  amLODIPine   Tablet 10 milliGRAM(s) Oral daily  aspirin enteric coated 81 milliGRAM(s) Oral daily  atorvastatin 80 milliGRAM(s) Oral at bedtime  budesonide 160 MICROgram(s)/formoterol 4.5 MICROgram(s) Inhaler 2 Puff(s) Inhalation two times a day  cloNIDine 0.1 milliGRAM(s) Oral two times a day  coronavirus bivalent (EUA) Booster Vaccine (PFIZER) 0.3 milliLiter(s) IntraMuscular once  dextrose 5%. 1000 milliLiter(s) (100 mL/Hr) IV Continuous <Continuous>  dextrose 5%. 1000 milliLiter(s) (50 mL/Hr) IV Continuous <Continuous>  dextrose 50% Injectable 25 Gram(s) IV Push once  dextrose 50% Injectable 12.5 Gram(s) IV Push once  dextrose 50% Injectable 25 Gram(s) IV Push once  glucagon  Injectable 1 milliGRAM(s) IntraMuscular once  heparin  Infusion.  Unit(s)/Hr (12 mL/Hr) IV Continuous <Continuous>  insulin glargine Injectable (LANTUS) 25 Unit(s) SubCutaneous every morning  insulin lispro (ADMELOG) corrective regimen sliding scale   SubCutaneous Before meals and at bedtime  insulin lispro Injectable (ADMELOG) 16 Unit(s) SubCutaneous three times a day before meals  levalbuterol Inhalation 0.63 milliGRAM(s) Inhalation every 6 hours  losartan 100 milliGRAM(s) Oral daily  metoclopramide 10 milliGRAM(s) Oral at bedtime  metoprolol tartrate 25 milliGRAM(s) Oral two times a day  montelukast 10 milliGRAM(s) Oral daily  oseltamivir 75 milliGRAM(s) Oral two times a day  potassium chloride    Tablet ER 40 milliEquivalent(s) Oral every 4 hours    MEDICATIONS  (PRN):  benzonatate 100 milliGRAM(s) Oral every 8 hours PRN Cough  dextrose Oral Gel 15 Gram(s) Oral once PRN Blood Glucose LESS THAN 70 milliGRAM(s)/deciliter  guaiFENesin Oral Liquid (Sugar-Free) 100 milliGRAM(s) Oral every 6 hours PRN Cough  heparin   Injectable 5500 Unit(s) IV Push every 6 hours PRN For aPTT less than 40  heparin   Injectable 2500 Unit(s) IV Push every 6 hours PRN For aPTT between 40 - 57      CAPILLARY BLOOD GLUCOSE      POCT Blood Glucose.: 429 mg/dL (10 Angel 2023 08:29)  POCT Blood Glucose.: 402 mg/dL (10 Angel 2023 08:28)  POCT Blood Glucose.: 274 mg/dL (09 Jan 2023 22:21)  POCT Blood Glucose.: 107 mg/dL (09 Jan 2023 17:25)  POCT Blood Glucose.: 244 mg/dL (09 Jan 2023 12:31)    I&O's Summary      PHYSICAL EXAM:  Vital Signs Last 24 Hrs  T(C): 36.7 (10 Angel 2023 05:58), Max: 36.7 (10 Angel 2023 05:58)  T(F): 98.1 (10 Angel 2023 05:58), Max: 98.1 (10 Angel 2023 05:58)  HR: 90 (10 Angel 2023 05:58) (69 - 91)  BP: 129/88 (10 Angel 2023 05:58) (127/85 - 141/73)  BP(mean): --  RR: 18 (10 Angel 2023 05:58) (16 - 18)  SpO2: 95% (10 Angel 2023 05:58) (95% - 98%)    Parameters below as of 10 Angel 2023 05:58  Patient On (Oxygen Delivery Method): nasal cannula  O2 Flow (L/min): 2      CONSTITUTIONAL: NAD, well-developed  RESPIRATORY: Normal respiratory effort; lungs are clear to auscultation bilaterally  CARDIOVASCULAR: Regular rate and rhythm, normal S1 and S2, no murmur/rub/gallop; No lower extremity edema; Peripheral pulses are 2+ bilaterally  ABDOMEN: Nontender to palpation, normoactive bowel sounds, no rebound/guarding; No hepatosplenomegaly  MUSCLOSKELETAL: no clubbing or cyanosis of digits; no joint swelling or tenderness to palpation  SKIN: no rash, erythema, lesion  PSYCH: A+O to person, place, and time; affect appropriate    LABS:                        12.9   19.33 )-----------( 478      ( 10 Angel 2023 06:50 )             37.7     01-10    136  |  99  |  26<H>  ----------------------------<  345<H>  3.1<L>   |  21<L>  |  1.14    Ca    8.9      10 Angel 2023 06:50  Phos  4.2     01-10  Mg     2.00     01-10      PTT - ( 10 Angel 2023 06:50 )  PTT:55.6 sec            RADIOLOGY & ADDITIONAL TESTS:  Results Reviewed:   Imaging Personally Reviewed:  Electrocardiogram Personally Reviewed:    COORDINATION OF CARE:  Care Discussed with Consultants/Other Providers [Y/N]:  Prior or Outpatient Records Reviewed [Y/N]:   PROGRESS NOTE:     Patient is a 67y old  Male who presents with a chief complaint of sob (10 Angel 2023 08:48)      SUBJECTIVE / OVERNIGHT EVENTS: no acute event overnight. Reports breathing is better. No cough. Wants to go home. Denies chest pain, dizziness.     ADDITIONAL REVIEW OF SYSTEMS:    MEDICATIONS  (STANDING):  amLODIPine   Tablet 10 milliGRAM(s) Oral daily  aspirin enteric coated 81 milliGRAM(s) Oral daily  atorvastatin 80 milliGRAM(s) Oral at bedtime  budesonide 160 MICROgram(s)/formoterol 4.5 MICROgram(s) Inhaler 2 Puff(s) Inhalation two times a day  cloNIDine 0.1 milliGRAM(s) Oral two times a day  coronavirus bivalent (EUA) Booster Vaccine (PFIZER) 0.3 milliLiter(s) IntraMuscular once  dextrose 5%. 1000 milliLiter(s) (100 mL/Hr) IV Continuous <Continuous>  dextrose 5%. 1000 milliLiter(s) (50 mL/Hr) IV Continuous <Continuous>  dextrose 50% Injectable 25 Gram(s) IV Push once  dextrose 50% Injectable 12.5 Gram(s) IV Push once  dextrose 50% Injectable 25 Gram(s) IV Push once  glucagon  Injectable 1 milliGRAM(s) IntraMuscular once  heparin  Infusion.  Unit(s)/Hr (12 mL/Hr) IV Continuous <Continuous>  insulin glargine Injectable (LANTUS) 25 Unit(s) SubCutaneous every morning  insulin lispro (ADMELOG) corrective regimen sliding scale   SubCutaneous Before meals and at bedtime  insulin lispro Injectable (ADMELOG) 16 Unit(s) SubCutaneous three times a day before meals  levalbuterol Inhalation 0.63 milliGRAM(s) Inhalation every 6 hours  losartan 100 milliGRAM(s) Oral daily  metoclopramide 10 milliGRAM(s) Oral at bedtime  metoprolol tartrate 25 milliGRAM(s) Oral two times a day  montelukast 10 milliGRAM(s) Oral daily  oseltamivir 75 milliGRAM(s) Oral two times a day  potassium chloride    Tablet ER 40 milliEquivalent(s) Oral every 4 hours    MEDICATIONS  (PRN):  benzonatate 100 milliGRAM(s) Oral every 8 hours PRN Cough  dextrose Oral Gel 15 Gram(s) Oral once PRN Blood Glucose LESS THAN 70 milliGRAM(s)/deciliter  guaiFENesin Oral Liquid (Sugar-Free) 100 milliGRAM(s) Oral every 6 hours PRN Cough  heparin   Injectable 5500 Unit(s) IV Push every 6 hours PRN For aPTT less than 40  heparin   Injectable 2500 Unit(s) IV Push every 6 hours PRN For aPTT between 40 - 57      CAPILLARY BLOOD GLUCOSE      POCT Blood Glucose.: 429 mg/dL (10 Angel 2023 08:29)  POCT Blood Glucose.: 402 mg/dL (10 Angel 2023 08:28)  POCT Blood Glucose.: 274 mg/dL (09 Jan 2023 22:21)  POCT Blood Glucose.: 107 mg/dL (09 Jan 2023 17:25)  POCT Blood Glucose.: 244 mg/dL (09 Jan 2023 12:31)    I&O's Summary      PHYSICAL EXAM:  Vital Signs Last 24 Hrs  T(C): 36.7 (10 Angel 2023 05:58), Max: 36.7 (10 Angel 2023 05:58)  T(F): 98.1 (10 Angel 2023 05:58), Max: 98.1 (10 Angel 2023 05:58)  HR: 90 (10 Angel 2023 05:58) (69 - 91)  BP: 129/88 (10 Angel 2023 05:58) (127/85 - 141/73)  BP(mean): --  RR: 18 (10 Angel 2023 05:58) (16 - 18)  SpO2: 95% (10 Angel 2023 05:58) (95% - 98%)    Parameters below as of 10 Angel 2023 05:58  Patient On (Oxygen Delivery Method): nasal cannula  O2 Flow (L/min): 2      CONSTITUTIONAL: NAD, well-developed  RESPIRATORY: Normal respiratory effort; lungs are clear to auscultation bilaterally  CARDIOVASCULAR: irregular rate and rhythm, normal S1 and S2, no murmur/rub/gallop; No lower extremity edema  ABDOMEN: Nontender to palpation, normoactive bowel sounds, no rebound/guarding  MUSCLOSKELETAL: no clubbing or cyanosis of digits; no joint swelling or tenderness to palpation  SKIN: no rash, erythema, lesion  PSYCH: A+O to person, place, and time; affect appropriate    LABS:                        12.9   19.33 )-----------( 478      ( 10 Angel 2023 06:50 )             37.7     01-10    136  |  99  |  26<H>  ----------------------------<  345<H>  3.1<L>   |  21<L>  |  1.14    Ca    8.9      10 Angel 2023 06:50  Phos  4.2     01-10  Mg     2.00     01-10      PTT - ( 10 Angel 2023 06:50 )  PTT:55.6 sec            RADIOLOGY & ADDITIONAL TESTS:  Results Reviewed:   Imaging Personally Reviewed:  Electrocardiogram Personally Reviewed:    COORDINATION OF CARE:  Care Discussed with Consultants/Other Providers [Y/N]:  Prior or Outpatient Records Reviewed [Y/N]:

## 2023-01-10 NOTE — PROGRESS NOTE ADULT - ASSESSMENT
*** INCOMPLETE ***  SYNTHESIS  #Asthma Exacerbation 2/2 Influenza A  Mr. Carreon is a 65F with h/o Asthma, HTN, CAD (s/p 1 stent, dates unknown), DM (on Januvia, Glipizide) who presents with shortness of breath, wheezing 2/2 likely asthma exacerbation ico Influenza A+. His wheeze has improved.     #Hyperglycemia #T2DM  Pt with persistently elevated BGLs likely as a consequence of steroid-therapy & underlying insulin resistance. Will have to monitor these numbers as Basal-Bolus regimen increased as we decrease his steroid load. Will trial lower therapeutic dose of steroid and assess respiratory tolerability given his persistent, significant hyperglycemia.    SUMMARY OF RECOMMENDATIONS  - Recommend CONTINUE Prednisone 40mg PO QD (will continue to titrate to clinical response)  - Consider spacing out NEYMAR/LABA to PRN given improvement in wheeze  - If pt condition worsens, would consider Magnesium 2g IV for acute asthma exacerbation  - If pt condition worsens, could also consider NIPPV if increased WOB.    Calderon Quijano MD PGY-2   *** INCOMPLETE ***  SYNTHESIS  #COPD Exacerbation 2/2 Influenza A+  Mr. Carreon is a 65F with h/o Asthma vs COPD (pt now endorsing long-term smoking history), HTN, CAD (s/p 1 stent, dates unknown), DM (on Januvia, Glipizide) who presents with shortness of breath, wheezing 2/2 likely COPD exacerbation ico Influenza A+. His wheeze has improved.     #Hyperglycemia #T2DM  Pt with persistently elevated BGLs likely as a consequence of steroid-therapy & underlying insulin resistance. Will have to monitor these numbers as Basal-Bolus regimen increased as we decrease his steroid load. Will trial lower therapeutic dose of steroid and assess respiratory tolerability given his persistent, significant hyperglycemia.     SUMMARY OF RECOMMENDATIONS  - Recommend CONTINUE Prednisone 40mg PO QD (Total Steroids 01/06-)  - Recommend CONTINUE Budesonide 140ug/Formoterol 4.5ug 2 puffs INH BID (ICS/LABA)  - Recommend START Tiotropium 17ug 2 puffs INH QD (LAMA)  - Consider Levalbuterol (NEYMAR) Q6H PRN today to assess spaced responsiveness  - Please include need for annual low-dose CT, Pneumonia Vaccine, Pulm Follow-Up/PFTs in discharge paperwork as pt likely with smoking-related COPD    Calderon Quijano MD PGY-2  Case D/W Dr. Birch   SYNTHESIS  #COPD Exacerbation 2/2 Influenza A+  Mr. Carreon is a 65F with h/o Asthma vs COPD (pt now endorsing long-term smoking history), HTN, CAD (s/p 1 stent, dates unknown), DM (on Januvia, Glipizide) who presents with shortness of breath, wheezing 2/2 likely COPD exacerbation ico Influenza A+. His wheeze has improved.     #Hyperglycemia #T2DM  Pt with persistently elevated BGLs likely as a consequence of steroid-therapy & underlying insulin resistance. Will have to monitor these numbers as Basal-Bolus regimen increased as we decrease his steroid load. Will trial lower therapeutic dose of steroid and assess respiratory tolerability given his persistent, significant hyperglycemia.     SUMMARY OF RECOMMENDATIONS  - Recommend CONTINUE Prednisone 40mg PO QD (Total Steroids 01/06-)  --- Regarding taper: consider Pred 40 PO QD -1/11, then:  --- Pred 30mg PO QD 1/12-14, then:  --- Pred 20mg PO QD 1/15-17, then:  --- Pred 10mg PO QD 1/18-20, then stop   --- Will need close OP pulm follow-up, please let us know prior to discharge so we can e-mail for an appointment.  - Recommend CONTINUE Budesonide 140ug/Formoterol 4.5ug 2 puffs INH BID (ICS/LABA)  - Recommend START Tiotropium 17ug 2 puffs INH QD (LAMA)  - Consider Levalbuterol (NEYMAR) Q6H ~PRN~ today to assess spaced responsiveness  - Please include need for annual low-dose CT, Pneumonia Vaccine, Pulm Follow-Up/PFTs in patient's discharge paperwork    Calderon Quijano MD PGY-2  Case D/W Dr. Birch   SYNTHESIS  #COPD Exacerbation 2/2 Influenza A+  Mr. Carreon is a 65F with h/o Asthma vs COPD (pt now endorsing long-term smoking history), HTN, CAD (s/p 1 stent, dates unknown), DM (on Januvia, Glipizide) who presents with shortness of breath, wheezing 2/2 likely COPD exacerbation ico Influenza A+. His wheeze has improved.     #Hyperglycemia #T2DM  Pt with persistently elevated BGLs likely as a consequence of steroid-therapy & underlying insulin resistance. Will have to monitor these numbers as Basal-Bolus regimen increased as we decrease his steroid load. Will trial lower therapeutic dose of steroid and assess respiratory tolerability given his persistent, significant hyperglycemia.     SUMMARY OF RECOMMENDATIONS  - Recommend DECREASE Prednisone PO QD (Total Steroids 01/06-)  --- Regarding taper: consider Pred 40 PO QD -1/11, then:  --- Pred 30mg PO QD 1/12-14, then:  --- Pred 20mg PO QD 1/15-17, then:  --- Pred 10mg PO QD 1/18-20, then stop  --- Will need close OP pulm follow-up, please let us know prior to discharge so we can e-mail for an appointment.  - Recommend CONTINUE Budesonide 140ug/Formoterol 4.5ug 2 puffs INH BID (ICS/LABA)  - Recommend CONTINUE Tiotropium 17ug 2 puffs INH QD (LAMA)  - Consider Levalbuterol (NEYMAR) Q6H ~PRN~   - Please include need for annual low-dose CT, Pneumonia Vaccine, Pulm Follow-Up/PFTs in patient's discharge paperwork    Calderon Quijano MD PGY-2  Case D/W Dr. Birch   SYNTHESIS  #COPD Exacerbation 2/2 Influenza A+  Mr. Carreon is a 65F with h/o Asthma vs COPD (pt now endorsing long-term smoking history), HTN, CAD (s/p 1 stent, dates unknown), DM (on Januvia, Glipizide) who presents with shortness of breath, wheezing 2/2 likely COPD exacerbation ico Influenza A+. His wheeze has improved.     #Hyperglycemia #T2DM  Pt with persistently elevated BGLs likely as a consequence of steroid-therapy & underlying insulin resistance. Will have to monitor these numbers as Basal-Bolus regimen increased as we decrease his steroid load. Will trial lower therapeutic dose of steroid and assess respiratory tolerability given his persistent, significant hyperglycemia.     SUMMARY OF RECOMMENDATIONS  - Recommend CONTINUE Prednisone 40mg PO QD (Total Steroids 01/06-)  --- Regarding taper: consider Pred 40 PO QD -1/11, then:  --- Pred 30mg PO QD 1/12-14, then:  --- Pred 20mg PO QD 1/15-17, then:  --- Pred 10mg PO QD 1/18-20, then stop  --- Will need close OP pulm follow-up, please let us know prior to discharge so we can e-mail for an appointment.  - Recommend CONTINUE Budesonide 140ug/Formoterol 4.5ug 2 puffs INH BID (ICS/LABA)  - Recommend CONTINUE Tiotropium 17ug 2 puffs INH QD (LAMA)  - Consider Levalbuterol (NEYMAR) Q6H ~PRN~   - Please include need for annual low-dose CT, Pneumonia Vaccine, Pulm Follow-Up/PFTs in patient's discharge paperwork    Calderon Quijano MD PGY-2  Case D/W Dr. Birch

## 2023-01-10 NOTE — PROGRESS NOTE ADULT - PROBLEM SELECTOR PLAN 1
2/2 sepsis and hypoxia, reverted back into afib briefly overnight  - Cont Heparin drip, can transition to DOAC pending insurance coverage and TTE   - Cardiology recs appreciate: start lopressor 25mg BID  [ ] TTE   [ ] EP recs appreciated 2/2 sepsis and hypoxia, reverted back into afib briefly overnight  - Cont Heparin drip, can transition to DOAC pending insurance coverage and TTE   - Cardiology recs appreciate: start lopressor 25mg BID  [ ] TTE result pending  [ ] EP recs appreciated

## 2023-01-10 NOTE — PROGRESS NOTE ADULT - SUBJECTIVE AND OBJECTIVE BOX
Cardiovascular Disease Progress Note  Date of Service: 01-10-23 @ 08:03    Overnight events: No acute events overnight.    Patient denies chest pain or SOB.   Otherwise review of systems negative    Objective Findings:  T(C): 36.7 (01-10-23 @ 05:58), Max: 36.7 (01-10-23 @ 05:58)  HR: 90 (01-10-23 @ 05:58) (69 - 91)  BP: 129/88 (01-10-23 @ 05:58) (127/85 - 141/73)  RR: 18 (01-10-23 @ 05:58) (16 - 18)  SpO2: 95% (01-10-23 @ 05:58) (95% - 98%)  Wt(kg): --  Daily     Daily       Physical Exam:  Gen: NAD; Patient resting comfortably  HEENT: EOMI, Normocephalic/ atraumatic  CV: RRR, normal S1 + S2, no m/r/g  Lungs:  Normal respiratory effort; clear to auscultation bilaterally  Abd: soft, non-tender; bowel sounds present  Ext: No edema; warm and well perfused    Telemetry: Sinus    Laboratory Data:                        12.9   19.33 )-----------( 478      ( 10 Angel 2023 06:50 )             37.7     01-09    135  |  99  |  22  ----------------------------<  284<H>  4.7   |  22  |  1.02    Ca    8.6      09 Jan 2023 07:05  Phos  3.4     01-09  Mg     2.00     01-09      PTT - ( 09 Jan 2023 22:54 )  PTT:65.1 sec          Inpatient Medications:  MEDICATIONS  (STANDING):  amLODIPine   Tablet 10 milliGRAM(s) Oral daily  aspirin enteric coated 81 milliGRAM(s) Oral daily  atorvastatin 80 milliGRAM(s) Oral at bedtime  budesonide 160 MICROgram(s)/formoterol 4.5 MICROgram(s) Inhaler 2 Puff(s) Inhalation two times a day  cloNIDine 0.1 milliGRAM(s) Oral two times a day  coronavirus bivalent (EUA) Booster Vaccine (PFIZER) 0.3 milliLiter(s) IntraMuscular once  dextrose 5%. 1000 milliLiter(s) (100 mL/Hr) IV Continuous <Continuous>  dextrose 5%. 1000 milliLiter(s) (50 mL/Hr) IV Continuous <Continuous>  dextrose 50% Injectable 25 Gram(s) IV Push once  dextrose 50% Injectable 12.5 Gram(s) IV Push once  dextrose 50% Injectable 25 Gram(s) IV Push once  furosemide   Injectable 20 milliGRAM(s) IV Push two times a day  glucagon  Injectable 1 milliGRAM(s) IntraMuscular once  heparin  Infusion.  Unit(s)/Hr (12 mL/Hr) IV Continuous <Continuous>  insulin glargine Injectable (LANTUS) 25 Unit(s) SubCutaneous every morning  insulin lispro (ADMELOG) corrective regimen sliding scale   SubCutaneous Before meals and at bedtime  insulin lispro Injectable (ADMELOG) 16 Unit(s) SubCutaneous three times a day before meals  levalbuterol Inhalation 0.63 milliGRAM(s) Inhalation every 6 hours  losartan 100 milliGRAM(s) Oral daily  methylPREDNISolone sodium succinate Injectable 40 milliGRAM(s) IV Push daily  metoclopramide 10 milliGRAM(s) Oral at bedtime  montelukast 10 milliGRAM(s) Oral daily  oseltamivir 75 milliGRAM(s) Oral two times a day      Assessment: 67 year old man with CAD s/p PCI circa 2014 in [facility], HTN, Asthma, and DM who presents with sepsis secondary to influenza and a-fib with RVR..    Plan of Care:    #Afib RVR  - In setting of hypoxia and severe sepsis on admission.  - 3.5 second pause noted (not a conversion pause).  - EP input noted- they did not address his pauses.  - Start Lopressor 25 mg PO BID and continue to monitor on telemetry.   - I agree with full dose anticoagulation to prevent cardiac embolism.  - Awaiting echo.   - No objection to Xarelto or Eliquis if there is no mitral valve disease on echo.       #HTN urgency  - BP is now much improved.     #Hypoxic respiratory failure  - 2/2 influenza  - Wheezes improved.  - Transition to Lasix 40 mg PO daily.       #CAD  - S/p PCI in 2014   - ASA 81 and a high intensity statin.         Over 25 minutes spent on total encounter; more than 50% of the visit was spent counseling and/or coordinating care by the attending physician.      Marc Louis MD [facility]  Cardiovascular Disease  [phone]

## 2023-01-10 NOTE — PROGRESS NOTE ADULT - PROBLEM SELECTOR PLAN 6
Home meds: Glyburide and Metformin, Reported A1c ~8.5   - C/b hyperglycemia, likely 2/2 steroid use   - increase Lantus to 25 units QD   - Increase Lispro to 16 units w/ meals   - Mod SSI   [ ] DECREASE Insulin once steroids discontinued   -Son reports gastroparesis, c/w Reglan Home meds: Glyburide and Metformin, Reported A1c ~8.5   - C/b hyperglycemia, likely 2/2 steroid use   - increase Lantus to 25 units to 30 unit QD   - c/w Lispro to 16 units w/ meals   - Mod SSI   [ ] DECREASE Insulin once steroids discontinued   -Son reports gastroparesis, c/w Reglan

## 2023-01-10 NOTE — PROVIDER CONTACT NOTE (OTHER) - BACKGROUND
PMH asthma, HTN, CAD, DM2, new afib. Pt on PO steroids. Pt uncontrolled DM2 PMH asthma, HTN, CAD, DM2, new afib. Pt on PO steroids. Pt uncontrolled DM2.

## 2023-01-10 NOTE — PROGRESS NOTE ADULT - ASSESSMENT
67 year old man with CAD s/p PCI circa 2014 in [facility], HTN, Asthma, and DM who presents with sepsis secondary to influenza and a-fib with RVR for which EP is consulted.   EP consulted for new diagnosis of AF with RVR. Son at bedside to corroborate history. Pt states he is feeling better since admission with regards to dyspnea and and respiratory symptoms, has been ambulating to bathroom without significant symptoms. telemetry shows Afib 110-120s with spikes to 140-150 with exertion. Hemodynamically stable and asymptomatic during episodes, denies palpitations and chest pain.     Recommendations  - Continue monitoring on telemetry  - Will followup formal TTE  - Agree with Metoprolol tartrate 25 mg bid; pauses all < 4 sec and not concerning at this time, not a contraindication to continuation/uptitration of BB    - [id number]; would initiate Eliquis 5 mg bid   - Would arrange EP followup with discussion of ablation eventually as an outpatient   - As always, K > 4 Mg > 2    *Note not final until signed by attending

## 2023-01-10 NOTE — PROGRESS NOTE ADULT - SUBJECTIVE AND OBJECTIVE BOX
Patient seen and examined at bedside.    Overnight Events: No acute events overnight. Denies chest pain or SOB. Telemetry with Afib pause 2.71 sec ~7am  and PVCs,       REVIEW OF SYSTEMS:  Constitutional:     [ x] negative [ ] fevers [ ] chills [ ] weight loss [ ] weight gain  HEENT:                  [ x] negative [ ] dry eyes [ ] eye irritation [ ] postnasal drip [ ] nasal congestion  CV:                         [x ] negative  [ ] chest pain [ ] orthopnea [ ] palpitations [ ] murmur  Resp:                     [ x] negative [ ] cough [ ] shortness of breath [ ] dyspnea [ ] wheezing [ ] sputum [ ]hemoptysis  GI:                          [ x] negative [ ] nausea [ ] vomiting [ ] diarrhea [ ] constipation [ ] abd pain [ ] dysphagia   :                        [ x] negative [ ] dysuria [ ] nocturia [ ] hematuria [ ] increased urinary frequency  Musculoskeletal: [ x] negative [ ] back pain [ ] myalgias [ ] arthralgias [ ] fracture  Skin:                       [ x] negative [ ] rash [ ] itch  Neurological:        [ x] negative [ ] headache [ ] dizziness [ ] syncope [ ] weakness [ ] numbness  Psychiatric:           [ x] negative [ ] anxiety [ ] depression  Endocrine:            [ x] negative [ ] diabetes [ ] thyroid problem  Heme/Lymph:      [ x] negative [ ] anemia [ ] bleeding problem  Allergic/Immune: [x ] negative [ ] itchy eyes [ ] nasal discharge [ ] hives [ ] angioedema    [x ] All other systems negative  [ ] Unable to assess ROS due to    Current Meds:  amLODIPine   Tablet 10 milliGRAM(s) Oral daily  aspirin enteric coated 81 milliGRAM(s) Oral daily  atorvastatin 80 milliGRAM(s) Oral at bedtime  benzonatate 100 milliGRAM(s) Oral every 8 hours PRN  budesonide 160 MICROgram(s)/formoterol 4.5 MICROgram(s) Inhaler 2 Puff(s) Inhalation two times a day  cloNIDine 0.1 milliGRAM(s) Oral two times a day  coronavirus bivalent (EUA) Booster Vaccine (PFIZER) 0.3 milliLiter(s) IntraMuscular once  dextrose 5%. 1000 milliLiter(s) IV Continuous <Continuous>  dextrose 5%. 1000 milliLiter(s) IV Continuous <Continuous>  dextrose 50% Injectable 25 Gram(s) IV Push once  dextrose 50% Injectable 12.5 Gram(s) IV Push once  dextrose 50% Injectable 25 Gram(s) IV Push once  dextrose Oral Gel 15 Gram(s) Oral once PRN  glucagon  Injectable 1 milliGRAM(s) IntraMuscular once  guaiFENesin Oral Liquid (Sugar-Free) 100 milliGRAM(s) Oral every 6 hours PRN  heparin   Injectable 5500 Unit(s) IV Push every 6 hours PRN  heparin   Injectable 2500 Unit(s) IV Push every 6 hours PRN  heparin  Infusion.  Unit(s)/Hr IV Continuous <Continuous>  insulin glargine Injectable (LANTUS) 25 Unit(s) SubCutaneous every morning  insulin lispro (ADMELOG) corrective regimen sliding scale   SubCutaneous Before meals and at bedtime  insulin lispro Injectable (ADMELOG) 16 Unit(s) SubCutaneous three times a day before meals  levalbuterol Inhalation 0.63 milliGRAM(s) Inhalation every 6 hours  losartan 100 milliGRAM(s) Oral daily  metoclopramide 10 milliGRAM(s) Oral at bedtime  metoprolol tartrate 25 milliGRAM(s) Oral two times a day  montelukast 10 milliGRAM(s) Oral daily  oseltamivir 75 milliGRAM(s) Oral two times a day  potassium chloride    Tablet ER 40 milliEquivalent(s) Oral every 4 hours      PAST MEDICAL & SURGICAL HISTORY:  Type 2 diabetes mellitus      CAD (coronary artery disease)      Asthma      Essential hypertension          Vitals:  T(F): 98.1 (01-10), Max: 98.1 (01-10)  HR: 96 (01-10) (69 - 96)  BP: 134/67 (01-10) (127/85 - 141/73)  RR: 18 (01-10)  SpO2: 95% (01-10)  I&O's Summary      Physical Exam:  Appearance: No acute distress; well appearing  Eyes: PERRL, EOMI, pink conjunctiva  HENT: Normal oral mucosa  Cardiovascular: RRR, S1, S2, no murmurs, rubs, or gallops; no edema; no JVD  Respiratory: Clear to auscultation bilaterally  Gastrointestinal: soft, non-tender, non-distended with normal bowel sounds  Musculoskeletal: No clubbing; no joint deformity   Neurologic: Non-focal  Lymphatic: No lymphadenopathy  Psychiatry: AAOx3, mood & affect appropriate  Skin: No rashes, ecchymoses, or cyanosis                          12.9   19.33 )-----------( 478      ( 10 Angel 2023 06:50 )             37.7     01-10    136  |  99  |  26<H>  ----------------------------<  345<H>  3.1<L>   |  21<L>  |  1.14    Ca    8.9      10 Angel 2023 06:50  Phos  4.2     01-10  Mg     2.00     01-10      PTT - ( 10 Angel 2023 06:50 )  PTT:55.6 sec      Serum Pro-Brain Natriuretic Peptide: 946 pg/mL (01-06 @ 12:00)

## 2023-01-11 LAB
ANION GAP SERPL CALC-SCNC: 11 MMOL/L — SIGNIFICANT CHANGE UP (ref 7–14)
BUN SERPL-MCNC: 27 MG/DL — HIGH (ref 7–23)
CALCIUM SERPL-MCNC: 8.2 MG/DL — LOW (ref 8.4–10.5)
CHLORIDE SERPL-SCNC: 104 MMOL/L — SIGNIFICANT CHANGE UP (ref 98–107)
CO2 SERPL-SCNC: 23 MMOL/L — SIGNIFICANT CHANGE UP (ref 22–31)
CREAT SERPL-MCNC: 1.18 MG/DL — SIGNIFICANT CHANGE UP (ref 0.5–1.3)
EGFR: 67 ML/MIN/1.73M2 — SIGNIFICANT CHANGE UP
GLUCOSE BLDC GLUCOMTR-MCNC: 196 MG/DL — HIGH (ref 70–99)
GLUCOSE BLDC GLUCOMTR-MCNC: 266 MG/DL — HIGH (ref 70–99)
GLUCOSE BLDC GLUCOMTR-MCNC: 399 MG/DL — HIGH (ref 70–99)
GLUCOSE BLDC GLUCOMTR-MCNC: 411 MG/DL — HIGH (ref 70–99)
GLUCOSE BLDC GLUCOMTR-MCNC: 416 MG/DL — HIGH (ref 70–99)
GLUCOSE BLDC GLUCOMTR-MCNC: 434 MG/DL — HIGH (ref 70–99)
GLUCOSE BLDC GLUCOMTR-MCNC: 457 MG/DL — CRITICAL HIGH (ref 70–99)
GLUCOSE BLDC GLUCOMTR-MCNC: 473 MG/DL — CRITICAL HIGH (ref 70–99)
GLUCOSE SERPL-MCNC: 186 MG/DL — HIGH (ref 70–99)
HCT VFR BLD CALC: 36.2 % — LOW (ref 39–50)
HGB BLD-MCNC: 12.2 G/DL — LOW (ref 13–17)
MAGNESIUM SERPL-MCNC: 2 MG/DL — SIGNIFICANT CHANGE UP (ref 1.6–2.6)
MCHC RBC-ENTMCNC: 28.8 PG — SIGNIFICANT CHANGE UP (ref 27–34)
MCHC RBC-ENTMCNC: 33.7 GM/DL — SIGNIFICANT CHANGE UP (ref 32–36)
MCV RBC AUTO: 85.6 FL — SIGNIFICANT CHANGE UP (ref 80–100)
NRBC # BLD: 0 /100 WBCS — SIGNIFICANT CHANGE UP (ref 0–0)
NRBC # FLD: 0.03 K/UL — HIGH (ref 0–0)
PHOSPHATE SERPL-MCNC: 4 MG/DL — SIGNIFICANT CHANGE UP (ref 2.5–4.5)
PLATELET # BLD AUTO: 515 K/UL — HIGH (ref 150–400)
POTASSIUM SERPL-MCNC: 3.5 MMOL/L — SIGNIFICANT CHANGE UP (ref 3.5–5.3)
POTASSIUM SERPL-SCNC: 3.5 MMOL/L — SIGNIFICANT CHANGE UP (ref 3.5–5.3)
RBC # BLD: 4.23 M/UL — SIGNIFICANT CHANGE UP (ref 4.2–5.8)
RBC # FLD: 15.9 % — HIGH (ref 10.3–14.5)
SODIUM SERPL-SCNC: 138 MMOL/L — SIGNIFICANT CHANGE UP (ref 135–145)
WBC # BLD: 21.36 K/UL — HIGH (ref 3.8–10.5)
WBC # FLD AUTO: 21.36 K/UL — HIGH (ref 3.8–10.5)

## 2023-01-11 PROCEDURE — 99232 SBSQ HOSP IP/OBS MODERATE 35: CPT

## 2023-01-11 PROCEDURE — 99233 SBSQ HOSP IP/OBS HIGH 50: CPT

## 2023-01-11 RX ORDER — INSULIN GLARGINE 100 [IU]/ML
36 INJECTION, SOLUTION SUBCUTANEOUS AT BEDTIME
Refills: 0 | Status: DISCONTINUED | OUTPATIENT
Start: 2023-01-11 | End: 2023-01-12

## 2023-01-11 RX ORDER — INSULIN LISPRO 100/ML
VIAL (ML) SUBCUTANEOUS
Refills: 0 | Status: DISCONTINUED | OUTPATIENT
Start: 2023-01-11 | End: 2023-01-13

## 2023-01-11 RX ORDER — INSULIN LISPRO 100/ML
10 VIAL (ML) SUBCUTANEOUS ONCE
Refills: 0 | Status: COMPLETED | OUTPATIENT
Start: 2023-01-11 | End: 2023-01-11

## 2023-01-11 RX ORDER — INSULIN LISPRO 100/ML
VIAL (ML) SUBCUTANEOUS AT BEDTIME
Refills: 0 | Status: DISCONTINUED | OUTPATIENT
Start: 2023-01-11 | End: 2023-01-13

## 2023-01-11 RX ORDER — INSULIN LISPRO 100/ML
23 VIAL (ML) SUBCUTANEOUS
Refills: 0 | Status: DISCONTINUED | OUTPATIENT
Start: 2023-01-11 | End: 2023-01-11

## 2023-01-11 RX ORDER — INSULIN LISPRO 100/ML
22 VIAL (ML) SUBCUTANEOUS
Refills: 0 | Status: DISCONTINUED | OUTPATIENT
Start: 2023-01-11 | End: 2023-01-12

## 2023-01-11 RX ADMIN — Medication 25 MILLIGRAM(S): at 17:29

## 2023-01-11 RX ADMIN — LEVALBUTEROL 0.63 MILLIGRAM(S): 1.25 SOLUTION, CONCENTRATE RESPIRATORY (INHALATION) at 21:35

## 2023-01-11 RX ADMIN — Medication 2: at 08:50

## 2023-01-11 RX ADMIN — Medication 16 UNIT(S): at 13:45

## 2023-01-11 RX ADMIN — LOSARTAN POTASSIUM 100 MILLIGRAM(S): 100 TABLET, FILM COATED ORAL at 06:02

## 2023-01-11 RX ADMIN — BUDESONIDE AND FORMOTEROL FUMARATE DIHYDRATE 2 PUFF(S): 160; 4.5 AEROSOL RESPIRATORY (INHALATION) at 21:54

## 2023-01-11 RX ADMIN — LEVALBUTEROL 0.63 MILLIGRAM(S): 1.25 SOLUTION, CONCENTRATE RESPIRATORY (INHALATION) at 08:22

## 2023-01-11 RX ADMIN — MONTELUKAST 10 MILLIGRAM(S): 4 TABLET, CHEWABLE ORAL at 12:19

## 2023-01-11 RX ADMIN — Medication 81 MILLIGRAM(S): at 12:20

## 2023-01-11 RX ADMIN — Medication 25 MILLIGRAM(S): at 06:03

## 2023-01-11 RX ADMIN — LEVALBUTEROL 0.63 MILLIGRAM(S): 1.25 SOLUTION, CONCENTRATE RESPIRATORY (INHALATION) at 15:24

## 2023-01-11 RX ADMIN — Medication 12: at 12:18

## 2023-01-11 RX ADMIN — INSULIN GLARGINE 36 UNIT(S): 100 INJECTION, SOLUTION SUBCUTANEOUS at 21:55

## 2023-01-11 RX ADMIN — AMLODIPINE BESYLATE 10 MILLIGRAM(S): 2.5 TABLET ORAL at 06:02

## 2023-01-11 RX ADMIN — Medication 0.1 MILLIGRAM(S): at 17:29

## 2023-01-11 RX ADMIN — Medication 40 MILLIGRAM(S): at 06:03

## 2023-01-11 RX ADMIN — BUDESONIDE AND FORMOTEROL FUMARATE DIHYDRATE 2 PUFF(S): 160; 4.5 AEROSOL RESPIRATORY (INHALATION) at 08:49

## 2023-01-11 RX ADMIN — ATORVASTATIN CALCIUM 80 MILLIGRAM(S): 80 TABLET, FILM COATED ORAL at 21:54

## 2023-01-11 RX ADMIN — APIXABAN 5 MILLIGRAM(S): 2.5 TABLET, FILM COATED ORAL at 06:03

## 2023-01-11 RX ADMIN — Medication 10 UNIT(S): at 16:28

## 2023-01-11 RX ADMIN — Medication 2: at 21:55

## 2023-01-11 RX ADMIN — Medication 40 MILLIGRAM(S): at 06:02

## 2023-01-11 RX ADMIN — Medication 0.1 MILLIGRAM(S): at 06:02

## 2023-01-11 RX ADMIN — APIXABAN 5 MILLIGRAM(S): 2.5 TABLET, FILM COATED ORAL at 17:29

## 2023-01-11 RX ADMIN — LEVALBUTEROL 0.63 MILLIGRAM(S): 1.25 SOLUTION, CONCENTRATE RESPIRATORY (INHALATION) at 03:09

## 2023-01-11 RX ADMIN — Medication 10 MILLIGRAM(S): at 21:54

## 2023-01-11 RX ADMIN — TIOTROPIUM BROMIDE 2 PUFF(S): 18 CAPSULE ORAL; RESPIRATORY (INHALATION) at 12:22

## 2023-01-11 NOTE — PROGRESS NOTE ADULT - PROBLEM SELECTOR PLAN 1
2/2 sepsis and hypoxia, reverted back into afib briefly overnight  - transitioned to eliquis  - Cardiology recs appreciate: c/w lopressor 25mg BID  - TTE result reviewed  - EP recs appreciated

## 2023-01-11 NOTE — PROGRESS NOTE ADULT - SUBJECTIVE AND OBJECTIVE BOX
chief complaint:        Vital Signs Last 24 Hrs  T(C): 36.7 (11 Jan 2023 17:15), Max: 36.9 (10 Angel 2023 21:30)  T(F): 98 (11 Jan 2023 17:15), Max: 98.5 (11 Jan 2023 06:00)  HR: 88 (11 Jan 2023 17:15) (68 - 91)  BP: 141/70 (11 Jan 2023 17:15) (94/62 - 141/70)  BP(mean): --  RR: 17 (11 Jan 2023 17:15) (17 - 18)  SpO2: 98% (11 Jan 2023 17:15) (95% - 100%)    Parameters below as of 11 Jan 2023 17:15  Patient On (Oxygen Delivery Method): room air    Telemetry: Atrial fibrillation with ventricular rates 's. PVC's/couplets    MEDICATIONS  (STANDING):  amLODIPine   Tablet 10 milliGRAM(s) Oral daily  apixaban 5 milliGRAM(s) Oral every 12 hours  aspirin enteric coated 81 milliGRAM(s) Oral daily  atorvastatin 80 milliGRAM(s) Oral at bedtime  budesonide 160 MICROgram(s)/formoterol 4.5 MICROgram(s) Inhaler 2 Puff(s) Inhalation two times a day  cloNIDine 0.1 milliGRAM(s) Oral two times a day  coronavirus bivalent (EUA) Booster Vaccine (PFIZER) 0.3 milliLiter(s) IntraMuscular once  dextrose 5%. 1000 milliLiter(s) (100 mL/Hr) IV Continuous <Continuous>  dextrose 5%. 1000 milliLiter(s) (50 mL/Hr) IV Continuous <Continuous>  dextrose 50% Injectable 25 Gram(s) IV Push once  dextrose 50% Injectable 12.5 Gram(s) IV Push once  dextrose 50% Injectable 25 Gram(s) IV Push once  furosemide    Tablet 40 milliGRAM(s) Oral daily  glucagon  Injectable 1 milliGRAM(s) IntraMuscular once  insulin glargine Injectable (LANTUS) 36 Unit(s) SubCutaneous at bedtime  insulin lispro (ADMELOG) corrective regimen sliding scale   SubCutaneous three times a day before meals  insulin lispro (ADMELOG) corrective regimen sliding scale   SubCutaneous at bedtime  insulin lispro Injectable (ADMELOG) 22 Unit(s) SubCutaneous three times a day before meals  levalbuterol Inhalation 0.63 milliGRAM(s) Inhalation every 6 hours  losartan 100 milliGRAM(s) Oral daily  metoclopramide 10 milliGRAM(s) Oral at bedtime  metoprolol tartrate 25 milliGRAM(s) Oral two times a day  montelukast 10 milliGRAM(s) Oral daily  predniSONE   Tablet 40 milliGRAM(s) Oral daily  tiotropium 2.5 MICROgram(s) Inhaler 2 Puff(s) Inhalation daily    MEDICATIONS  (PRN):  benzonatate 100 milliGRAM(s) Oral every 8 hours PRN Cough  dextrose Oral Gel 15 Gram(s) Oral once PRN Blood Glucose LESS THAN 70 milliGRAM(s)/deciliter  guaiFENesin Oral Liquid (Sugar-Free) 100 milliGRAM(s) Oral every 6 hours PRN Cough          Physical exam:   Gen- well developed well nourished NAD  Resp-  Diffuse wheezing. No rales or rhonchi appreciated.  CV- S1 and S2 irregular irregular. No murmurs, gallops or rubs.   ABD- soft nontender + bowel sounds.   EXT- no edema no calf tenderness  Neuro- grossly nonfocal                            12.2   21.36 )-----------( 515      ( 11 Jan 2023 06:55 )             36.2     PTT - ( 10 Angel 2023 15:00 )  PTT:75.1 sec  01-11    138  |  104  |  27<H>  ----------------------------<  186<H>  3.5   |  23  |  1.18    Ca    8.2<L>      11 Jan 2023 06:55  Phos  4.0     01-11  Mg     2.00     01-11      < from: Transthoracic Echocardiogram (01.10.23 @ 10:17) >  Patient name: MD MICHELINE  YOB: 1955   Age: 67 (M)   MR#: [id number]  Study Date: 1/10/2023  Location: [id number]: Edelmira Nguyen RDCS  Study quality: Technically good  Referring Physician: Arsalan Maria MD  Blood Pressure: 127/85 mmHg  Height: 165 cm  Weight: 70 kg  BSA: 1.8 m2  ------------------------------------------------------------------------  PROCEDURE: Transthoracic echocardiogram with 2-D, M-Mode  and complete spectral and color flow Doppler.  INDICATION: Abnormal electrocardiogram (ECG) (EKG) (R94.31)  ------------------------------------------------------------------------  DIMENSIONS:  Dimensions:     Normal Values:  LA:     3.7 cm    2.0 - 4.0 cm  Ao:     3.3 cm    2.0 - 3.8 cm  SEPTUM: 1.0 cm    0.6 - 1.2 cm  PWT:    1.0 cm    0.6 - 1.1 cm  LVIDd:  5.4 cm    3.0 - 5.6 cm  LVIDs:    ---     1.8 - 4.0 cm  Derived Variables:  LVMI: 117 g/m2  RWT: 0.37  Ejection Fraction (Visual Estimate): 55 %  ------------------------------------------------------------------------  OBSERVATIONS:  Mitral Valve: Mitral annular calcification, otherwise  normal mitral valve. Mild mitral regurgitation.  Aortic Root: Normal aortic root.  Aortic Valve: Calcified trileaflet aortic valve with normal  opening. Mild-moderate aortic regurgitation.  Left Atrium: Normal left atrium.  LA volume index = 28  cc/m2.  Left Ventricle: Normal left ventricular systolic function.  No segmental wall motion abnormalities. Eccentric left  ventricular hypertrophy (dilated left ventricle withnormal  relative wall thickness).  Right Heart: Normal right atrium. Normal right ventricular  size and function. Normal tricuspid valve.   Minimal  tricuspid regurgitation. Normal pulmonic valve. Minimal  pulmonic regurgitation.  Pericardium/PleuraNormal pericardium with no pericardial  effusion.  Hemodynamic: Estimated right ventricular systolic pressure  equals 30 mm Hg, assuming right atrial pressure equals 10  mm Hg, consistent with normal pulmonary pressures.  ------------------------------------------------------------------------  CONCLUSIONS:  1. Mitral annular calcification, otherwise normal mitral  valve. Mild mitral regurgitation.  2. Calcified trileaflet aortic valve with normal opening.  3. Eccentric left ventricular hypertrophy (dilated left  ventricle with normal relative wall thickness).  4. Normal left ventricular systolic function. No segmental  wall motion abnormalities.  ------------------------------------------------------------------------  < from: Transthoracic Echocardiogram (01.10.23 @ 10:17) >  DAVIS YOUSIFVI  ------------------------------------------------------------------------          < end of copied text >                       Patient seen earlier today. Denies chest pain, palpitations or lightheadedness. Remains short of breath.   On steroids with elevated blood glucose levels.     Vital Signs Last 24 Hrs  T(C): 36.7 (11 Jan 2023 17:15), Max: 36.9 (10 Angel 2023 21:30)  T(F): 98 (11 Jan 2023 17:15), Max: 98.5 (11 Jan 2023 06:00)  HR: 88 (11 Jan 2023 17:15) (68 - 91)  BP: 141/70 (11 Jan 2023 17:15) (94/62 - 141/70)  BP(mean): --  RR: 17 (11 Jan 2023 17:15) (17 - 18)  SpO2: 98% (11 Jan 2023 17:15) (95% - 100%)    Parameters below as of 11 Jan 2023 17:15  Patient On (Oxygen Delivery Method): room air    Telemetry: Atrial fibrillation with ventricular rates 's. PVC's/couplets    MEDICATIONS  (STANDING):  amLODIPine   Tablet 10 milliGRAM(s) Oral daily  apixaban 5 milliGRAM(s) Oral every 12 hours  aspirin enteric coated 81 milliGRAM(s) Oral daily  atorvastatin 80 milliGRAM(s) Oral at bedtime  budesonide 160 MICROgram(s)/formoterol 4.5 MICROgram(s) Inhaler 2 Puff(s) Inhalation two times a day  cloNIDine 0.1 milliGRAM(s) Oral two times a day  coronavirus bivalent (EUA) Booster Vaccine (PFIZER) 0.3 milliLiter(s) IntraMuscular once  dextrose 5%. 1000 milliLiter(s) (100 mL/Hr) IV Continuous <Continuous>  dextrose 5%. 1000 milliLiter(s) (50 mL/Hr) IV Continuous <Continuous>  dextrose 50% Injectable 25 Gram(s) IV Push once  dextrose 50% Injectable 12.5 Gram(s) IV Push once  dextrose 50% Injectable 25 Gram(s) IV Push once  furosemide    Tablet 40 milliGRAM(s) Oral daily  glucagon  Injectable 1 milliGRAM(s) IntraMuscular once  insulin glargine Injectable (LANTUS) 36 Unit(s) SubCutaneous at bedtime  insulin lispro (ADMELOG) corrective regimen sliding scale   SubCutaneous three times a day before meals  insulin lispro (ADMELOG) corrective regimen sliding scale   SubCutaneous at bedtime  insulin lispro Injectable (ADMELOG) 22 Unit(s) SubCutaneous three times a day before meals  levalbuterol Inhalation 0.63 milliGRAM(s) Inhalation every 6 hours  losartan 100 milliGRAM(s) Oral daily  metoclopramide 10 milliGRAM(s) Oral at bedtime  metoprolol tartrate 25 milliGRAM(s) Oral two times a day  montelukast 10 milliGRAM(s) Oral daily  predniSONE   Tablet 40 milliGRAM(s) Oral daily  tiotropium 2.5 MICROgram(s) Inhaler 2 Puff(s) Inhalation daily    MEDICATIONS  (PRN):  benzonatate 100 milliGRAM(s) Oral every 8 hours PRN Cough  dextrose Oral Gel 15 Gram(s) Oral once PRN Blood Glucose LESS THAN 70 milliGRAM(s)/deciliter  guaiFENesin Oral Liquid (Sugar-Free) 100 milliGRAM(s) Oral every 6 hours PRN Cough          Physical exam:   Gen- well developed well nourished NAD  Resp-  Diffuse wheezing. No rales or rhonchi appreciated.  CV- S1 and S2 irregular irregular. No murmurs, gallops or rubs.   ABD- soft nontender + bowel sounds.   EXT- no edema no calf tenderness  Neuro- grossly nonfocal                            12.2   21.36 )-----------( 515      ( 11 Jan 2023 06:55 )             36.2     PTT - ( 10 Angel 2023 15:00 )  PTT:75.1 sec  01-11    138  |  104  |  27<H>  ----------------------------<  186<H>  3.5   |  23  |  1.18    Ca    8.2<L>      11 Jan 2023 06:55  Phos  4.0     01-11  Mg     2.00     01-11      < from: Transthoracic Echocardiogram (01.10.23 @ 10:17) >  Patient name: MD MICHELINE  YOB: 1955   Age: 67 (M)   MR#: [id number]  Study Date: 1/10/2023  Location: [id number]: Edelmira Nguyen RDCS  Study quality: Technically good  Referring Physician: Arsalan Maria MD  Blood Pressure: 127/85 mmHg  Height: 165 cm  Weight: 70 kg  BSA: 1.8 m2  ------------------------------------------------------------------------  PROCEDURE: Transthoracic echocardiogram with 2-D, M-Mode  and complete spectral and color flow Doppler.  INDICATION: Abnormal electrocardiogram (ECG) (EKG) (R94.31)  ------------------------------------------------------------------------  DIMENSIONS:  Dimensions:     Normal Values:  LA:     3.7 cm    2.0 - 4.0 cm  Ao:     3.3 cm    2.0 - 3.8 cm  SEPTUM: 1.0 cm    0.6 - 1.2 cm  PWT:    1.0 cm    0.6 - 1.1 cm  LVIDd:  5.4 cm    3.0 - 5.6 cm  LVIDs:    ---     1.8 - 4.0 cm  Derived Variables:  LVMI: 117 g/m2  RWT: 0.37  Ejection Fraction (Visual Estimate): 55 %  ------------------------------------------------------------------------  OBSERVATIONS:  Mitral Valve: Mitral annular calcification, otherwise  normal mitral valve. Mild mitral regurgitation.  Aortic Root: Normal aortic root.  Aortic Valve: Calcified trileaflet aortic valve with normal  opening. Mild-moderate aortic regurgitation.  Left Atrium: Normal left atrium.  LA volume index = 28  cc/m2.  Left Ventricle: Normal left ventricular systolic function.  No segmental wall motion abnormalities. Eccentric left  ventricular hypertrophy (dilated left ventricle withnormal  relative wall thickness).  Right Heart: Normal right atrium. Normal right ventricular  size and function. Normal tricuspid valve.   Minimal  tricuspid regurgitation. Normal pulmonic valve. Minimal  pulmonic regurgitation.  Pericardium/PleuraNormal pericardium with no pericardial  effusion.  Hemodynamic: Estimated right ventricular systolic pressure  equals 30 mm Hg, assuming right atrial pressure equals 10  mm Hg, consistent with normal pulmonary pressures.  ------------------------------------------------------------------------  CONCLUSIONS:  1. Mitral annular calcification, otherwise normal mitral  valve. Mild mitral regurgitation.  2. Calcified trileaflet aortic valve with normal opening.  3. Eccentric left ventricular hypertrophy (dilated left  ventricle with normal relative wall thickness).  4. Normal left ventricular systolic function. No segmental  wall motion abnormalities.  ------------------------------------------------------------------------  < from: Transthoracic Echocardiogram (01.10.23 @ 10:17) >  DAVIS YOUSIFVI  ------------------------------------------------------------------------          < end of copied text >

## 2023-01-11 NOTE — PROVIDER CONTACT NOTE (OTHER) - SITUATION
Pt FS before breakfast 402 and immediate recheck 429. Pt asymptomatic and resting comfortably in bed. Pt noncompliant with consistent carb diet. INES Mane made aware.
Pt FS recheck after lunch time sliding scale 434 and immediate recheck 399. Pt asymptomatic and resting comfortably in bed. Pt noncompliant with consistent carb diet. INES Ramirez made aware.
Patient blood sugar 484
Pt afternoon blood glucose reading was 473
High blood glucose 445, repeat 446
Pt FS recheck after premeal insulin 411 and immediate recheck 416. Pt asymptomatic and resting comfortably in bed. Pt noncompliant with consistent carb diet. INES Ramirez made aware.
Pt w/ HR briefly down to 30s & 3.66 sec non conversion pause while sleeping then back up to 70s-90s.
Pt with elevated bp 194/56

## 2023-01-11 NOTE — PROVIDER CONTACT NOTE (OTHER) - RECOMMENDATIONS
INES Sanches paged
12 units of sliding scale insulin given as per provider order. Pre meal held and will be given once Pt lunch from home arrives and after rechecking blood glucose
Continue telemetry monitoring
Give STAT one time order of 10 units Admelog. Sliding scale, premeal, and Lantus doses adjusted. Continue to educate patient and family on DM2.
INES Sanches made aware
Give 16 units pre-meal insulin as ordered. Recheck FS in 1 hour. Continue to educate patient and family on DM2.
Give sliding scale, pre-meal, and LANTUS as ordered. Continue to educate patient and family on DM2.

## 2023-01-11 NOTE — CHART NOTE - NSCHARTNOTEFT_GEN_A_CORE
66 yo M with DM2 on high dose steroids with steroid induced hyperglycemia.  Severe hyperglycemia since 1/6/23 despite escalating basal bolus doses.  s/p methylpred 40 mg (last dose 1/10), now on prednisone 40 mg daily (slightly lower steroid equivalent but still very hyperglycemic, no hypos), planned taper as outpatient.    Plan:  Recommend lantus 36 units QHS  Recommend admelog 22 units with meals  Moderate dose correction scales premeals and QHS.  Adjusted orders.  Please notify endocrine of planned endocrine taper as this will impact insulin dosing.  Pt should be discharged on basal bolus insulin and will need insulin pen teaching.  Stop glyburide. Can continue metformin on discharge    full consult tomorrow    Antonietta Rojas MD  Attending Physician   Department of Endocrinology, Diabetes and Metabolism   [employer] Teams on 01-11-23 @ 17:32    If before 9AM or after 5PM, or on weekends/holidays, please call the Endocrine answering service for assistance ([phone]).  For nonurgent matters, please email [email or web address] for assistance.

## 2023-01-11 NOTE — PROGRESS NOTE ADULT - ASSESSMENT
67 year old man with CAD s/p PCI circa 2014 in [facility], HTN, Asthma, and DM who presents with sepsis secondary to influenza and new onset a-fib with RVR for which EP is consulted.    Son at bedside to corroborate history. Pt states he is feeling better since admission with regards to dyspnea and respiratory symptoms, however, still with diffuse wheezing. Able to ambulate to bathroom  without significant symptoms. Telemetry shows Afib 's with PVC's and occasional pauses to 3 seconds.  Hemodynamically stable and asymptomatic during episodes.    Patient currently rate controlled on metoprolol tartrate 25mg bid.   ECHO: Normal left ventricular systolic function. No segmental wall motion abnormalities. LVEF 55%.   Recommendations  - Continue monitoring on telemetry  - Agree with Metoprolol tartrate 25 mg bid; pauses all < 4 sec and not concerning at this time, not a contraindication to continuation/uptitration of BB    - [id number] 4; Now on Eliquis 5 mg bid   - As always, K > 4 Mg > 2  Patient to follow-up with Dr. Greene as an outpatient to discuss long term rhythm control strategies for atrial fibrillation. He has an appointment on 2/22/23 at 8:30am  4th floor Oncology Building [phone].

## 2023-01-11 NOTE — PROVIDER CONTACT NOTE (OTHER) - REASON
FS above 400
FS above 400
High blood glucose 445, repeat 446
HR briefly down to 30s & 3.66 sec non conversion pause
Pt with elevated bp 194/56
FS above 400
Patients blood sugar 484 after 2 checks
blood glucose reading of 473
Patient with one or more new problems requiring additional work-up/treatment.

## 2023-01-11 NOTE — PROVIDER CONTACT NOTE (OTHER) - NAME OF MD/NP/PA/DO NOTIFIED:
ACP Verónica
ACP Verónica
Ashley
INES Sanches Perry
INES Vail
[facility] Brock
INES Graves
INES Graves

## 2023-01-11 NOTE — PROVIDER CONTACT NOTE (OTHER) - ACTION/TREATMENT ORDERED:
Give 16 units pre-meal insulin as ordered. Recheck FS in 1 hour. Continue to educate patient and family on DM2.
Will follow insulin orders for blood glucose. safety maintained
12 units of sliding scale insulin given as per provider order. Pre meal held and will be given once Pt lunch from home arrives and after rechecking blood glucose
BP to be repeated after respiratory treatment.
Continue to monitor.
Give sliding scale, pre-meal, and LANTUS as ordered. Continue to educate patient and family on DM2.
INES Sanches notified, no new orders at this time. Insulin given as ordered.
STAT one time order of 10 units Admelog given. Sliding scale, premeal, and Lantus doses adjusted. Continue to educate patient and family on DM2.

## 2023-01-11 NOTE — PROVIDER CONTACT NOTE (OTHER) - BACKGROUND
PT did not receive morning dose of lantus as it was too close to the last administered dose. Pt also did not receive the pre meal coverage due to not having breakfast. Pt afternoon bg reading was 473

## 2023-01-11 NOTE — PROGRESS NOTE ADULT - PROBLEM SELECTOR PLAN 6
Home meds: Glyburide and Metformin, Reported A1c ~8.5   - C/b hyperglycemia, likely 2/2 steroid use   -increase lantus and premeal  - Mod SSI   - consulted endo for insulin management given patient is being dc'ed on steroid taper  -Son reports gastroparesis, c/w Reglan

## 2023-01-11 NOTE — PROVIDER CONTACT NOTE (OTHER) - DATE AND TIME:
09-Jan-2023 09:22
11-Jan-2023 12:27
07-Jan-2023 12:27
08-Jan-2023 05:04
10-Angel-2023 08:35
11-Jan-2023 13:45
11-Jan-2023 15:45

## 2023-01-11 NOTE — PROGRESS NOTE ADULT - SUBJECTIVE AND OBJECTIVE BOX
PROGRESS NOTE:     Patient is a 67y old  Male who presents with a chief complaint of sob (10 Angel 2023 10:36)      SUBJECTIVE / OVERNIGHT EVENTS: no acute event overnight. FS continues to be poorly controlled during lunch time and dinner. Family brings in roti today for lunch. Yesterday per nursing, patient ate a loaf of pound cake.     ADDITIONAL REVIEW OF SYSTEMS:    MEDICATIONS  (STANDING):  amLODIPine   Tablet 10 milliGRAM(s) Oral daily  apixaban 5 milliGRAM(s) Oral every 12 hours  aspirin enteric coated 81 milliGRAM(s) Oral daily  atorvastatin 80 milliGRAM(s) Oral at bedtime  budesonide 160 MICROgram(s)/formoterol 4.5 MICROgram(s) Inhaler 2 Puff(s) Inhalation two times a day  cloNIDine 0.1 milliGRAM(s) Oral two times a day  coronavirus bivalent (EUA) Booster Vaccine (PFIZER) 0.3 milliLiter(s) IntraMuscular once  dextrose 5%. 1000 milliLiter(s) (100 mL/Hr) IV Continuous <Continuous>  dextrose 5%. 1000 milliLiter(s) (50 mL/Hr) IV Continuous <Continuous>  dextrose 50% Injectable 25 Gram(s) IV Push once  dextrose 50% Injectable 12.5 Gram(s) IV Push once  dextrose 50% Injectable 25 Gram(s) IV Push once  furosemide    Tablet 40 milliGRAM(s) Oral daily  glucagon  Injectable 1 milliGRAM(s) IntraMuscular once  insulin glargine Injectable (LANTUS) 36 Unit(s) SubCutaneous at bedtime  insulin lispro (ADMELOG) corrective regimen sliding scale   SubCutaneous Before meals and at bedtime  insulin lispro Injectable (ADMELOG) 23 Unit(s) SubCutaneous three times a day before meals  levalbuterol Inhalation 0.63 milliGRAM(s) Inhalation every 6 hours  losartan 100 milliGRAM(s) Oral daily  metoclopramide 10 milliGRAM(s) Oral at bedtime  metoprolol tartrate 25 milliGRAM(s) Oral two times a day  montelukast 10 milliGRAM(s) Oral daily  predniSONE   Tablet 40 milliGRAM(s) Oral daily  tiotropium 2.5 MICROgram(s) Inhaler 2 Puff(s) Inhalation daily    MEDICATIONS  (PRN):  benzonatate 100 milliGRAM(s) Oral every 8 hours PRN Cough  dextrose Oral Gel 15 Gram(s) Oral once PRN Blood Glucose LESS THAN 70 milliGRAM(s)/deciliter  guaiFENesin Oral Liquid (Sugar-Free) 100 milliGRAM(s) Oral every 6 hours PRN Cough      CAPILLARY BLOOD GLUCOSE      POCT Blood Glucose.: 416 mg/dL (11 Jan 2023 15:30)  POCT Blood Glucose.: 411 mg/dL (11 Jan 2023 15:28)  POCT Blood Glucose.: 399 mg/dL (11 Jan 2023 13:39)  POCT Blood Glucose.: 434 mg/dL (11 Jan 2023 13:34)  POCT Blood Glucose.: 473 mg/dL (11 Jan 2023 12:03)  POCT Blood Glucose.: 457 mg/dL (11 Jan 2023 12:01)  POCT Blood Glucose.: 196 mg/dL (11 Jan 2023 08:00)  POCT Blood Glucose.: 241 mg/dL (10 Angel 2023 21:45)  POCT Blood Glucose.: 391 mg/dL (10 Angel 2023 17:42)    I&O's Summary      PHYSICAL EXAM:  Vital Signs Last 24 Hrs  T(C): 36.6 (11 Jan 2023 10:22), Max: 37 (10 Angel 2023 18:00)  T(F): 97.9 (11 Jan 2023 10:22), Max: 98.6 (10 Angel 2023 18:00)  HR: 89 (11 Jan 2023 15:24) (68 - 98)  BP: 94/62 (11 Jan 2023 10:22) (94/62 - 154/68)  BP(mean): --  RR: 18 (11 Jan 2023 10:22) (18 - 18)  SpO2: 96% (11 Jan 2023 15:24) (95% - 100%)    Parameters below as of 11 Jan 2023 10:22  Patient On (Oxygen Delivery Method): room air        CONSTITUTIONAL: NAD, well-developed  RESPIRATORY: Normal respiratory effort; lungs are clear to auscultation bilaterally  CARDIOVASCULAR: Regular rate and rhythm, normal S1 and S2, no murmur/rub/gallop; No lower extremity edema  ABDOMEN: Nontender to palpation, normoactive bowel sounds, no rebound/guarding  MUSCLOSKELETAL: no clubbing or cyanosis of digits; no joint swelling or tenderness to palpation  SKIN: no rash, erythema, lesion  PSYCH: A+O to person, place, and time; affect appropriate    LABS:                        12.2   21.36 )-----------( 515      ( 11 Jan 2023 06:55 )             36.2     01-11    138  |  104  |  27<H>  ----------------------------<  186<H>  3.5   |  23  |  1.18    Ca    8.2<L>      11 Jan 2023 06:55  Phos  4.0     01-11  Mg     2.00     01-11      PTT - ( 10 Angel 2023 15:00 )  PTT:75.1 sec            RADIOLOGY & ADDITIONAL TESTS:  Results Reviewed:   Imaging Personally Reviewed:  Electrocardiogram Personally Reviewed:    COORDINATION OF CARE:  Care Discussed with Consultants/Other Providers [Y/N]:  Prior or Outpatient Records Reviewed [Y/N]:

## 2023-01-11 NOTE — PROVIDER CONTACT NOTE (OTHER) - ASSESSMENT
Pt FS recheck after lunch time sliding scale 434 and immediate recheck 399. Son at bedside with lunch for patient; patient requesting to eat. Pt asymptomatic and resting comfortably in bed. Pt noncompliant with consistent carb diet. ACP Ashley made aware.
Pt a&Ox4, asymptomatic, son remains at bedside
Pt a&Ox4, reported sob, denies chest pain. son at bedside, Respiratory therapist at bedside administering treatment
Pt afternoon blood glucose reading was 473
Pt was sleeping at time of episode/ in no acute distress. Asymptomatic upon further assessment. Kyle White at bedside assisting with translation.
Pt FS recheck after premeal insulin 411 and immediate recheck 416. Pt asymptomatic and resting comfortably in bed. Pt noncompliant with consistent carb diet. INES Ramirez made aware.
Patient asymptomatic, Patient denies SOB, chest pain, and discomfort. No acute changes noted. Patient is currently resting comfortably in bed. Patient safety and comfort maintained. Call bell within reach.
Pt FS before breakfast 402 and immediate recheck 429. Pt asymptomatic and resting comfortably in bed. Pt noncompliant with consistent carb diet. INES Mane made aware.

## 2023-01-12 ENCOUNTER — TRANSCRIPTION ENCOUNTER (OUTPATIENT)
Age: 68
End: 2023-01-12

## 2023-01-12 DIAGNOSIS — I10 ESSENTIAL (PRIMARY) HYPERTENSION: ICD-10-CM

## 2023-01-12 DIAGNOSIS — E78.5 HYPERLIPIDEMIA, UNSPECIFIED: ICD-10-CM

## 2023-01-12 LAB
ANION GAP SERPL CALC-SCNC: 11 MMOL/L — SIGNIFICANT CHANGE UP (ref 7–14)
BUN SERPL-MCNC: 24 MG/DL — HIGH (ref 7–23)
CALCIUM SERPL-MCNC: 8.4 MG/DL — SIGNIFICANT CHANGE UP (ref 8.4–10.5)
CHLORIDE SERPL-SCNC: 105 MMOL/L — SIGNIFICANT CHANGE UP (ref 98–107)
CO2 SERPL-SCNC: 23 MMOL/L — SIGNIFICANT CHANGE UP (ref 22–31)
CREAT SERPL-MCNC: 1.2 MG/DL — SIGNIFICANT CHANGE UP (ref 0.5–1.3)
EGFR: 65 ML/MIN/1.73M2 — SIGNIFICANT CHANGE UP
GLUCOSE BLDC GLUCOMTR-MCNC: 170 MG/DL — HIGH (ref 70–99)
GLUCOSE BLDC GLUCOMTR-MCNC: 211 MG/DL — HIGH (ref 70–99)
GLUCOSE BLDC GLUCOMTR-MCNC: 224 MG/DL — HIGH (ref 70–99)
GLUCOSE BLDC GLUCOMTR-MCNC: 227 MG/DL — HIGH (ref 70–99)
GLUCOSE BLDC GLUCOMTR-MCNC: 356 MG/DL — HIGH (ref 70–99)
GLUCOSE SERPL-MCNC: 151 MG/DL — HIGH (ref 70–99)
HCT VFR BLD CALC: 37.9 % — LOW (ref 39–50)
HGB BLD-MCNC: 12.7 G/DL — LOW (ref 13–17)
MAGNESIUM SERPL-MCNC: 2.1 MG/DL — SIGNIFICANT CHANGE UP (ref 1.6–2.6)
MCHC RBC-ENTMCNC: 28.9 PG — SIGNIFICANT CHANGE UP (ref 27–34)
MCHC RBC-ENTMCNC: 33.5 GM/DL — SIGNIFICANT CHANGE UP (ref 32–36)
MCV RBC AUTO: 86.3 FL — SIGNIFICANT CHANGE UP (ref 80–100)
NRBC # BLD: 0 /100 WBCS — SIGNIFICANT CHANGE UP (ref 0–0)
NRBC # FLD: 0 K/UL — SIGNIFICANT CHANGE UP (ref 0–0)
PHOSPHATE SERPL-MCNC: 4.5 MG/DL — SIGNIFICANT CHANGE UP (ref 2.5–4.5)
PLATELET # BLD AUTO: 511 K/UL — HIGH (ref 150–400)
POTASSIUM SERPL-MCNC: 3.4 MMOL/L — LOW (ref 3.5–5.3)
POTASSIUM SERPL-SCNC: 3.4 MMOL/L — LOW (ref 3.5–5.3)
RBC # BLD: 4.39 M/UL — SIGNIFICANT CHANGE UP (ref 4.2–5.8)
RBC # FLD: 15.9 % — HIGH (ref 10.3–14.5)
SODIUM SERPL-SCNC: 139 MMOL/L — SIGNIFICANT CHANGE UP (ref 135–145)
WBC # BLD: 20.05 K/UL — HIGH (ref 3.8–10.5)
WBC # FLD AUTO: 20.05 K/UL — HIGH (ref 3.8–10.5)

## 2023-01-12 PROCEDURE — 99233 SBSQ HOSP IP/OBS HIGH 50: CPT

## 2023-01-12 PROCEDURE — 99255 IP/OBS CONSLTJ NEW/EST HI 80: CPT

## 2023-01-12 PROCEDURE — 99232 SBSQ HOSP IP/OBS MODERATE 35: CPT

## 2023-01-12 PROCEDURE — 99233 SBSQ HOSP IP/OBS HIGH 50: CPT | Mod: GC

## 2023-01-12 RX ORDER — ISOPROPYL ALCOHOL, BENZOCAINE .7; .06 ML/ML; ML/ML
1 SWAB TOPICAL
Qty: 100 | Refills: 1
Start: 2023-01-12 | End: 2023-03-02

## 2023-01-12 RX ORDER — INSULIN DETEMIR 100/ML (3)
30 INSULIN PEN (ML) SUBCUTANEOUS
Qty: 1 | Refills: 0
Start: 2023-01-12

## 2023-01-12 RX ORDER — INSULIN DETEMIR 100/ML (3)
30 INSULIN PEN (ML) SUBCUTANEOUS
Qty: 5 | Refills: 0
Start: 2023-01-12

## 2023-01-12 RX ORDER — INSULIN LISPRO 100/ML
10 VIAL (ML) SUBCUTANEOUS
Qty: 5 | Refills: 0
Start: 2023-01-12

## 2023-01-12 RX ORDER — INSULIN GLARGINE 100 [IU]/ML
36 INJECTION, SOLUTION SUBCUTANEOUS
Qty: 5 | Refills: 0
Start: 2023-01-12

## 2023-01-12 RX ORDER — INSULIN LISPRO 100/ML
24 VIAL (ML) SUBCUTANEOUS
Refills: 0 | Status: DISCONTINUED | OUTPATIENT
Start: 2023-01-12 | End: 2023-01-13

## 2023-01-12 RX ORDER — INSULIN GLARGINE 100 [IU]/ML
40 INJECTION, SOLUTION SUBCUTANEOUS AT BEDTIME
Refills: 0 | Status: DISCONTINUED | OUTPATIENT
Start: 2023-01-12 | End: 2023-01-13

## 2023-01-12 RX ORDER — INSULIN LISPRO 100/ML
36 VIAL (ML) SUBCUTANEOUS
Qty: 5 | Refills: 0
Start: 2023-01-12 | End: 2023-02-10

## 2023-01-12 RX ADMIN — Medication 0.1 MILLIGRAM(S): at 17:05

## 2023-01-12 RX ADMIN — ATORVASTATIN CALCIUM 80 MILLIGRAM(S): 80 TABLET, FILM COATED ORAL at 21:56

## 2023-01-12 RX ADMIN — APIXABAN 5 MILLIGRAM(S): 2.5 TABLET, FILM COATED ORAL at 05:51

## 2023-01-12 RX ADMIN — LEVALBUTEROL 0.63 MILLIGRAM(S): 1.25 SOLUTION, CONCENTRATE RESPIRATORY (INHALATION) at 15:54

## 2023-01-12 RX ADMIN — BUDESONIDE AND FORMOTEROL FUMARATE DIHYDRATE 2 PUFF(S): 160; 4.5 AEROSOL RESPIRATORY (INHALATION) at 08:46

## 2023-01-12 RX ADMIN — LEVALBUTEROL 0.63 MILLIGRAM(S): 1.25 SOLUTION, CONCENTRATE RESPIRATORY (INHALATION) at 03:32

## 2023-01-12 RX ADMIN — Medication 0.1 MILLIGRAM(S): at 05:51

## 2023-01-12 RX ADMIN — Medication 40 MILLIGRAM(S): at 05:51

## 2023-01-12 RX ADMIN — INSULIN GLARGINE 40 UNIT(S): 100 INJECTION, SOLUTION SUBCUTANEOUS at 22:07

## 2023-01-12 RX ADMIN — APIXABAN 5 MILLIGRAM(S): 2.5 TABLET, FILM COATED ORAL at 17:05

## 2023-01-12 RX ADMIN — MONTELUKAST 10 MILLIGRAM(S): 4 TABLET, CHEWABLE ORAL at 12:05

## 2023-01-12 RX ADMIN — AMLODIPINE BESYLATE 10 MILLIGRAM(S): 2.5 TABLET ORAL at 05:52

## 2023-01-12 RX ADMIN — LEVALBUTEROL 0.63 MILLIGRAM(S): 1.25 SOLUTION, CONCENTRATE RESPIRATORY (INHALATION) at 09:44

## 2023-01-12 RX ADMIN — Medication 24 UNIT(S): at 16:15

## 2023-01-12 RX ADMIN — Medication 10: at 12:06

## 2023-01-12 RX ADMIN — Medication 4: at 08:45

## 2023-01-12 RX ADMIN — Medication 22 UNIT(S): at 08:46

## 2023-01-12 RX ADMIN — LOSARTAN POTASSIUM 100 MILLIGRAM(S): 100 TABLET, FILM COATED ORAL at 05:51

## 2023-01-12 RX ADMIN — Medication 10 MILLIGRAM(S): at 21:56

## 2023-01-12 RX ADMIN — Medication 25 MILLIGRAM(S): at 05:51

## 2023-01-12 RX ADMIN — TIOTROPIUM BROMIDE 2 PUFF(S): 18 CAPSULE ORAL; RESPIRATORY (INHALATION) at 12:16

## 2023-01-12 RX ADMIN — Medication 4: at 18:36

## 2023-01-12 RX ADMIN — Medication 25 MILLIGRAM(S): at 17:05

## 2023-01-12 RX ADMIN — LEVALBUTEROL 0.63 MILLIGRAM(S): 1.25 SOLUTION, CONCENTRATE RESPIRATORY (INHALATION) at 22:01

## 2023-01-12 RX ADMIN — BUDESONIDE AND FORMOTEROL FUMARATE DIHYDRATE 2 PUFF(S): 160; 4.5 AEROSOL RESPIRATORY (INHALATION) at 21:56

## 2023-01-12 RX ADMIN — Medication 81 MILLIGRAM(S): at 12:05

## 2023-01-12 NOTE — DISCHARGE NOTE PROVIDER - NSDCMRMEDTOKEN_GEN_ALL_CORE_FT
amLODIPine 10 mg oral tablet: 1 tab(s) orally once a day  cloNIDine 0.1 mg oral tablet: 1 tab(s) orally 2 times a day  glyBURIDE 5 mg oral tablet: 1 tab(s) orally once a day  losartan 100 mg oral tablet: 1 tab(s) orally once a day  metFORMIN 1000 mg oral tablet: 1 tab(s) orally 2 times a day  ProAir HFA 90 mcg/inh inhalation aerosol: 2 puff(s) inhaled every 6 hours  Reglan 10 mg oral tablet: 1 tab(s) orally once a day (at bedtime)  Singulair 10 mg oral tablet: 1 tab(s) orally once a day  Symbicort 160 mcg-4.5 mcg/inh inhalation aerosol: 2 puff(s) inhaled 2 times a day   alcohol swabs : Apply topically to affected area 4 times a day   amLODIPine 10 mg oral tablet: 1 tab(s) orally once a day  cloNIDine 0.1 mg oral tablet: 1 tab(s) orally 2 times a day  glucometer (per patient&#x27;s insurance): Test blood sugars four times a day. Dispense #1 glucometer.  glyBURIDE 5 mg oral tablet: 1 tab(s) orally once a day  HumaLOG KwikPen 100 units/mL injectable solution: 36 unit(s) injectable 3 times a day (with meals) MDD:insurance check pager [phone]  Insulin Pen Needles, 4mm: 1 application subcutaneously 4 times a day. ** Use with insulin pen **   lancets: 1 application subcutaneously 4 times a day   Lantus Solostar Pen 100 units/mL subcutaneous solution: 36 unit(s) subcutaneous once a day (at bedtime)   losartan 100 mg oral tablet: 1 tab(s) orally once a day  metFORMIN 1000 mg oral tablet: 1 tab(s) orally 2 times a day  ProAir HFA 90 mcg/inh inhalation aerosol: 2 puff(s) inhaled every 6 hours  Reglan 10 mg oral tablet: 1 tab(s) orally once a day (at bedtime)  Singulair 10 mg oral tablet: 1 tab(s) orally once a day  Symbicort 160 mcg-4.5 mcg/inh inhalation aerosol: 2 puff(s) inhaled 2 times a day  test strips (per patient&#x27;s insurance): 1 application subcutaneously 4 times a day. ** Compatible with patient&#x27;s glucometer **   alcohol swabs : Apply topically to affected area 4 times a day   amLODIPine 10 mg oral tablet: 1 tab(s) orally once a day  cloNIDine 0.1 mg oral tablet: 1 tab(s) orally 2 times a day  glucometer (per patient&#x27;s insurance): Test blood sugars four times a day. Dispense #1 glucometer.  glyBURIDE 5 mg oral tablet: 1 tab(s) orally once a day  Insulin Lispro KwikPen 100 units/mL injectable solution: Follow regimen subcutaneous injection three times per day with meals     While on prednisone 30 mg (1/13-15)  Admelog 20 units    While on prednisone 20 mg: (1/16-1/18)  Admelog 16 units    While on prednisone 10 mg (1/10-1/21)  admelog 12 units    OFF steroids: starting 1/22  lantus 18 units  admelog 6 units    Can also provide correction scale while on steroids:  Take additional admelog premeals based on sugar:  2 Unit(s) if Glucose 151 - 200  4 Unit(s) if Glucose 201 - 250  6 Unit(s) if Glucose 251 - 300  8 Unit(s) if Glucose 301 - 350  10 Unit(s) if Glucose 351 - 400  12 Unit(s) if Glucose GREATER THAN 400, call your doctor      Insulin Pen Needles, 4mm: 1 application subcutaneously 4 times a day. ** Use with insulin pen **   lancets: 1 application subcutaneously 4 times a day   Levemir FlexTouch 100 units/mL subcutaneous solution: Take subcutaneously once nightly - dose per below     While on prednisone 30 mg (1/13-1/15)  Lantus 36 units    While on prednisone 20 mg: (1/16-1/18)  Lantus 28 units     While on prednisone 10 mg (1/19- 1/21)  lantus 22 units    OFF steroids: Starting 1/22  lantus 18 units     losartan 100 mg oral tablet: 1 tab(s) orally once a day  metFORMIN 1000 mg oral tablet: 1 tab(s) orally 2 times a day  ProAir HFA 90 mcg/inh inhalation aerosol: 2 puff(s) inhaled every 6 hours  Reglan 10 mg oral tablet: 1 tab(s) orally once a day (at bedtime)  Singulair 10 mg oral tablet: 1 tab(s) orally once a day  Symbicort 160 mcg-4.5 mcg/inh inhalation aerosol: 2 puff(s) inhaled 2 times a day  test strips (per patient&#x27;s insurance): 1 application subcutaneously 4 times a day. ** Compatible with patient&#x27;s glucometer **   alcohol swabs : Apply topically to affected area 4 times a day   amLODIPine 10 mg oral tablet: 1 tab(s) orally once a day  apixaban 5 mg oral tablet: 1 tab(s) orally every 12 hours  aspirin 81 mg oral delayed release tablet: 1 tab(s) orally once a day  atorvastatin 80 mg oral tablet: 1 tab(s) orally once a day (at bedtime)  cloNIDine 0.1 mg oral tablet: 1 tab(s) orally 2 times a day  furosemide 40 mg oral tablet: 1 tab(s) orally once a day  glucometer (per patient&#x27;s insurance): Test blood sugars four times a day. Dispense #1 glucometer.  Insulin Lispro KwikPen 100 units/mL injectable solution: Follow regimen subcutaneous injection three times per day with meals     While on prednisone 30 mg (1/13-15)  Admelog 28 units    While on prednisone 20 mg: (1/16-1/18)  Admelog 22  units    While on prednisone 10 mg (1/10-1/21)  admelog 18 units    OFF steroids: starting 1/22  admelog 14 units    If premeal  sugars are less than 100, reduce admelog before meals to 10 units    Can also provide correction scale while on steroids:  Take additional admelog premeals based on sugar:  2 Unit(s) if Glucose 151 - 200  4 Unit(s) if Glucose 201 - 250  6 Unit(s) if Glucose 251 - 300  8 Unit(s) if Glucose 301 - 350  10 Unit(s) if Glucose 351 - 400  12 Unit(s) if Glucose GREATER THAN 400, call your doctor      Insulin Pen Needles, 4mm: 1 application subcutaneously 4 times a day. ** Use with insulin pen **   lancets: 1 application subcutaneously 4 times a day   Levemir FlexTouch 100 units/mL subcutaneous solution: Take subcutaneously once nightly - dose per below     While on prednisone 30 mg (1/13-1/15)  Lantus 40 units    While on prednisone 20 mg: (1/16-1/18)  Lantus 36 units     While on prednisone 10 mg (1/19- 1/21)  lantus 28 units    OFF steroids: Starting 1/22  lantus 22 units     If Off steroids and morning sugars less than 80, reduce levemir to 18units qhs     losartan 100 mg oral tablet: 1 tab(s) orally once a day  metFORMIN 1000 mg oral tablet: 1 tab(s) orally 2 times a day  metoprolol tartrate 25 mg oral tablet: 1 tab(s) orally 2 times a day  predniSONE 10 mg oral tablet: 3 tabs once a day x 3 days 1/13-1/15  2  tabs once a day x 3 days 1/16-1/18  1 tab  once a day x 3 days 1/19-1/21  ProAir HFA 90 mcg/inh inhalation aerosol: 2 puff(s) inhaled every 6 hours  Reglan 10 mg oral tablet: 1 tab(s) orally once a day (at bedtime)  Singulair 10 mg oral tablet: 1 tab(s) orally once a day  Symbicort 160 mcg-4.5 mcg/inh inhalation aerosol: 2 puff(s) inhaled 2 times a day  test strips (per patient&#x27;s insurance): 1 application subcutaneously 4 times a day. ** Compatible with patient&#x27;s glucometer **  tiotropium 2.5 mcg/inh inhalation aerosol: 2 puff(s) inhaled once a day

## 2023-01-12 NOTE — PROGRESS NOTE ADULT - ATTENDING COMMENTS
65M PMH Asthma/COPD, HTN, HLD, DM2, and CAD s/p PCI who presents with asthma/COPD exacerbation in the setting of Influenza A viral infection, now slowly improving. Found to have new onset atrial fibrillation with RVR. Now improved.    - Overall with significantly improved dyspnea  - Completed course of oseltamivir  - Decrease prednisone to 30 mg daily for 3 days, 20 mg daily for 3 days, 10 mg daily for 3 days, then stop  - Symbicort 160-4.5 mcg 2 puffs twice daily, rinse after use  - Spiriva Respimat 2.5 mcg 2 puffs once daily  - Continue levalbuterol q6h  - Montelukast 10 mg daily at bedtime  - Supplemental oxygen with NC@2 LPM, goal SpO2>90%  - No hypercapnia on blood gas  - Smoking cessation again strongly encouraged  - DVT ppx, on apixaban  - Discussed with patient at bedside
65M PMH Asthma/COPD, HTN, HLD, DM2, and CAD s/p PCI who presents with asthma/COPD exacerbation in the setting of Influenza A viral infection, now slowly improving. Found to have new onset atrial fibrillation with RVR.    - Improved dyspnea currently  - Complete course of oseltamivir  - Given hyperglycemia with improved dyspnea/wheezing, would decrease steroids to prednisone 40 mg daily with slow taper  - Symbicort 160-4.5 mcg 2 puffs twice daily, rinse after use  - Continue levalbuterol and ipratropium nebs q4-6h  - Montelukast 10 mg daily at bedtime  - Supplemental oxygen with NC@2 LPM, goal SpO2>90%  - No hypercapnia on blood gas  - I discussed with patient and son the importance of smoking cessation as he currently smokes 1/4-1/2 ppd  - DVT ppx --> on heparin gtt, consider transition to apixaban  - Will continue to follow
65M PMH Asthma/COPD, HTN, HLD, DM2, and CAD s/p PCI who presents with asthma/COPD exacerbation in the setting of Influenza A viral infection, now slowly improving. Found to have new onset atrial fibrillation with RVR.    - Reports significantly improved dyspnea  - Complete course of oseltamivir  - Prednisone taper 40 mg daily for 3 days, 30 mg daily for 3 days, 20 mg daily for 3 days, then 10 mg daily for 3 days then stop  - Symbicort 160-4.5 mcg 2 puffs twice daily, rinse after use  - Start Spiriva Respimat 2.5 mcg 2 puffs once daily  - D/c ipratropium  - Continue levalbuterol q6h  - Montelukast 10 mg daily at bedtime  - Supplemental oxygen with NC@2 LPM, goal SpO2>90%  - No hypercapnia on blood gas  - Smoking cessation again strongly encouraged  - DVT ppx, on heparin gtt, consider transitioning to apixaban  - Discussed with patient and son
67 year old man with CAD s/p PCI circa 2014 in [facility], HTN, Asthma, and DM who presents with sepsis secondary to influenza and a-fib with RVR. Patient with pAF at this time. Continue rate control with BB. Pauses not concerning at this time and not a contraindication to BB uptitration while being monitored. Agree with AC. Will likely benefit from ablation long term, which can be done as outpatient.

## 2023-01-12 NOTE — PROGRESS NOTE ADULT - ASSESSMENT
67 year old man with CAD s/p PCI circa 2014 in [facility], HTN, Asthma, and DM who presents with sepsis secondary to influenza and new onset a-fib with RVR for which EP is consulted. Son at bedside to translate. Pt states he is feeling better since admission with regards to dyspnea and respiratory symptoms, however, still with mild wheezing. Able to ambulate to bathroom  without significant symptoms. Telemetry shows Afib 's with PVC's and occasional pauses all < 3 seconds.  Hemodynamically stable and asymptomatic during episodes.    Patient currently rate controlled on metoprolol tartrate 25mg bid.   ECHO: Normal left ventricular systolic function. No segmental wall motion abnormalities. LVEF 55%.   Recommendations  - Continue monitoring on telemetry  - Agree with Metoprolol tartrate 25 mg bid; pauses all < 3 sec and not concerning at this time, no contraindication to continuation/uptitration of BB    - [id number] 4; Now on Eliquis 5 mg bid   - As always, K > 4 Mg > 2  Patient to follow-up with Dr. Greene as an outpatient to discuss long term rhythm control strategies for atrial fibrillation. He has an appointment on 2/22/23 at 8:30am  4th floor Oncology [facility] [phone].

## 2023-01-12 NOTE — DIETITIAN INITIAL EVALUATION ADULT - OTHER INFO
A&Ox4. Pt's son at bedside provided collateral and translation. Pt eating mostly foods from home; per chart review, family providing sweets and high carbohydrate foods. BG exacerbated by Prednisone with FS >400. No reported GI issues (nausea/vomiting/diarrhea/constipation.) Denies any chewing or swallowing difficulties at this time. NKFA. Confirmed UBW ~155 pounds; denies recent wt changes.

## 2023-01-12 NOTE — DISCHARGE NOTE PROVIDER - HOSPITAL COURSE
65F with h/o Asthma vs COPD (pt now endorsing long-term smoking history), HTN, CAD (s/p 1 stent, dates unknown), DM (on Januvia, Glipizide) who presents with shortness of breath, wheezing 2/2 likely COPD exacerbation ico Influenza A+. He completed a course of tamiflu. Pulm consulted and started patient on steroid with taper regimen. Adjusted his inhalers. Course c/b new afib with short pauses on tele. EP and card consulted. Started patient on eliquis and metoprolol. Course also c/b poorly controlled DM w/ steroid induced hyperglycemia. Endo consulted to help with insulin regimen given patient is going home on a steroid taper. Patient has weaned off O2 and ambulating without issue. He is medically stable to discharge home with outpt pulm, endo and pcp f/u.

## 2023-01-12 NOTE — PHARMACOTHERAPY INTERVENTION NOTE - COMMENTS
Prior authorization for Contour Next test strips Qty 100, day supply 30 (REF#[id number]). Informed [facility].

## 2023-01-12 NOTE — DISCHARGE NOTE PROVIDER - NSDCCPCAREPLAN_GEN_ALL_CORE_FT
PRINCIPAL DISCHARGE DIAGNOSIS  Diagnosis: Acute asthma exacerbation  Assessment and Plan of Treatment: You were admitted to the hospital because you had a flare up of asthma in the setting of flu. You completed the flu treatment and seen by pulm who has adjusted your inhalers and started you on steroid with taper outpatient. Please take your inhaler at home as instructed and complete your steroid taper regimen as instructed when you go home. Please follow-up with your pulmonologist after discharge. Please make sure you follow-up with your pcp and that  you are up to date with flu vaccine, covid vaccine, pneumonia vaccine.      SECONDARY DISCHARGE DIAGNOSES  Diagnosis: Uncontrolled type 2 diabetes mellitus with hyperglycemia  Assessment and Plan of Treatment:     Diagnosis: Rapid atrial fibrillation  Assessment and Plan of Treatment: You were found to have irregular rhthym on cardiac monitoring during your stay. You were started on metoprolol to keep your heart rate control and eliquis (a blood thinner) to prevent stroke. Please continue these medications and follow-up with cardiology after discharge.     PRINCIPAL DISCHARGE DIAGNOSIS  Diagnosis: Acute asthma exacerbation  Assessment and Plan of Treatment: You were admitted to the hospital because you had a flare up of asthma in the setting of flu. You completed the flu treatment and seen by pulm who has adjusted your inhalers and started you on steroid with taper outpatient. Please take your inhaler at home as instructed and complete your steroid taper regimen as instructed below when you go home. Please follow-up with your pulmonologist after discharge. Please make sure you follow-up with your pcp and that  you are up to date with flu vaccine, covid vaccine, pneumonia vaccine.  --- Regarding taper: Pred 40 PO QD -1/11, then:  --- Pred 30mg PO QD 1/12-14, then:  --- Pred 20mg PO QD 1/15-17, then:  --- Pred 10mg PO QD 1/18-20, then stop      SECONDARY DISCHARGE DIAGNOSES  Diagnosis: Uncontrolled type 2 diabetes mellitus with hyperglycemia  Assessment and Plan of Treatment: Your hemoglobin A1C is elevated on admission and your blood glucose is severely elevated during your stay. Endocrinology saw you and recommended that you continue taking metformin but stop glyburide when you are home. Please also follow the insulin regimen below with your steroid taper:  While on prednisone 40 mg:  Lantus 40 units at bedtime  Admelog 24 units with meals  While on prednisone 30 mg  Lantus 36 units at bedtime  Admelog 20 units with meals  While on prednisone 20 mg:  Lantus 28 units  at bedtime  Admelog 16 units with meals  While on prednisone 10 mg  lantus 22 units  at bedtime  admelog 12 units with meals  OFF steroids:  lantus 18 units  at bedtime  admelog 6 units with meals  Can also provide correction scale while on steroids:  Take additional admelog premeals based on sugar:  2 Unit(s) if Glucose 151 - 200  4 Unit(s) if Glucose 201 - 250  6 Unit(s) if Glucose 251 - 300  8 Unit(s) if Glucose 301 - 350  10 Unit(s) if Glucose 351 - 400  12 Unit(s) if Glucose GREATER THAN 400, call your doctor    Diagnosis: Rapid atrial fibrillation  Assessment and Plan of Treatment: You were found to have irregular rhthym on cardiac monitoring during your stay. You were started on metoprolol to keep your heart rate control and eliquis (a blood thinner) to prevent stroke. Please continue these medications and follow-up with cardiology after discharge.     PRINCIPAL DISCHARGE DIAGNOSIS  Diagnosis: Acute asthma exacerbation  Assessment and Plan of Treatment: You were admitted to the hospital because you had a flare up of asthma in the setting of flu. You completed the flu treatment and seen by pulm who has adjusted your inhalers and started you on steroid with taper outpatient. Please take your inhaler at home as instructed and complete your steroid taper regimen as instructed below when you go home. Please follow-up with your pulmonologist after discharge. Please make sure you follow-up with your pcp and that  you are up to date with flu vaccine, covid vaccine, pneumonia vaccine.  --- Regarding taper: Pred 40 PO QD -1/11, then:  --- Pred 30mg PO QD 1/13-15, then:  --- Pred 20mg PO QD 1/16-18 then:  --- Pred 10mg PO QD 1/19-21, then stop      SECONDARY DISCHARGE DIAGNOSES  Diagnosis: Rapid atrial fibrillation  Assessment and Plan of Treatment: You were found to have irregular rhthym on cardiac monitoring during your stay. You were started on metoprolol to keep your heart rate control and eliquis (a blood thinner) to prevent stroke. Please continue these medications and follow-up with cardiology after discharge.    Diagnosis: Uncontrolled type 2 diabetes mellitus with hyperglycemia  Assessment and Plan of Treatment: Your hemoglobin A1C is elevated on admission and your blood glucose is severely elevated during your stay. Endocrinology saw you and recommended that you continue taking metformin but stop glyburide when you are home. Please also follow the insulin regimen below with your steroid taper:  While on prednisone 40 mg:  Lantus 40 units at bedtime  Admelog 24 units with meals  While on prednisone 30 mg  Lantus 36 units at bedtime  Admelog 20 units with meals  While on prednisone 20 mg:  Lantus 28 units  at bedtime  Admelog 16 units with meals  While on prednisone 10 mg  lantus 22 units  at bedtime  admelog 12 units with meals  OFF steroids:  lantus 18 units  at bedtime  admelog 6 units with meals  Can also provide correction scale while on steroids:  Take additional admelog premeals based on sugar:  2 Unit(s) if Glucose 151 - 200  4 Unit(s) if Glucose 201 - 250  6 Unit(s) if Glucose 251 - 300  8 Unit(s) if Glucose 301 - 350  10 Unit(s) if Glucose 351 - 400  12 Unit(s) if Glucose GREATER THAN 400, call your doctor     PRINCIPAL DISCHARGE DIAGNOSIS  Diagnosis: Acute asthma exacerbation  Assessment and Plan of Treatment: You were admitted to the hospital because you had a flare up of asthma in the setting of flu. You completed the flu treatment and seen by pulm who has adjusted your inhalers and started you on steroid with taper outpatient. Please take your inhaler at home as instructed and complete your steroid taper regimen as instructed below when you go home. Please follow-up with your pulmonologist after discharge for PFTs, annual CT chest. Please make sure you follow-up with your pcp and that  you are up to date with flu vaccine, covid vaccine, pneumonia vaccine.  --- Regarding taper: Pred 40 PO QD -1/11, then:  --- Pred 30mg PO QD 1/13-15, then:  --- Pred 20mg PO QD 1/16-18 then:  --- Pred 10mg PO QD 1/19-21, then stop      SECONDARY DISCHARGE DIAGNOSES  Diagnosis: Uncontrolled type 2 diabetes mellitus with hyperglycemia  Assessment and Plan of Treatment: Your hemoglobin A1C is elevated on admission and your blood glucose is severely elevated during your stay. Endocrinology saw you and recommended that you continue taking metformin but stop glyburide when you are home. Please also follow the insulin regimen below with your steroid taper:  While on prednisone 40 mg:  Lantus 40 units at bedtime  Admelog 24 units with meals  While on prednisone 30 mg  Lantus 36 units at bedtime  Admelog 20 units with meals  While on prednisone 20 mg:  Lantus 28 units  at bedtime  Admelog 16 units with meals  While on prednisone 10 mg  lantus 22 units  at bedtime  admelog 12 units with meals  OFF steroids:  lantus 18 units  at bedtime  admelog 6 units with meals  Can also provide correction scale while on steroids:  Take additional admelog premeals based on sugar:  2 Unit(s) if Glucose 151 - 200  4 Unit(s) if Glucose 201 - 250  6 Unit(s) if Glucose 251 - 300  8 Unit(s) if Glucose 301 - 350  10 Unit(s) if Glucose 351 - 400  12 Unit(s) if Glucose GREATER THAN 400, call your doctor    Diagnosis: Rapid atrial fibrillation  Assessment and Plan of Treatment: You were found to have irregular rhthym on cardiac monitoring during your stay. You were started on metoprolol to keep your heart rate control and eliquis (a blood thinner) to prevent stroke. Please continue these medications and follow-up with cardiology after discharge.     PRINCIPAL DISCHARGE DIAGNOSIS  Diagnosis: Acute asthma exacerbation  Assessment and Plan of Treatment: You were admitted to the hospital because you had a flare up of asthma in the setting of flu. You completed the flu treatment and seen by pulm who has adjusted your inhalers and started you on steroid with taper outpatient. Please take your inhaler at home as instructed and complete your steroid taper regimen as instructed below when you go home. Please follow-up with your pulmonologist after discharge for PFTs, annual CT chest. Please make sure you follow-up with your pcp and that  you are up to date with flu vaccine, covid vaccine, pneumonia vaccine.  --- Regarding taper:   --- Pred 30mg PO QD 1/13-15, then:  --- Pred 20mg PO QD 1/16-18 then:  --- Pred 10mg PO QD 1/19-21, then stop      SECONDARY DISCHARGE DIAGNOSES  Diagnosis: Rapid atrial fibrillation  Assessment and Plan of Treatment: You were found to have irregular rhthym on cardiac monitoring during your stay. You were started on metoprolol to keep your heart rate control and eliquis (a blood thinner) to prevent stroke. Please continue these medications and follow-up with cardiology after discharge.    Diagnosis: Uncontrolled type 2 diabetes mellitus with hyperglycemia  Assessment and Plan of Treatment: Your hemoglobin A1C is elevated on admission and your blood glucose is severely elevated during your stay. Endocrinology saw you and recommended that you continue taking metformin but stop glyburide when you are home. Please also follow the insulin regimen below with your steroid taper:  While on prednisone 30 mg  Levemir 40 units at bedtime  Lispro 28 units with meals  While on prednisone 20 mg:  Levemir 36 units  at bedtime  Lispro 22 units with meals  While on prednisone 10 mg  levemir 28 units  at bedtime  lispro 18 units with meals  OFF steroids:  lantus 22 units  at bedtime  Lispro 14 units with meals  If Morning sugars are less than 80, reduce levemir to 18 units  If premeal sugars are less than 100, reduce lispro before meals to 10units   Can also provide correction scale while on steroids:  Take additional admelog premeals based on sugar:  2 Unit(s) if Glucose 151 - 200  4 Unit(s) if Glucose 201 - 250  6 Unit(s) if Glucose 251 - 300  8 Unit(s) if Glucose 301 - 350  10 Unit(s) if Glucose 351 - 400  12 Unit(s) if Glucose GREATER THAN 400, call your doctor  You should check your fingerstick premeals and at bedtime. You should call your Doctor when your fingerstick is less than 70 or above 400 and or consistently above 200s

## 2023-01-12 NOTE — CONSULT NOTE ADULT - SUBJECTIVE AND OBJECTIVE BOX
HPI:  Son at bedside to translate per pt preference. Cindy ([phone])  67M w/asthma, diabetes, HTN, CAD s/p 1 stent, presents with 2 to 3 days of shortness of breath, wheezing, and cough. Reports nausea, no vomiting and left sided of chest pain.   Per son, pt sob started 2-3 days ago and he started using ProAir more with minor relief. Chest pain has no resolved.  Normally he, uses his ProAir from time to time if he gets sob, used to see pulmonologist, but overall asthma has been controlled on Symbicort and Singulair.   Pt was brought to ER via EMS on NRB, documentation shows desaturation to 80s. Pt was given 2 500cc boluses, Duoneb x5, Decadron 6mg x1 and insulin 5U for hyperglycemia.   Pt started on heparin gtt for rapid atrial fibrillation.      (06 Jan 2023 08:49)      Reason for consult:  HPI:  *** yo M/F with PMH of... Endocrinology was consulted for management of diabetes mellitus.    Patient was diagnosed with *** in ***. Has been on insulin for *** years. Has been hospitalized for hyperglycemia/DKA before.   Diabetes complications include:  Reports family history of DM in ***.    Denies blurry vision, polyuria, polydipsia, and paresthesias.    Endocrinologist:    Last HbA1c:      Home DM regimen:    Inpatient DM regimen:      PAST MEDICAL & SURGICAL HISTORY:  Type 2 diabetes mellitus      CAD (coronary artery disease)      Asthma      Essential hypertension          FAMILY HISTORY:      Social History:  no tobacco, etoh or drug use    MEDICATIONS  (STANDING):  amLODIPine   Tablet 10 milliGRAM(s) Oral daily  apixaban 5 milliGRAM(s) Oral every 12 hours  aspirin enteric coated 81 milliGRAM(s) Oral daily  atorvastatin 80 milliGRAM(s) Oral at bedtime  budesonide 160 MICROgram(s)/formoterol 4.5 MICROgram(s) Inhaler 2 Puff(s) Inhalation two times a day  cloNIDine 0.1 milliGRAM(s) Oral two times a day  coronavirus bivalent (EUA) Booster Vaccine (PFIZER) 0.3 milliLiter(s) IntraMuscular once  dextrose 5%. 1000 milliLiter(s) (100 mL/Hr) IV Continuous <Continuous>  dextrose 5%. 1000 milliLiter(s) (50 mL/Hr) IV Continuous <Continuous>  dextrose 50% Injectable 25 Gram(s) IV Push once  dextrose 50% Injectable 12.5 Gram(s) IV Push once  dextrose 50% Injectable 25 Gram(s) IV Push once  furosemide    Tablet 40 milliGRAM(s) Oral daily  glucagon  Injectable 1 milliGRAM(s) IntraMuscular once  insulin glargine Injectable (LANTUS) 36 Unit(s) SubCutaneous at bedtime  insulin lispro (ADMELOG) corrective regimen sliding scale   SubCutaneous three times a day before meals  insulin lispro (ADMELOG) corrective regimen sliding scale   SubCutaneous at bedtime  insulin lispro Injectable (ADMELOG) 22 Unit(s) SubCutaneous three times a day before meals  levalbuterol Inhalation 0.63 milliGRAM(s) Inhalation every 6 hours  losartan 100 milliGRAM(s) Oral daily  metoclopramide 10 milliGRAM(s) Oral at bedtime  metoprolol tartrate 25 milliGRAM(s) Oral two times a day  montelukast 10 milliGRAM(s) Oral daily  predniSONE   Tablet 40 milliGRAM(s) Oral daily  tiotropium 2.5 MICROgram(s) Inhaler 2 Puff(s) Inhalation daily    MEDICATIONS  (PRN):  benzonatate 100 milliGRAM(s) Oral every 8 hours PRN Cough  dextrose Oral Gel 15 Gram(s) Oral once PRN Blood Glucose LESS THAN 70 milliGRAM(s)/deciliter  guaiFENesin Oral Liquid (Sugar-Free) 100 milliGRAM(s) Oral every 6 hours PRN Cough      Allergies    No Known Allergies    Intolerances        Review of Systems:  Constitutional: No fever, good appetite/po intake  Eyes: No blurry vision, diplopia  Neuro: No tremors  HEENT: No pain  Cardiovascular: No chest pain, palpitations  Respiratory: No SOB, no cough  GI: No nausea, vomiting,   : No dysuria, hematuria  Skin: no rash  Psych: no depression  Endocrine: no polyuria, polydipsia  Hem/lymph: no swelling  Osteoporosis: no fractures    ALL OTHER SYSTEMS REVIEWED AND NEGATIVE    PHYSICAL EXAM:  VITALS: T(C): 36.7 (01-12-23 @ 05:30)  T(F): 98 (01-12-23 @ 05:30), Max: 98.3 (01-11-23 @ 21:11)  HR: 98 (01-12-23 @ 05:30) (81 - 102)  BP: 126/70 (01-12-23 @ 05:30) (102/67 - 141/70)  RR:  (17 - 17)  SpO2:  (95% - 98%)  Wt(kg): --  GENERAL: NAD, well-groomed, well-developed  EYES: No proptosis, extraocular movements intact,  no lid lag, anicteric  HEENT:  Atraumatic, Normocephalic, moist mucous membranes  THYROID: Normal size, no palpable nodules, no thyromegaly  RESPIRATORY: Clear to auscultation bilaterally; No rales, rhonchi, wheezing, or rubs  CARDIOVASCULAR: Regular rate and rhythm; No murmurs; no peripheral edema  GI: Soft, nontender, non distended, normal bowel sounds  SKIN: Dry, intact, No rashes or lesions  EXTREMITIES: No foot ulcers, distal pedal pulses intact bilaterally  NEURO: sensation intact, no tremors  PSYCH: reactive affect, euthymic mood  CUSHING'S SIGNS: no striae or visible bruising                              12.7   20.05 )-----------( 511      ( 12 Jan 2023 07:30 )             37.9       01-12    139  |  105  |  24<H>  ----------------------------<  151<H>  3.4<L>   |  23  |  1.20    eGFR: 65    Ca    8.4      01-12  Mg     2.10     01-12  Phos  4.5     01-12        Thyroid Function Tests:      01-08 Chol 170 Direct LDL -- LDL calculated 110<H> HDL 34<L> Trig 131    Radiology:       A/P: yo M/F with PMH of *** presenting with ***admitted for ***. Endocrinology was consulted for management of diabetes mellitus.    #Type 2 Diabetes Mellitus  - HbA1c     ; home regimen:  - Recommend [ ] units of lantus QHS  - Recommend [ ] units of lispro TIDQAC  - Recommend low vs. moderate lispro correction scale TIDQAC and QHS  - Please check FSG before meals and QHS, or q6h while NPO  - RD consult  - hypoglycemia orderset prn  - extensively discussed importance of glycemic control to prevent micro- and macrovascular complications  - discussed importance of weight loss, exercise, and changing diet   - reviewed symptoms and management of hypoglycemia  - reviewed basics of insulin administration and pharmacokinetics, glucometer monitoring, as well as glycemic goals for outpatient setting  - Discharge planning:    #Hypertension  - BP goal <130/80  - Management as per primary team    #Hyperlipidemia  - check fasting lipid panel  - statin:    Antonietta Rojas MD  Attending Physician   Department of Endocrinology, Diabetes and Metabolism   Pager: [phone] or [employer] Teams for 01-12-23 @ 10:22.    If before 9AM or after 5PM, or on weekends/holidays, please call the Endocrine answering service for assistance ([phone]).  For nonurgent matters, please email [email or web address] for assistance.     Please note that a different provider may be following this patient each day.          HPI:  Son at bedside to translate per pt preference. Cindy ([phone])  67M w/asthma, diabetes, HTN, CAD s/p 1 stent, presents with 2 to 3 days of shortness of breath, wheezing, and cough. Reports nausea, no vomiting and left sided of chest pain.   Per son, pt sob started 2-3 days ago and he started using ProAir more with minor relief. Chest pain has no resolved.  Normally he, uses his ProAir from time to time if he gets sob, used to see pulmonologist, but overall asthma has been controlled on Symbicort and Singulair.   Pt was brought to ER via EMS on NRB, documentation shows desaturation to 80s. Pt was given 2 500cc boluses, Duoneb x5, Decadron 6mg x1 and insulin 5U for hyperglycemia.   Pt started on heparin gtt for rapid atrial fibrillation.      (06 Jan 2023 08:49)    ENDOCRINE CONSULT  HPI:  66 yo M with uncontrolled DM2, asthma, HTN, CAD presenting with SOB, COPD exacerbation, consulted for DM and steroid induced hyperglycemia.    Patient was diagnosed with T2DM 5 years ago, never on insulin. Son helps with meds. Sugars are usually in 200s. No complications. Follows with PCP.  Denies family history of DM.  Denies blurry vision, polyuria, polydipsia, and paresthesias.  Severe hyperglycemia since 1/6/23 despite escalating basal bolus doses.  s/p methylpred 40 mg (last dose 1/10), now on prednisone 40 mg daily (slightly lower steroid equivalent but still very hyperglycemic, no hypos), planned taper as outpatient. Taper listed below.    Endocrinologist: none    Last HbA1c: 8.5%      Home DM regimen:  metformin 1000 mg bid  glyburide 5 mg      PAST MEDICAL & SURGICAL HISTORY:  Type 2 diabetes mellitus      CAD (coronary artery disease)      Asthma      Essential hypertension          FAMILY HISTORY:      Social History:  no tobacco, etoh or drug use    MEDICATIONS  (STANDING):  amLODIPine   Tablet 10 milliGRAM(s) Oral daily  apixaban 5 milliGRAM(s) Oral every 12 hours  aspirin enteric coated 81 milliGRAM(s) Oral daily  atorvastatin 80 milliGRAM(s) Oral at bedtime  budesonide 160 MICROgram(s)/formoterol 4.5 MICROgram(s) Inhaler 2 Puff(s) Inhalation two times a day  cloNIDine 0.1 milliGRAM(s) Oral two times a day  coronavirus bivalent (EUA) Booster Vaccine (PFIZER) 0.3 milliLiter(s) IntraMuscular once  dextrose 5%. 1000 milliLiter(s) (100 mL/Hr) IV Continuous <Continuous>  dextrose 5%. 1000 milliLiter(s) (50 mL/Hr) IV Continuous <Continuous>  dextrose 50% Injectable 25 Gram(s) IV Push once  dextrose 50% Injectable 12.5 Gram(s) IV Push once  dextrose 50% Injectable 25 Gram(s) IV Push once  furosemide    Tablet 40 milliGRAM(s) Oral daily  glucagon  Injectable 1 milliGRAM(s) IntraMuscular once  insulin glargine Injectable (LANTUS) 36 Unit(s) SubCutaneous at bedtime  insulin lispro (ADMELOG) corrective regimen sliding scale   SubCutaneous three times a day before meals  insulin lispro (ADMELOG) corrective regimen sliding scale   SubCutaneous at bedtime  insulin lispro Injectable (ADMELOG) 22 Unit(s) SubCutaneous three times a day before meals  levalbuterol Inhalation 0.63 milliGRAM(s) Inhalation every 6 hours  losartan 100 milliGRAM(s) Oral daily  metoclopramide 10 milliGRAM(s) Oral at bedtime  metoprolol tartrate 25 milliGRAM(s) Oral two times a day  montelukast 10 milliGRAM(s) Oral daily  predniSONE   Tablet 40 milliGRAM(s) Oral daily  tiotropium 2.5 MICROgram(s) Inhaler 2 Puff(s) Inhalation daily    MEDICATIONS  (PRN):  benzonatate 100 milliGRAM(s) Oral every 8 hours PRN Cough  dextrose Oral Gel 15 Gram(s) Oral once PRN Blood Glucose LESS THAN 70 milliGRAM(s)/deciliter  guaiFENesin Oral Liquid (Sugar-Free) 100 milliGRAM(s) Oral every 6 hours PRN Cough      Allergies    No Known Allergies    Intolerances        Review of Systems:  Constitutional: No fever, good appetite/po intake  Eyes: No blurry vision, diplopia  Neuro: No tremors  HEENT: No pain  Cardiovascular: No chest pain, palpitations  Respiratory: No SOB, no cough  GI: No nausea, vomiting,   : No dysuria, hematuria  Skin: no rash  Psych: no depression  Endocrine: no polyuria, polydipsia  Hem/lymph: no swelling  Osteoporosis: no fractures    ALL OTHER SYSTEMS REVIEWED AND NEGATIVE    PHYSICAL EXAM:  VITALS: T(C): 36.7 (01-12-23 @ 05:30)  T(F): 98 (01-12-23 @ 05:30), Max: 98.3 (01-11-23 @ 21:11)  HR: 98 (01-12-23 @ 05:30) (81 - 102)  BP: 126/70 (01-12-23 @ 05:30) (102/67 - 141/70)  RR:  (17 - 17)  SpO2:  (95% - 98%)  Wt(kg): --  GENERAL: NAD, well-groomed, well-developed  EYES: No proptosis, extraocular movements intact,  no lid lag, anicteric  HEENT:  Atraumatic, Normocephalic, moist mucous membranes  THYROID: Normal size, no palpable nodules, no thyromegaly  RESPIRATORY: Clear to auscultation bilaterally; No rales, rhonchi, wheezing, or rubs  CARDIOVASCULAR: Regular rate and rhythm; No murmurs; no peripheral edema  GI: Soft, nontender, non distended, normal bowel sounds  SKIN: Dry, intact, No rashes or lesions  EXTREMITIES: No foot ulcers, distal pedal pulses intact bilaterally  NEURO: sensation intact, no tremors  PSYCH: reactive affect, euthymic mood  CUSHING'S SIGNS: no striae or visible bruising                              12.7   20.05 )-----------( 511      ( 12 Jan 2023 07:30 )             37.9       01-12    139  |  105  |  24<H>  ----------------------------<  151<H>  3.4<L>   |  23  |  1.20    eGFR: 65    Ca    8.4      01-12  Mg     2.10     01-12  Phos  4.5     01-12        Thyroid Function Tests:      01-08 Chol 170 Direct LDL -- LDL calculated 110<H> HDL 34<L> Trig 131    Radiology:

## 2023-01-12 NOTE — PROGRESS NOTE ADULT - ASSESSMENT
SYNTHESIS  #COPD Exacerbation 2/2 Influenza A+  Mr. Carreon is a 65F with h/o Asthma vs COPD (pt now endorsing long-term smoking history), HTN, CAD (s/p 1 stent, dates unknown), DM (on Januvia, Glipizide) who presents with shortness of breath, wheezing 2/2 likely COPD exacerbation ico Influenza A+. His wheeze has improved.     #Hyperglycemia #T2DM  Pt with persistently elevated BGLs likely as a consequence of steroid-therapy & underlying insulin resistance. Will have to monitor these numbers as Basal-Bolus regimen increased as we decrease his steroid load. Will trial lower therapeutic dose of steroid and assess respiratory tolerability given his persistent, significant hyperglycemia.     SUMMARY OF RECOMMENDATIONS  - Recommend CONTINUE Prednisone 40mg PO QD (Total Steroids 01/06-)  --- Regarding taper: consider Pred 40 PO QD -1/11, then:  --- Pred 30mg PO QD 1/12-14, then:  --- Pred 20mg PO QD 1/15-17, then:  --- Pred 10mg PO QD 1/18-20, then stop  --- Will need close OP pulm follow-up, please let us know prior to discharge so we can e-mail for an appointment.  - Recommend CONTINUE Budesonide 140ug/Formoterol 4.5ug 2 puffs INH BID (ICS/LABA)  - Recommend CONTINUE Tiotropium 17ug 2 puffs INH QD (LAMA)  - Consider Levalbuterol (NEYMAR) Q6H ~PRN~   - Please include need for annual low-dose CT, Pneumonia Vaccine, Pulm Follow-Up/PFTs in patient's discharge paperwork     Attending Attestation (For Attendings USE Only)...

## 2023-01-12 NOTE — DISCHARGE NOTE PROVIDER - CARE PROVIDER_API CALL
Cristin Greene)  Cardiovascular Disease; Internal Medicine  [address] [place] [address]  Phone: [phone]  Fax: [phone]  Follow Up Time: 1 month    Antonietta Rojas)  EndocrinologyMetabDiabetes; Internal Medicine  [address] [facility], [address]  [place] [address]  Phone: [phone]  Fax: [phone]  Follow Up Time: 1 month    Thang Shelley  INTERNAL MEDICINE  [address] [place] [address], [place] [address]  Phone: [phone]  Fax: [phone]  Follow Up Time: 1 week

## 2023-01-12 NOTE — PROGRESS NOTE ADULT - SUBJECTIVE AND OBJECTIVE BOX
Patient's son at bedside to translate.   Patient feeling better. No shortness of breath at rest. Less wheezing.   Denies chest pain, palpitations or dizziness.   Ambulating without difficulty.     Vital Signs Last 24 Hrs  T(C): 36.7 (12 Jan 2023 05:30), Max: 36.8 (11 Jan 2023 21:11)  T(F): 98 (12 Jan 2023 05:30), Max: 98.3 (11 Jan 2023 21:11)  HR: 98 (12 Jan 2023 05:30) (80 - 102)  BP: 126/70 (12 Jan 2023 05:30) (94/62 - 141/70)  BP(mean): --  RR: 17 (12 Jan 2023 05:30) (17 - 18)  SpO2: 96% (12 Jan 2023 05:30) (95% - 98%)    Parameters below as of 12 Jan 2023 05:30  Patient On (Oxygen Delivery Method): room air      Telemetry: atrial fibrillation with ventricular rates 70-90's.  Pauses < 2 seconds. +PVC's.   MEDICATIONS  (STANDING):  amLODIPine   Tablet 10 milliGRAM(s) Oral daily  apixaban 5 milliGRAM(s) Oral every 12 hours  aspirin enteric coated 81 milliGRAM(s) Oral daily  atorvastatin 80 milliGRAM(s) Oral at bedtime  budesonide 160 MICROgram(s)/formoterol 4.5 MICROgram(s) Inhaler 2 Puff(s) Inhalation two times a day  cloNIDine 0.1 milliGRAM(s) Oral two times a day  coronavirus bivalent (EUA) Booster Vaccine (PFIZER) 0.3 milliLiter(s) IntraMuscular once  dextrose 5%. 1000 milliLiter(s) (100 mL/Hr) IV Continuous <Continuous>  dextrose 5%. 1000 milliLiter(s) (50 mL/Hr) IV Continuous <Continuous>  dextrose 50% Injectable 25 Gram(s) IV Push once  dextrose 50% Injectable 12.5 Gram(s) IV Push once  dextrose 50% Injectable 25 Gram(s) IV Push once  furosemide    Tablet 40 milliGRAM(s) Oral daily  glucagon  Injectable 1 milliGRAM(s) IntraMuscular once  insulin glargine Injectable (LANTUS) 36 Unit(s) SubCutaneous at bedtime  insulin lispro (ADMELOG) corrective regimen sliding scale   SubCutaneous three times a day before meals  insulin lispro (ADMELOG) corrective regimen sliding scale   SubCutaneous at bedtime  insulin lispro Injectable (ADMELOG) 22 Unit(s) SubCutaneous three times a day before meals  levalbuterol Inhalation 0.63 milliGRAM(s) Inhalation every 6 hours  losartan 100 milliGRAM(s) Oral daily  metoclopramide 10 milliGRAM(s) Oral at bedtime  metoprolol tartrate 25 milliGRAM(s) Oral two times a day  montelukast 10 milliGRAM(s) Oral daily  predniSONE   Tablet 40 milliGRAM(s) Oral daily  tiotropium 2.5 MICROgram(s) Inhaler 2 Puff(s) Inhalation daily    MEDICATIONS  (PRN):  benzonatate 100 milliGRAM(s) Oral every 8 hours PRN Cough  dextrose Oral Gel 15 Gram(s) Oral once PRN Blood Glucose LESS THAN 70 milliGRAM(s)/deciliter  guaiFENesin Oral Liquid (Sugar-Free) 100 milliGRAM(s) Oral every 6 hours PRN Cough          Physical exam:   Gen- well developed well nourished NAD  Resp- decreased breath sounds. + wheezing. No rales or rhonchi  CV- S1 and S2 irregular irregular. No murmurs, gallops or rubs  ABD- soft nontender + bowel sounds  EXT- no edema. Extremities warm and dry.  Neuro- grossly nonfocal                            12.7   20.05 )-----------( 511      ( 12 Jan 2023 07:30 )             37.9     PTT - ( 10 Angel 2023 15:00 )  PTT:75.1 sec  01-12    139  |  105  |  24<H>  ----------------------------<  151<H>  3.4<L>   |  23  |  1.20    Ca    8.4      12 Jan 2023 07:30  Phos  4.5     01-12  Mg     2.10     01-12      < from: Transthoracic Echocardiogram (01.10.23 @ 10:17) >  Patient name: MD MICHELINE  YOB: 1955   Age: 67 (M)   MR#: [id number]  Study Date: 1/10/2023  Location: [id number]: Edelmira Nguyen RDCS  Study quality: Technically good  Referring Physician: Arsalan Maria MD  Blood Pressure: 127/85 mmHg  Height: 165 cm  Weight: 70 kg  BSA: 1.8 m2  ------------------------------------------------------------------------  PROCEDURE: Transthoracic echocardiogram with 2-D, M-Mode  and complete spectral and color flow Doppler.  INDICATION: Abnormal electrocardiogram (ECG) (EKG) (R94.31)  ------------------------------------------------------------------------  DIMENSIONS:  Dimensions:     Normal Values:  LA:     3.7 cm    2.0 - 4.0 cm  Ao:     3.3 cm    2.0 - 3.8 cm  SEPTUM: 1.0 cm    0.6 - 1.2 cm  PWT:    1.0 cm    0.6 - 1.1 cm  LVIDd:  5.4 cm    3.0 - 5.6 cm  LVIDs:    ---     1.8 - 4.0 cm  Derived Variables:  LVMI: 117 g/m2  RWT: 0.37  Ejection Fraction (Visual Estimate): 55 %  ------------------------------------------------------------------------  OBSERVATIONS:  Mitral Valve: Mitral annular calcification, otherwise  normal mitral valve. Mild mitral regurgitation.  Aortic Root: Normal aortic root.  Aortic Valve: Calcified trileaflet aortic valve with normal  opening. Mild-moderate aortic regurgitation.  Left Atrium: Normal left atrium.  LA volume index = 28  cc/m2.  Left Ventricle: Normal left ventricular systolic function.  No segmental wall motion abnormalities. Eccentric left  ventricular hypertrophy (dilated left ventricle withnormal  relative wall thickness).  Right Heart: Normal right atrium. Normal right ventricular  size and function. Normal tricuspid valve.   Minimal  tricuspid regurgitation. Normal pulmonic valve. Minimal  pulmonic regurgitation.  Pericardium/PleuraNormal pericardium with no pericardial  effusion.  < from: Transthoracic Echocardiogram (01.10.23 @ 10:17) >  Hemodynamic: Estimated right ventricular systolic pressure  equals 30 mm Hg, assuming right atrial pressure equals 10  mm Hg, consistent with normal pulmonary pressures.  ------------------------------------------------------------------------  CONCLUSIONS:  1. Mitral annular calcification, otherwise normal mitral  valve. Mild mitral regurgitation.  2. Calcified trileaflet aortic valve with normal opening.  3. Eccentric left ventricular hypertrophy(dilated left  ventricle with normal relative wall thickness).  4. Normal left ventricular systolic function. No segmental  wall motion abnormalities.  ------------------------------------------------------------------------  Confirmed on  1/10/2023 - 17:23:22 by DAVIS Nice  ------------------------------------------------------------------------      >

## 2023-01-12 NOTE — DIETITIAN INITIAL EVALUATION ADULT - PERTINENT MEDS FT
MEDICATIONS  (STANDING):  amLODIPine   Tablet 10 milliGRAM(s) Oral daily  apixaban 5 milliGRAM(s) Oral every 12 hours  aspirin enteric coated 81 milliGRAM(s) Oral daily  atorvastatin 80 milliGRAM(s) Oral at bedtime  budesonide 160 MICROgram(s)/formoterol 4.5 MICROgram(s) Inhaler 2 Puff(s) Inhalation two times a day  cloNIDine 0.1 milliGRAM(s) Oral two times a day  coronavirus bivalent (EUA) Booster Vaccine (PFIZER) 0.3 milliLiter(s) IntraMuscular once  dextrose 5%. 1000 milliLiter(s) (100 mL/Hr) IV Continuous <Continuous>  dextrose 5%. 1000 milliLiter(s) (50 mL/Hr) IV Continuous <Continuous>  dextrose 50% Injectable 25 Gram(s) IV Push once  dextrose 50% Injectable 12.5 Gram(s) IV Push once  dextrose 50% Injectable 25 Gram(s) IV Push once  furosemide    Tablet 40 milliGRAM(s) Oral daily  glucagon  Injectable 1 milliGRAM(s) IntraMuscular once  insulin glargine Injectable (LANTUS) 36 Unit(s) SubCutaneous at bedtime  insulin lispro (ADMELOG) corrective regimen sliding scale   SubCutaneous three times a day before meals  insulin lispro (ADMELOG) corrective regimen sliding scale   SubCutaneous at bedtime  insulin lispro Injectable (ADMELOG) 22 Unit(s) SubCutaneous three times a day before meals  levalbuterol Inhalation 0.63 milliGRAM(s) Inhalation every 6 hours  losartan 100 milliGRAM(s) Oral daily  metoclopramide 10 milliGRAM(s) Oral at bedtime  metoprolol tartrate 25 milliGRAM(s) Oral two times a day  montelukast 10 milliGRAM(s) Oral daily  predniSONE   Tablet 40 milliGRAM(s) Oral daily  tiotropium 2.5 MICROgram(s) Inhaler 2 Puff(s) Inhalation daily    MEDICATIONS  (PRN):  benzonatate 100 milliGRAM(s) Oral every 8 hours PRN Cough  dextrose Oral Gel 15 Gram(s) Oral once PRN Blood Glucose LESS THAN 70 milliGRAM(s)/deciliter  guaiFENesin Oral Liquid (Sugar-Free) 100 milliGRAM(s) Oral every 6 hours PRN Cough

## 2023-01-12 NOTE — DIETITIAN INITIAL EVALUATION ADULT - PERTINENT LABORATORY DATA
01-12    139  |  105  |  24<H>  ----------------------------<  151<H>  3.4<L>   |  23  |  1.20    Ca    8.4      12 Jan 2023 07:30  Phos  4.5     01-12  Mg     2.10     01-12    POCT Blood Glucose.: 227 mg/dL (01-12-23 @ 08:02)  A1C with Estimated Average Glucose Result: 8.4 % (01-07-23 @ 06:31)

## 2023-01-12 NOTE — PROGRESS NOTE ADULT - SUBJECTIVE AND OBJECTIVE BOX
PROGRESS NOTE:     Patient is a 67y old  Male who presents with a chief complaint of sob (12 Jan 2023 14:30)      SUBJECTIVE / OVERNIGHT EVENTS: no acute event overnight. Reports feeling well. No SOB, chest pain. No complaints. Wants to go home.    ADDITIONAL REVIEW OF SYSTEMS:    MEDICATIONS  (STANDING):  amLODIPine   Tablet 10 milliGRAM(s) Oral daily  apixaban 5 milliGRAM(s) Oral every 12 hours  aspirin enteric coated 81 milliGRAM(s) Oral daily  atorvastatin 80 milliGRAM(s) Oral at bedtime  budesonide 160 MICROgram(s)/formoterol 4.5 MICROgram(s) Inhaler 2 Puff(s) Inhalation two times a day  cloNIDine 0.1 milliGRAM(s) Oral two times a day  coronavirus bivalent (EUA) Booster Vaccine (PFIZER) 0.3 milliLiter(s) IntraMuscular once  dextrose 5%. 1000 milliLiter(s) (100 mL/Hr) IV Continuous <Continuous>  dextrose 5%. 1000 milliLiter(s) (50 mL/Hr) IV Continuous <Continuous>  dextrose 50% Injectable 25 Gram(s) IV Push once  dextrose 50% Injectable 12.5 Gram(s) IV Push once  dextrose 50% Injectable 25 Gram(s) IV Push once  furosemide    Tablet 40 milliGRAM(s) Oral daily  glucagon  Injectable 1 milliGRAM(s) IntraMuscular once  insulin glargine Injectable (LANTUS) 40 Unit(s) SubCutaneous at bedtime  insulin lispro (ADMELOG) corrective regimen sliding scale   SubCutaneous three times a day before meals  insulin lispro (ADMELOG) corrective regimen sliding scale   SubCutaneous at bedtime  insulin lispro Injectable (ADMELOG) 24 Unit(s) SubCutaneous three times a day before meals  levalbuterol Inhalation 0.63 milliGRAM(s) Inhalation every 6 hours  losartan 100 milliGRAM(s) Oral daily  metoclopramide 10 milliGRAM(s) Oral at bedtime  metoprolol tartrate 25 milliGRAM(s) Oral two times a day  montelukast 10 milliGRAM(s) Oral daily  predniSONE   Tablet 40 milliGRAM(s) Oral daily  tiotropium 2.5 MICROgram(s) Inhaler 2 Puff(s) Inhalation daily    MEDICATIONS  (PRN):  benzonatate 100 milliGRAM(s) Oral every 8 hours PRN Cough  dextrose Oral Gel 15 Gram(s) Oral once PRN Blood Glucose LESS THAN 70 milliGRAM(s)/deciliter  guaiFENesin Oral Liquid (Sugar-Free) 100 milliGRAM(s) Oral every 6 hours PRN Cough      CAPILLARY BLOOD GLUCOSE      POCT Blood Glucose.: 356 mg/dL (12 Jan 2023 12:00)  POCT Blood Glucose.: 227 mg/dL (12 Jan 2023 08:02)  POCT Blood Glucose.: 266 mg/dL (11 Jan 2023 21:19)  POCT Blood Glucose.: 416 mg/dL (11 Jan 2023 15:30)    I&O's Summary      PHYSICAL EXAM:  Vital Signs Last 24 Hrs  T(C): 36.8 (12 Jan 2023 13:30), Max: 36.8 (11 Jan 2023 21:11)  T(F): 98.2 (12 Jan 2023 13:30), Max: 98.3 (11 Jan 2023 21:11)  HR: 78 (12 Jan 2023 13:30) (78 - 102)  BP: 127/66 (12 Jan 2023 13:30) (102/67 - 141/70)  BP(mean): --  RR: 16 (12 Jan 2023 13:30) (16 - 18)  SpO2: 99% (12 Jan 2023 13:30) (95% - 99%)    Parameters below as of 12 Jan 2023 13:30  Patient On (Oxygen Delivery Method): room air        CONSTITUTIONAL: NAD, well-developed  RESPIRATORY: Normal respiratory effort; scatted coarse breath sound  CARDIOVASCULAR: Regular rate and rhythm, normal S1 and S2, no murmur/rub/gallop; No lower extremity edema  ABDOMEN: Nontender to palpation, normoactive bowel sounds, no rebound/guarding  MUSCLOSKELETAL: no clubbing or cyanosis of digits; no joint swelling or tenderness to palpation  SKIN: no rash, erythema, lesion  PSYCH: A+O to person, place, and time; affect appropriate    LABS:                        12.7   20.05 )-----------( 511      ( 12 Jan 2023 07:30 )             37.9     01-12    139  |  105  |  24<H>  ----------------------------<  151<H>  3.4<L>   |  23  |  1.20    Ca    8.4      12 Jan 2023 07:30  Phos  4.5     01-12  Mg     2.10     01-12                  RADIOLOGY & ADDITIONAL TESTS:  Results Reviewed:   Imaging Personally Reviewed:  Electrocardiogram Personally Reviewed:    COORDINATION OF CARE:  Care Discussed with Consultants/Other Providers [Y/N]:  Prior or Outpatient Records Reviewed [Y/N]:

## 2023-01-12 NOTE — DISCHARGE NOTE PROVIDER - NSFOLLOWUPCLINICS_GEN_ALL_ED_FT
[facility] Pulmonolgy and Sleep Medicine  Pulmonology  [address] [place] [address], [place] [address]  Phone: [phone]  Fax:

## 2023-01-12 NOTE — PROGRESS NOTE ADULT - SUBJECTIVE AND OBJECTIVE BOX
Patient is a 67y old  Male who presents with a chief complaint of sob (12 Jan 2023 15:29)      SUBJECTIVE / OVERNIGHT EVENTS:    Patient seen and examined at bedside. No acute events overnight.    T(F): 98.4 (01-12 @ 17:00), Max: 98.4 (01-12 @ 17:00)  HR: 80 (01-12 @ 17:00) (78 - 102)  BP: 137/64 (01-12 @ 17:00) (102/67 - 141/70)  RR: 17 (01-12 @ 17:00) (16 - 18)  SpO2: 99% (01-12 @ 17:00) (95% - 99%)    I&O's Summary      MEDICATIONS  (STANDING):  amLODIPine   Tablet 10 milliGRAM(s) Oral daily  apixaban 5 milliGRAM(s) Oral every 12 hours  aspirin enteric coated 81 milliGRAM(s) Oral daily  atorvastatin 80 milliGRAM(s) Oral at bedtime  budesonide 160 MICROgram(s)/formoterol 4.5 MICROgram(s) Inhaler 2 Puff(s) Inhalation two times a day  cloNIDine 0.1 milliGRAM(s) Oral two times a day  coronavirus bivalent (EUA) Booster Vaccine (PFIZER) 0.3 milliLiter(s) IntraMuscular once  dextrose 5%. 1000 milliLiter(s) (50 mL/Hr) IV Continuous <Continuous>  dextrose 5%. 1000 milliLiter(s) (100 mL/Hr) IV Continuous <Continuous>  dextrose 50% Injectable 25 Gram(s) IV Push once  dextrose 50% Injectable 25 Gram(s) IV Push once  dextrose 50% Injectable 12.5 Gram(s) IV Push once  furosemide    Tablet 40 milliGRAM(s) Oral daily  glucagon  Injectable 1 milliGRAM(s) IntraMuscular once  insulin glargine Injectable (LANTUS) 40 Unit(s) SubCutaneous at bedtime  insulin lispro (ADMELOG) corrective regimen sliding scale   SubCutaneous three times a day before meals  insulin lispro (ADMELOG) corrective regimen sliding scale   SubCutaneous at bedtime  insulin lispro Injectable (ADMELOG) 24 Unit(s) SubCutaneous three times a day before meals  levalbuterol Inhalation 0.63 milliGRAM(s) Inhalation every 6 hours  losartan 100 milliGRAM(s) Oral daily  metoclopramide 10 milliGRAM(s) Oral at bedtime  metoprolol tartrate 25 milliGRAM(s) Oral two times a day  montelukast 10 milliGRAM(s) Oral daily  tiotropium 2.5 MICROgram(s) Inhaler 2 Puff(s) Inhalation daily    MEDICATIONS  (PRN):  benzonatate 100 milliGRAM(s) Oral every 8 hours PRN Cough  dextrose Oral Gel 15 Gram(s) Oral once PRN Blood Glucose LESS THAN 70 milliGRAM(s)/deciliter  guaiFENesin Oral Liquid (Sugar-Free) 100 milliGRAM(s) Oral every 6 hours PRN Cough      Constitutional: denies fevers, chills, night sweats, weight loss  HEENT: denies visual changes, cough  Cardiovascular: denies palpitations, chest pain, edema  Respiratory: denies SOB, wheezing  Gastrointestinal: denies N/V/D, abdominal pain, hematochezia, melena  : denies dysuria, urinary urgency, increased frequency  MSK: denies muscle weakness, joint pain  Skin: denies new rashes or masses  Heme: denies bleeding, bruising  Neuro: denies headache, weakness    PHYSICAL EXAM:   GEN: Age appropriate, resting comfortably in bed, no acute distress, non toxic appearing, speaking in complete sentences.   HEENT: Conjunctiva and sclera normal  PULM: Lungs CTAB, no wheezes, rales, rhonchi  CV: RRR, S1S2, no MRG  MSK: no stiffness or joint effusions  Abdominal: Soft, nontender to palpation, non-distended, +BS  Extremities: No edema or cyanosis  NEURO: AAOx3  Psych: normal affect, normal behavior  Skin: No rashes, lesions    LABS:  Labs personally reviewed.                        12.7   20.05 )-----------( 511      ( 12 Jan 2023 07:30 )             37.9     Hgb Trend: 12.7<--, 12.2<--, 12.9<--, 13.7<--, 11.1<--  01-12    139  |  105  |  24<H>  ----------------------------<  151<H>  3.4<L>   |  23  |  1.20    Ca    8.4      12 Jan 2023 07:30  Phos  4.5     01-12  Mg     2.10     01-12      Creatinine Trend: 1.20<--, 1.18<--, 1.14<--, 1.02<--, 0.98<--, 0.94<--          Tre [facility]  Pulmonary and Critical Care Fellow    PGY-5 Pager: [phone]  [phone]  [facility] Pulmonary Spectra [phone]   Night Float:

## 2023-01-12 NOTE — CONSULT NOTE ADULT - ASSESSMENT
A/P: 66 yo M with uncontrolled DM2, asthma, HTN, CAD presenting with SOB, COPD exacerbation, consulted for DM and steroid induced hyperglycemia. Endocrine team consulted for uncontrolled diabetes. Patient is high risk with high level decision making due to uncontrolled diabetes which places patient at high risk for cardiovascular and cerebrovascular events. Patient with lability of glucose requiring close monitoring and insulin adjustments.    #Type 2 Diabetes Mellitus  - HbA1c 8.5%; home regimen: glyburide 5 mg + metformin 1000 mg bid  - Recommend lantus 40 units QHS  - Recommend admelog 24 units with meals  - Recommend moderate ademlog correction scale TIDQAC and QHS  - Please check FSG before meals and QHS, or q6h while NPO  - RD consult  - hypoglycemia orderset prn  - discussed importance of glycemic control to prevent micro- and macrovascular complications  - reviewed symptoms and management of hypoglycemia  - reviewed basics of insulin administration and pharmacokinetics, glucometer monitoring, as well as glycemic goals for outpatient setting  - Discharge planning:  basal-bolus insulin + metformin 1000 mg bid. STOP glyburide    Current taper plan  --- Regarding taper: Pred 40 PO QD -1/11, then:  --- Pred 30mg PO QD 1/12-14, then:  --- Pred 20mg PO QD 1/15-17, then:  --- Pred 10mg PO QD 1/18-20, then stop    For discharge, tentative plan:  While on prednisone 40 mg:  Lantus 40 units QHS  Admelog 24 units with meals    While on prednisone 30 mg  Lantus 36 units  Admelog 20 units    While on prednisone 20 mg:  Lantus 28 units   Admelog 16 units    While on prednisone 10 mg  lantus 22 units  admelog 12 units    OFF steroids:  lantus 18 units  admelog 6 units    Can also provide correction scale while on steroids:  Take additional admelog premeals based on sugar:  2 Unit(s) if Glucose 151 - 200  4 Unit(s) if Glucose 201 - 250  6 Unit(s) if Glucose 251 - 300  8 Unit(s) if Glucose 301 - 350  10 Unit(s) if Glucose 351 - 400  12 Unit(s) if Glucose GREATER THAN 400, call your doctor    Patient needs insulin pen teaching    DISCHARGE PLANNING:  - Make sure patient knows how to inject insulin and check fingersticks with glucometer (ask bedside RN for teaching; provider to RN order placed)  - At home, Patient should check FSBG premeals and bedtime. Pt should call their doctor when FSBG <70 or above >400 and or consistently above 200s as changes in the regimen will have to be made.  -Advised that pt should call PMD for DM related questions or concerns until pt is seen by CDE or Endocrine team.    DISCHARGE RX:  - Glucometer (ACCU_CHECK pratibha Connect, Ascensia Contour Next EZ or One, Freestyle Charlotte LITE or OneTouch Verio IQ)  - Glucometer test strips and lancets  (make sure compatible with glucometer): Dispense #100 (or #200), use as directed (3 refills)  - BD kenji 4mm pen needles: dispense #100, use as directed (3 refills)  - Alcohol pads: dispense #100, use as directed (3 refills)    [facility]:  [address] [place] [address]. [address]. [place] [address]  Tel: [phone]    PATIENT ACCESS SERVICES  [phone]  For all [facility] Endocrine practices phone numbers and locations throughout [facility], and [place].    will request appt    #Hypertension  - BP goal <130/80  - Management as per primary team    #Hyperlipidemia  - check fasting lipid panel    paged team    Antonietta Rojas MD  Attending Physician   Department of Endocrinology, Diabetes and Metabolism   [facility] Teams for 01-12-23 @ 10:22.    If before 9AM or after 5PM, or on weekends/holidays, please call the Endocrine answering service for assistance ([phone]).  For nonurgent matters, please email [email or web address] for assistance.     Please note that a different provider may be following this patient each day.

## 2023-01-12 NOTE — DIETITIAN INITIAL EVALUATION ADULT - REASON FOR ADMISSION
Asthma with acute exacerbation  67M w/asthma, diabetes, HTN, CAD s/p 1 stent, presents with 2 to 3 days of shortness of breath, wheezing, and cough, admitted with severe sepsis 2/2 influenza A, hypoxic respiratory failure, and rapid atrial fibrillation

## 2023-01-12 NOTE — DISCHARGE NOTE PROVIDER - PROVIDER TOKENS
PROVIDER:[TOKEN:[[id number]],FOLLOWUP:[1 month]],PROVIDER:[TOKEN:[[id number]],FOLLOWUP:[1 month]],PROVIDER:[TOKEN:[[id number]],FOLLOWUP:[1 week]]

## 2023-01-12 NOTE — DIETITIAN INITIAL EVALUATION ADULT - PERSON TAUGHT/METHOD
Discussed carbohydrate sources, carbohydrate portions, protein sources, mixed meals, and nutrition label reading. Stressed importance of balanced meals with adequate protein and fiber. Pt was informed of current A1c, goal A1c, and goal fingerstick range. Advised to avoid sugary sweets and beverages while pt is ordered for prednisone./verbal instruction/son instructed

## 2023-01-12 NOTE — PROGRESS NOTE ADULT - PROBLEM SELECTOR PLAN 6
Home meds: Glyburide and Metformin, Reported A1c ~8.5   - C/b hyperglycemia, likely 2/2 steroid use   - Endo on board. Dc insulin regimen appreciated   -Son reports gastroparesis, c/w Reglan

## 2023-01-13 ENCOUNTER — TRANSCRIPTION ENCOUNTER (OUTPATIENT)
Age: 68
End: 2023-01-13

## 2023-01-13 VITALS
RESPIRATION RATE: 18 BRPM | OXYGEN SATURATION: 96 % | HEART RATE: 62 BPM | SYSTOLIC BLOOD PRESSURE: 113 MMHG | DIASTOLIC BLOOD PRESSURE: 53 MMHG | TEMPERATURE: 98 F

## 2023-01-13 LAB
ANION GAP SERPL CALC-SCNC: 12 MMOL/L — SIGNIFICANT CHANGE UP (ref 7–14)
BUN SERPL-MCNC: 27 MG/DL — HIGH (ref 7–23)
CALCIUM SERPL-MCNC: 8 MG/DL — LOW (ref 8.4–10.5)
CHLORIDE SERPL-SCNC: 103 MMOL/L — SIGNIFICANT CHANGE UP (ref 98–107)
CO2 SERPL-SCNC: 24 MMOL/L — SIGNIFICANT CHANGE UP (ref 22–31)
CREAT SERPL-MCNC: 1.24 MG/DL — SIGNIFICANT CHANGE UP (ref 0.5–1.3)
EGFR: 63 ML/MIN/1.73M2 — SIGNIFICANT CHANGE UP
GLUCOSE BLDC GLUCOMTR-MCNC: 227 MG/DL — HIGH (ref 70–99)
GLUCOSE BLDC GLUCOMTR-MCNC: 231 MG/DL — HIGH (ref 70–99)
GLUCOSE BLDC GLUCOMTR-MCNC: 319 MG/DL — HIGH (ref 70–99)
GLUCOSE SERPL-MCNC: 343 MG/DL — HIGH (ref 70–99)
HCT VFR BLD CALC: 33 % — LOW (ref 39–50)
HGB BLD-MCNC: 11.1 G/DL — LOW (ref 13–17)
MAGNESIUM SERPL-MCNC: 2.1 MG/DL — SIGNIFICANT CHANGE UP (ref 1.6–2.6)
MCHC RBC-ENTMCNC: 29.1 PG — SIGNIFICANT CHANGE UP (ref 27–34)
MCHC RBC-ENTMCNC: 33.6 GM/DL — SIGNIFICANT CHANGE UP (ref 32–36)
MCV RBC AUTO: 86.6 FL — SIGNIFICANT CHANGE UP (ref 80–100)
NRBC # BLD: 0 /100 WBCS — SIGNIFICANT CHANGE UP (ref 0–0)
NRBC # FLD: 0 K/UL — SIGNIFICANT CHANGE UP (ref 0–0)
PHOSPHATE SERPL-MCNC: 3.9 MG/DL — SIGNIFICANT CHANGE UP (ref 2.5–4.5)
PLATELET # BLD AUTO: 421 K/UL — HIGH (ref 150–400)
POTASSIUM SERPL-MCNC: 3.2 MMOL/L — LOW (ref 3.5–5.3)
POTASSIUM SERPL-SCNC: 3.2 MMOL/L — LOW (ref 3.5–5.3)
RBC # BLD: 3.81 M/UL — LOW (ref 4.2–5.8)
RBC # FLD: 16.1 % — HIGH (ref 10.3–14.5)
SODIUM SERPL-SCNC: 139 MMOL/L — SIGNIFICANT CHANGE UP (ref 135–145)
WBC # BLD: 20.13 K/UL — HIGH (ref 3.8–10.5)
WBC # FLD AUTO: 20.13 K/UL — HIGH (ref 3.8–10.5)

## 2023-01-13 PROCEDURE — 99233 SBSQ HOSP IP/OBS HIGH 50: CPT

## 2023-01-13 PROCEDURE — 99239 HOSP IP/OBS DSCHRG MGMT >30: CPT

## 2023-01-13 RX ORDER — INSULIN DETEMIR 100/ML (3)
30 INSULIN PEN (ML) SUBCUTANEOUS
Qty: 5 | Refills: 0
Start: 2023-01-13

## 2023-01-13 RX ORDER — TIOTROPIUM BROMIDE 18 UG/1
2 CAPSULE ORAL; RESPIRATORY (INHALATION)
Qty: 1 | Refills: 0
Start: 2023-01-13 | End: 2023-02-11

## 2023-01-13 RX ORDER — ASPIRIN/CALCIUM CARB/MAGNESIUM 324 MG
1 TABLET ORAL
Qty: 0 | Refills: 0 | DISCHARGE
Start: 2023-01-13

## 2023-01-13 RX ORDER — INSULIN LISPRO 100/ML
10 VIAL (ML) SUBCUTANEOUS
Qty: 5 | Refills: 0
Start: 2023-01-13

## 2023-01-13 RX ORDER — INSULIN LISPRO 100/ML
28 VIAL (ML) SUBCUTANEOUS
Refills: 0 | Status: DISCONTINUED | OUTPATIENT
Start: 2023-01-13 | End: 2023-01-13

## 2023-01-13 RX ORDER — POTASSIUM CHLORIDE 20 MEQ
40 PACKET (EA) ORAL EVERY 4 HOURS
Refills: 0 | Status: DISCONTINUED | OUTPATIENT
Start: 2023-01-13 | End: 2023-01-13

## 2023-01-13 RX ORDER — APIXABAN 2.5 MG/1
1 TABLET, FILM COATED ORAL
Qty: 60 | Refills: 0
Start: 2023-01-13 | End: 2023-02-11

## 2023-01-13 RX ORDER — FUROSEMIDE 40 MG
1 TABLET ORAL
Qty: 30 | Refills: 0
Start: 2023-01-13 | End: 2023-02-11

## 2023-01-13 RX ORDER — METOPROLOL TARTRATE 50 MG
1 TABLET ORAL
Qty: 60 | Refills: 0
Start: 2023-01-13 | End: 2023-02-11

## 2023-01-13 RX ORDER — ATORVASTATIN CALCIUM 80 MG/1
1 TABLET, FILM COATED ORAL
Qty: 30 | Refills: 0
Start: 2023-01-13 | End: 2023-02-11

## 2023-01-13 RX ADMIN — TIOTROPIUM BROMIDE 2 PUFF(S): 18 CAPSULE ORAL; RESPIRATORY (INHALATION) at 09:02

## 2023-01-13 RX ADMIN — Medication 0.1 MILLIGRAM(S): at 05:51

## 2023-01-13 RX ADMIN — BUDESONIDE AND FORMOTEROL FUMARATE DIHYDRATE 2 PUFF(S): 160; 4.5 AEROSOL RESPIRATORY (INHALATION) at 09:02

## 2023-01-13 RX ADMIN — Medication 25 MILLIGRAM(S): at 05:49

## 2023-01-13 RX ADMIN — Medication 8: at 13:06

## 2023-01-13 RX ADMIN — Medication 81 MILLIGRAM(S): at 13:07

## 2023-01-13 RX ADMIN — Medication 30 MILLIGRAM(S): at 05:49

## 2023-01-13 RX ADMIN — Medication 28 UNIT(S): at 13:06

## 2023-01-13 RX ADMIN — Medication 40 MILLIGRAM(S): at 05:50

## 2023-01-13 RX ADMIN — LEVALBUTEROL 0.63 MILLIGRAM(S): 1.25 SOLUTION, CONCENTRATE RESPIRATORY (INHALATION) at 09:20

## 2023-01-13 RX ADMIN — LEVALBUTEROL 0.63 MILLIGRAM(S): 1.25 SOLUTION, CONCENTRATE RESPIRATORY (INHALATION) at 04:04

## 2023-01-13 RX ADMIN — Medication 4: at 09:03

## 2023-01-13 RX ADMIN — LOSARTAN POTASSIUM 100 MILLIGRAM(S): 100 TABLET, FILM COATED ORAL at 05:49

## 2023-01-13 RX ADMIN — AMLODIPINE BESYLATE 10 MILLIGRAM(S): 2.5 TABLET ORAL at 05:49

## 2023-01-13 RX ADMIN — Medication 40 MILLIEQUIVALENT(S): at 13:07

## 2023-01-13 RX ADMIN — APIXABAN 5 MILLIGRAM(S): 2.5 TABLET, FILM COATED ORAL at 05:49

## 2023-01-13 RX ADMIN — Medication 24 UNIT(S): at 09:01

## 2023-01-13 RX ADMIN — MONTELUKAST 10 MILLIGRAM(S): 4 TABLET, CHEWABLE ORAL at 13:07

## 2023-01-13 NOTE — PROGRESS NOTE ADULT - PROBLEM SELECTOR PLAN 7
s/p 1 stent  -not on statin or bb  -c/w lipitor here  -outpt followup with PMD
Home meds: Glyburide and Metformin, Reported A1c ~8.5   - C/b hyperglycemia, likely 2/2 steroid use   - Increase Lantus to 20 units QD   - Increase Lispro to 8 units w/ meals   - Mod SSI   [ ] DECREASE Insulin once steroids discontinued   -Son reports gastroparesis, c/w Reglan
Home meds: Glyburide and Metformin, Reported A1c ~8.5   - C/b hyperglycemia, likely 2/2 steroid use   - Increase Lantus to 25 units QD   - Increase Lispro to 12 units w/ meals   - Mod SSI   [ ] DECREASE Insulin once steroids discontinued   -Son reports gastroparesis, c/w Reglan
s/p 1 stent  -not on statin or bb  -c/w lipitor here  -outpt followup with PMD

## 2023-01-13 NOTE — PROGRESS NOTE ADULT - PROBLEM SELECTOR PROBLEM 7
CAD (coronary artery disease)
Uncontrolled type 2 diabetes mellitus with hyperglycemia
CAD (coronary artery disease)
Uncontrolled type 2 diabetes mellitus with hyperglycemia
CAD (coronary artery disease)

## 2023-01-13 NOTE — DISCHARGE NOTE NURSING/CASE MANAGEMENT/SOCIAL WORK - NSDCPEEMAIL_GEN_ALL_CORE
[facility] email [email or web address]

## 2023-01-13 NOTE — PROGRESS NOTE ADULT - PROVIDER SPECIALTY LIST ADULT
Cardiology
ENT
Electrophysiology
Hospitalist
Pulmonology
Cardiology
Electrophysiology
Pulmonology
Electrophysiology
Pulmonology
Endocrinology
Hospitalist

## 2023-01-13 NOTE — PROGRESS NOTE ADULT - PROBLEM SELECTOR PLAN 5
Plan as above
Plan as above
BP elevated to 200s on admission. Spiked to 190's on 1/7 however 2/2 resp distress and BP improved after nebulizer   - c/w home Clonidine 0.1mg BID, Losartan 100mg qD and Amlodipine 10mg qD w/hold parameters
BP elevated to 200s on admission. Spiked to 190's on 1/7 however 2/2 resp distress and BP improved after nebulizer   - Resume home Clonidine 0.1mg BID, Losartan 100mg qD and Amlodipine 10mg qD w/hold parameters
BP elevated to 200s on admission. Spiked to 190's on 1/7 however 2/2 resp distress and BP improved after nebulizer   - c/w home Clonidine 0.1mg BID, Losartan 100mg qD and Amlodipine 10mg qD w/hold parameters

## 2023-01-13 NOTE — PROGRESS NOTE ADULT - PROBLEM SELECTOR PROBLEM 8
Prophylactic measure
CAD (coronary artery disease)
Prophylactic measure
CAD (coronary artery disease)
Prophylactic measure

## 2023-01-13 NOTE — DISCHARGE NOTE NURSING/CASE MANAGEMENT/SOCIAL WORK - NSCORESITESY/N_GEN_A_CORE_RD
Progress Notes by Joceline Miner CNP at 3/7/2019  2:10 PM     Author: Joceline Miner CNP Service: -- Author Type: Nurse Practitioner    Filed: 3/7/2019  3:26 PM Encounter Date: 3/7/2019 Status: Signed    : Joceline Miner CNP (Nurse Practitioner)           Click to link to [facility]     [facility] HEART [facility] ELECTROPHYSIOLOGY NOTE      Assessment/Recommendations   Assessment/Plan:    1.  Permanent atrial fibrillation and flutter: Ventricular response continues to be elevated, though it is not clear whether or not she has been taking her medications.  She seems off today and is having trouble remembering things.  Continue metoprolol succinate 100 mg daily with digoxin 125 mcg daily.  Increase diltiazem to 240 mg daily to be taken at night.  We will check heart rates with a remote pacemaker interrogation in 2 weeks.  If ventricular rates continue to be elevated, recommend AV node ablation as she already has a pacemaker.  We discussed the ablation procedure, risks, and expected recovery.  She is not excited about undergoing a procedure, but would like to get off of some of the medications, so is amenable to proceeding if that is our recommendation.  Check BMP and digoxin level today.    She has a [id number] score of 4 for age >75, female gender, and coronary artery disease; she is on warfarin for stroke prophylaxis.  INR today = 2.4.  Continue warfarin 2 mg on Tuesdays and Fridays with 4 mg ROW.  Recheck INR in 3 weeks.    2.  Sick sinus syndrome status post dual-chamber pacemaker implant:  The pacemaker was interrogated and is functioning appropriately.  The battery shows estimated longevity of 8.9 years.  Ventricular lead sensing, impedance, and pacing thresholds is stable and within normal limits.      3.  Coronary artery disease: Denies anginal symptoms.  Discontinue simvastatin due to interaction with diltiazem.  Start atorvastatin 40 mg daily.    She was given a current medication list  "including the increase in diltiazem dose.  She was instructed to compare this list with what she is taking at home and to call if there are any differences.    Remote pacemaker check in 2 weeks.  Follow-up in 3 months, or sooner as needed.     History of Present Illness    Ms. Tiffanie Sharma is a very pleasant 79 y.o. female who comes in today for EP device and arrhythmia follow-up.  She has a long-standing history of atrial fibrillation.  She failed antiarrhythmic therapy with dofetilide and sotalol.  She had asymptomatic recurrence of persistent A. fib and was switched to a rate control strategy.  She has been long-standing persistent in atrial fibrillation or flutter since February 2015, now considered permanent.  She has had some ongoing issues with rapid ventricular response despite metoprolol succinate 100 mg daily and digoxin 125 mcg daily for which diltiazem 120 mg daily was added.  She also has a history of tachybradycardia syndrome s/p dual-chamber pacemaker implant on 11/25/2013, GERD, hyperlipidemia, breast cancer status post lumpectomy and radiation, spontaneous pneumothorax, COPD, and coronary artery disease with heavy calcifications noted on CT.  She has been on supplemental oxygen since 2018.    Tiffanie states that she feels like she is in a \"fog\".  She denies chest discomfort, palpitations, abdominal fullness/bloating or peripheral edema, shortness of breath at rest, paroxysmal nocturnal dyspnea, orthopnea, lightheadedness, dizziness, pre-syncope, or syncope.  She does not know her medications today and cannot remember if or what she has been taking.    Cardiographics (personally reviewed):  Pacemaker Interrogation ([facility]):  Pacing mode: VVIR   Presenting rhythm: AF--160s  Underlying rhythm: AF -160s  V-paced 7.8%.  Battery: estimated longevity 8.6 years.  Ventricular lead: Stable with good sensing, impedance, and pacing threshold  Arrhythmias: Permanent AF.  2 NSVT, EGM " shows AF-RVR  Histograms:  tendency for RVR, VR > 110 30-40% VR >120 bpm 15-20%       Physical Examination Review of Systems   Vitals:    03/07/19 1328   BP: 126/82   Pulse: 66   Resp: 18     Body mass index is 23.99 kg/m .  Wt Readings from Last 3 Encounters:   03/07/19 128 lb (58.1 kg)   03/05/19 128 lb (58.1 kg)   01/24/19 132 lb (59.9 kg)     General Appearance:   Alert, cooperative and in no acute distress.   HEENT: Atraumatic, normocephalic.  No scleral icterus, normal conjunctivae, EOM intact; mucous membranes pink and moist.     Chest: Chest symmetric, spine straight.  Left subclavian pacemaker site with no sign of infection or tissue thinning.   Lungs:   Respirations unlabored: Lungs are diminished but clear to auscultation.  Wearing supplemental oxygen via nasal cannula.   Cardiovascular:   Normal first and second heart sounds with no murmurs, rubs, or gallops.  Irregularly irregular rate and rhythm.  Radial and posterior tibial pulses are intact, no edema.   Abdomen:  Soft, nontender, nondistended, bowel sounds present   Extremities: No cyanosis or clubbing   Musculoskeletal: Moves all extremities   Skin: Warm, dry, intact.     Neurologic: Mood and affect are appropriate, alert and oriented to person, place, time, and situation    General: WNL  Eyes: WNL  Ears/Nose/Throat: WNL  Lungs: WNL  Heart: WNL  Stomach: WNL  Bladder: WNL  Muscle/Joints: WNL  Skin: WNL  Nervous System: WNL  Mental Health: WNL     Blood: WNL     Medical History  Surgical History Family History Social History   Past Medical History:   Diagnosis Date   ? Atrial fibrillation (H)    ? Atrial flutter (H)     Created by Conversion    ? Breast cancer (H) 2009   ? Breast cyst 1980    at drs office benign   ? COPD (chronic obstructive pulmonary disease) (H) 5/7/2015   ? Coronary Artery Disease     Created by Conversion  Replacement Utility updated for latest IMO load   ? Esophageal reflux     Created by Conversion    ? Hx of radiation therapy     ? Hyperlipidemia     Created by Conversion    ? Pneumothorax    ? Sick Sinus Syndrome     Created by Conversion     Past Surgical History:   Procedure Laterality Date   ? BREAST BIOPSY Right 2009    cancer   ? BREAST CYST ASPIRATION     ? BREAST CYST EXCISION     ? SC EXCISE BREAST CYST      Description: Breast Surgery Lumpectomy;  Recorded: 12/15/2013;    Family History   Problem Relation Age of Onset   ? No Medical Problems Mother    ? No Medical Problems Father    ? No Medical Problems Sister    ? No Medical Problems Daughter    ? No Medical Problems Maternal Grandmother    ? No Medical Problems Maternal Grandfather    ? No Medical Problems Paternal Grandmother    ? No Medical Problems Paternal Grandfather    ? No Medical Problems Maternal Aunt    ? No Medical Problems Paternal Aunt    ? BRCA 1/2 Neg Hx    ? Breast cancer Neg Hx    ? Cancer Neg Hx    ? Colon cancer Neg Hx    ? Endometrial cancer Neg Hx    ? Ovarian cancer Neg Hx     Social History     Socioeconomic History   ? Marital status:      Spouse name: Not on file   ? Number of children: Not on file   ? Years of education: Not on file   ? Highest education level: Not on file   Occupational History   ? Not on file   Social Needs   ? Financial resource strain: Not on file   ? Food insecurity:     Worry: Not on file     Inability: Not on file   ? Transportation needs:     Medical: Not on file     Non-medical: Not on file   Tobacco Use   ? Smoking status: Former Smoker     Last attempt to quit: 2010     Years since quittin.3   ? Smokeless tobacco: Never Used   Substance and Sexual Activity   ? Alcohol use: No   ? Drug use: No   ? Sexual activity: Not on file   Lifestyle   ? Physical activity:     Days per week: Not on file     Minutes per session: Not on file   ? Stress: Not on file   Relationships   ? Social connections:     Talks on phone: Not on file     Gets together: Not on file     Attends Islam service: Not on file     Active  member of club or organization: Not on file     Attends meetings of clubs or organizations: Not on file     Relationship status: Not on file   ? Intimate partner violence:     Fear of current or ex partner: Not on file     Emotionally abused: Not on file     Physically abused: Not on file     Forced sexual activity: Not on file   Other Topics Concern   ? Not on file   Social History Narrative   ? Not on file          Medications  Allergies   Current Outpatient Medications   Medication Sig Dispense Refill   ? alendronate (FOSAMAX) 70 MG tablet Take 70 mg by mouth every 7 days.      ? aspirin 81 MG EC tablet Take 81 mg by mouth daily.     ? calcium-vitamin D (CALCIUM-VITAMIN D) 500 mg(1,250mg) -200 unit per tablet Take 1 tablet by mouth 2 (two) times a day with meals.     ? clobetasol (TEMOVATE) 0.05 % cream Apply 1 application topically 2 (two) times a day as needed.      ? warfarin (COUMADIN/JANTOVEN) 2 MG tablet Take 2 mg tue, thur and Fri and 4 mg all other days. 60 tablet 0   ? atorvastatin (LIPITOR) 40 MG tablet Take 1 tablet (40 mg total) by mouth at bedtime. 30 tablet 6   ? cholecalciferol, vitamin D3, 1,000 unit tablet Take 1,000 Units by mouth daily.     ? citalopram (CELEXA) 10 MG tablet Take 10 mg by mouth daily.      ? digoxin (LANOXIN) 125 mcg tablet TAKE ONE TABLET BY MOUTH DAILY 90 tablet 0   ? diltiazem (CARDIZEM CD) 120 MG 24 hr capsule Take 2 capsules (240 mg total) by mouth daily. 60 capsule 11   ? gabapentin (NEURONTIN) 800 MG tablet Take 800 mg by mouth 2 (two) times a day.   1   ? GLUC INIGUEZ/CHONDRO INIGUEZ A/VIT C/MN (GLUCOSAMINE 1500 COMPLEX ORAL) Take 1,500 mg by mouth daily.      ? metoprolol succinate (TOPROL-XL) 100 MG 24 hr tablet Take 1 tablet (100 mg total) by mouth daily. 90 tablet 3   ? multivitamin with minerals tablet Take 1 tablet by mouth daily.     ? PROAIR HFA 90 mcg/actuation inhaler Inhale 2 puffs every 6 (six) hours as needed.      ? ranitidine (ZANTAC) 150 MG tablet Take 1 tablet  (150 mg total) by mouth at bedtime. 30 tablet 12   ? traZODone (DESYREL) 50 MG tablet Take 50 mg by mouth bedtime.      ? triamcinolone (KENALOG) 0.5 % cream Apply topically 3 (three) times a day.       No current facility-administered medications for this visit.       Allergies   Allergen Reactions   ? Acetaminophen-Codeine    ? Advair Diskus [Fluticasone-Salmeterol] Other (See Comments)     Pt c/o muscle and bone paing.  Cramping, hoarseness, and dry mouth.   ? Flovent [Fluticasone] Other (See Comments)     Pt c/o muscle and bone pain.  Cramping, hoarseness, and dry mouth.   ? Ibuprofen    ? Nsaids (Non-Steroidal Anti-Inflammatory Drug)    ? Symbicort [Budesonide-Formoterol] Other (See Comments)     Pt c/o muscle and bone pain.  Cramping, hoarseness, and dry mouth      Medical, surgical, family, social history, and medications were all reviewed and updated as necessary.   Lab Results    Chemistry CBC Other   Lab Results   Component Value Date    CREATININE 0.87 05/31/2017    BUN 19 05/31/2017     05/31/2017    K 4.5 05/31/2017     (H) 05/31/2017    CO2 27 05/31/2017     Creatinine (mg/dL)   Date Value   05/31/2017 0.87   12/05/2016 0.76   12/04/2016 0.72   12/03/2016 0.72    Lab Results   Component Value Date    WBC 6.0 05/31/2017    HGB 13.5 05/31/2017    HCT 43.4 05/31/2017    MCV 92 05/31/2017     05/31/2017    Lab Results   Component Value Date    INR 2.4 03/07/2019    INR 1.5 02/27/2019    INR 5.00 (!) 01/24/2019     Lab Results   Component Value Date    TSH 1.42 05/31/2017            30 minutes were spent face to face in this visit with more than 50% spent discussing diagnoses as listed above, counseling, and coordination of care.    This note has been dictated using voice recognition software. Any grammatical, typographical, or context distortions are unintentional and inherent to the software.    Joceline Miner, [facility]   Electrophysiology                         No

## 2023-01-13 NOTE — PROGRESS NOTE ADULT - PROBLEM SELECTOR PROBLEM 4
Acute asthma exacerbation
Acute asthma exacerbation
Influenza A
Acute asthma exacerbation
Influenza A
Acute asthma exacerbation
Acute asthma exacerbation

## 2023-01-13 NOTE — DISCHARGE NOTE NURSING/CASE MANAGEMENT/SOCIAL WORK - NSDCPEFALRISK_GEN_ALL_CORE
For information on Fall & Injury Prevention, visit: [email or web address] OR  [email or web address] OR  https://www.cdc.gov/steadi/patient.html

## 2023-01-13 NOTE — PROGRESS NOTE ADULT - ASSESSMENT
A/P: 66 yo M with uncontrolled DM2, asthma, HTN, CAD presenting with SOB, COPD exacerbation, consulted for DM and steroid induced hyperglycemia. Endocrine team consulted for uncontrolled diabetes. Patient is high risk with high level decision making due to uncontrolled diabetes which places patient at high risk for cardiovascular and cerebrovascular events. Patient with lability of glucose requiring close monitoring and insulin adjustments.    #Type 2 Diabetes Mellitus  - HbA1c 8.5%; home regimen: glyburide 5 mg + metformin 1000 mg bid  While admitted  - Recommend lantus 40 units QHS  - Recommend admelog 28 units with meals  - Recommend moderate ademlog correction scale TIDQAC and QHS  - Please check FSG before meals and QHS, or q6h while NPO  - RD consult  - hypoglycemia orderset prn  - discussed importance of glycemic control to prevent micro- and macrovascular complications  - reviewed symptoms and management of hypoglycemia  - reviewed basics of insulin administration and pharmacokinetics, glucometer monitoring, as well as glycemic goals for outpatient setting  - Discharge planning:  basal-bolus insulin + metformin 1000 mg bid. STOP glyburide    Current taper plan  --- Pred 30mg x 3 days  --- Pred 20mg x 3 days   --- Pred 10mg x3 days    For discharge, please print the following information for the patient  ***  While on prednisone 30 mg  Levemir 40 units at bedtme  Admelog 28 units before meals    While on prednisone 20 mg:  Levemir 36 units at bedtime  Admelog 22 units before meals    While on prednisone 10 mg  Levemir 28 units at bedtime  admelog 18 units before meals    OFF prednisone:  Levemir 22 units  admelog 14 units  If off steroids and:  -morning sugars are less than 80, reduce levemir to 18 units  -premeal sugars are less than 100, reduce admelog before meals to 10 units    Can also provide correction scale while on steroids:  Take additional admelog before meals based on sugar if elevated:  2 Unit(s) if Glucose 151 - 200  4 Unit(s) if Glucose 201 - 250  6 Unit(s) if Glucose 251 - 300  8 Unit(s) if Glucose 301 - 350  10 Unit(s) if Glucose 351 - 400  12 Unit(s) if Glucose GREATER THAN 400, call your doctor    DISCHARGE PLANNING:  - Make sure patient knows how to inject insulin and check fingersticks with glucometer (ask bedside RN for teaching; provider to RN order placed)  - At home, Patient should check FSBG premeals and bedtime. Pt should call their doctor when FSBG <70 or above >400 and or consistently above 200s as changes in the regimen will have to be made.  -Advised that pt should call PMD for DM related questions or concerns until pt is seen by CDE or Endocrine team.    DISCHARGE RX:  - Glucometer (ACCU_CHECK pratibha Connect, Ascensia Contour Next EZ or One, Freestyle [place] LITE or OneTouch Verio IQ)  - Glucometer test strips and lancets  (make sure compatible with glucometer): Dispense #100 (or #200), use as directed (3 refills)  - BD kenji 4mm pen needles: dispense #100, use as directed (3 refills)  - Alcohol pads: dispense #100, use as directed (3 refills)    Requested appt:  [facility]  Endocrinology Clinic  Tuesdays 9:20am-12:20pm  [address] [place] [address]  [phone]    #Hypertension  - BP goal <130/80  - Management as per primary team    #Hyperlipidemia  - check fasting lipid panel as outpatient      Antonietta Rojas MD  Attending Physician   Department of Endocrinology, Diabetes and Metabolism   [employer] Teams for 01-12-23 @ 10:22.    If before 9AM or after 5PM, or on weekends/holidays, please call the Endocrine answering service for assistance ([phone]).  For nonurgent matters, please email [email or web address] for assistance.     Please note that a different provider may be following this patient each day.

## 2023-01-13 NOTE — PROGRESS NOTE ADULT - PROBLEM SELECTOR PROBLEM 1
Rapid atrial fibrillation
Rapid atrial fibrillation
Hyperlipidemia
Rapid atrial fibrillation
Rapid atrial fibrillation
Severe sepsis
Rapid atrial fibrillation
Severe sepsis

## 2023-01-13 NOTE — PROGRESS NOTE ADULT - SUBJECTIVE AND OBJECTIVE BOX
PROGRESS NOTE:     Patient is a 67y old  Male who presents with a chief complaint of sob (13 Jan 2023 11:41)      SUBJECTIVE / OVERNIGHT EVENTS: no acute event overnight. Reports feeling fine. No complaints. Wants to go home.    ADDITIONAL REVIEW OF SYSTEMS:    MEDICATIONS  (STANDING):  amLODIPine   Tablet 10 milliGRAM(s) Oral daily  apixaban 5 milliGRAM(s) Oral every 12 hours  aspirin enteric coated 81 milliGRAM(s) Oral daily  atorvastatin 80 milliGRAM(s) Oral at bedtime  budesonide 160 MICROgram(s)/formoterol 4.5 MICROgram(s) Inhaler 2 Puff(s) Inhalation two times a day  cloNIDine 0.1 milliGRAM(s) Oral two times a day  coronavirus bivalent (EUA) Booster Vaccine (PFIZER) 0.3 milliLiter(s) IntraMuscular once  dextrose 5%. 1000 milliLiter(s) (100 mL/Hr) IV Continuous <Continuous>  dextrose 5%. 1000 milliLiter(s) (50 mL/Hr) IV Continuous <Continuous>  dextrose 50% Injectable 25 Gram(s) IV Push once  dextrose 50% Injectable 12.5 Gram(s) IV Push once  dextrose 50% Injectable 25 Gram(s) IV Push once  furosemide    Tablet 40 milliGRAM(s) Oral daily  glucagon  Injectable 1 milliGRAM(s) IntraMuscular once  insulin glargine Injectable (LANTUS) 40 Unit(s) SubCutaneous at bedtime  insulin lispro (ADMELOG) corrective regimen sliding scale   SubCutaneous three times a day before meals  insulin lispro (ADMELOG) corrective regimen sliding scale   SubCutaneous at bedtime  insulin lispro Injectable (ADMELOG) 28 Unit(s) SubCutaneous three times a day before meals  levalbuterol Inhalation 0.63 milliGRAM(s) Inhalation every 6 hours  losartan 100 milliGRAM(s) Oral daily  metoclopramide 10 milliGRAM(s) Oral at bedtime  metoprolol tartrate 25 milliGRAM(s) Oral two times a day  montelukast 10 milliGRAM(s) Oral daily  potassium chloride    Tablet ER 40 milliEquivalent(s) Oral every 4 hours  predniSONE   Tablet 30 milliGRAM(s) Oral daily  tiotropium 2.5 MICROgram(s) Inhaler 2 Puff(s) Inhalation daily    MEDICATIONS  (PRN):  benzonatate 100 milliGRAM(s) Oral every 8 hours PRN Cough  dextrose Oral Gel 15 Gram(s) Oral once PRN Blood Glucose LESS THAN 70 milliGRAM(s)/deciliter  guaiFENesin Oral Liquid (Sugar-Free) 100 milliGRAM(s) Oral every 6 hours PRN Cough      CAPILLARY BLOOD GLUCOSE      POCT Blood Glucose.: 319 mg/dL (13 Jan 2023 12:15)  POCT Blood Glucose.: 227 mg/dL (13 Jan 2023 08:52)  POCT Blood Glucose.: 231 mg/dL (13 Jan 2023 08:28)  POCT Blood Glucose.: 170 mg/dL (12 Jan 2023 21:53)  POCT Blood Glucose.: 224 mg/dL (12 Jan 2023 18:33)  POCT Blood Glucose.: 211 mg/dL (12 Jan 2023 16:12)    I&O's Summary    13 Jan 2023 07:01  -  13 Jan 2023 14:55  --------------------------------------------------------  IN: 120 mL / OUT: 0 mL / NET: 120 mL        PHYSICAL EXAM:  Vital Signs Last 24 Hrs  T(C): 36.5 (13 Jan 2023 11:33), Max: 36.9 (12 Jan 2023 17:00)  T(F): 97.7 (13 Jan 2023 11:33), Max: 98.4 (12 Jan 2023 17:00)  HR: 62 (13 Jan 2023 11:33) (62 - 80)  BP: 113/53 (13 Jan 2023 11:33) (113/53 - 137/64)  BP(mean): --  RR: 18 (13 Jan 2023 11:33) (17 - 18)  SpO2: 96% (13 Jan 2023 11:33) (96% - 99%)    Parameters below as of 13 Jan 2023 11:33  Patient On (Oxygen Delivery Method): room air        CONSTITUTIONAL: NAD, well-developed  RESPIRATORY: Normal respiratory effort; lungs are clear to auscultation bilaterally  CARDIOVASCULAR: Regular rate and rhythm, normal S1 and S2, no murmur/rub/gallop; No lower extremity edema  ABDOMEN: Nontender to palpation, normoactive bowel sounds, no rebound/guarding  MUSCLOSKELETAL: no clubbing or cyanosis of digits; no joint swelling or tenderness to palpation  SKIN: no rash, erythema, lesion  PSYCH: A+O to person, place, and time; affect appropriate    LABS:                        11.1   20.13 )-----------( 421      ( 13 Jan 2023 04:59 )             33.0     01-13    139  |  103  |  27<H>  ----------------------------<  343<H>  3.2<L>   |  24  |  1.24    Ca    8.0<L>      13 Jan 2023 04:59  Phos  3.9     01-13  Mg     2.10     01-13                  RADIOLOGY & ADDITIONAL TESTS:  Results Reviewed:   Imaging Personally Reviewed:  Electrocardiogram Personally Reviewed:    COORDINATION OF CARE:  Care Discussed with Consultants/Other Providers [Y/N]:  Prior or Outpatient Records Reviewed [Y/N]:

## 2023-01-13 NOTE — DISCHARGE NOTE NURSING/CASE MANAGEMENT/SOCIAL WORK - NSDCPEWEB_GEN_ALL_CORE
A task has been sent to the office to arrange for Calixto catheter removal and trial of void for this patient, we will review his final pathology in 2 weeks, he will be due for postoperative PSA in roughly 6 weeks
See updated plan in encounter from Dr Porsche Corcoran 6/9/20201 
Void trial scheduled for 6/14 at [facility]  Patient remains inpatient at this time  Will monitor for discharge and follow up with patient at that time 
[facility] website --- [email or web address] --- [email or web address]

## 2023-01-13 NOTE — PROGRESS NOTE ADULT - PROBLEM SELECTOR PLAN 3
-C/w Tamiflu
Per chart, pt hypoxic to 80s with EMS, was on NRB. Was also on HFNC earlier? but now 98% on room air, but still tachypneic w/diffuse wheezing. 2/2 Asthma exacerbation 2/2 Flu   -CXR reviewed   -C/w IV Solumedrol 40mg BID, Singulair, Symbicort and Duoneb  -Will need ambulatory sat  -Pulm following
-s/p Tamiflu
-C/w Tamiflu
-C/w Tamiflu
-s/p Tamiflu
Per chart, pt hypoxic to 80s with EMS, was on NRB. Was also on HFNC earlier? but now 98% on room air, but still tachypneic w/diffuse wheezing. 2/2 Asthma exacerbation 2/2 Flu   -CXR reviewed   -C/w IV Solumedrol 40mg BID, Singulair, Symbicort and Duoneb  -Will need ambulatory sat  -Pulm following  - Lasix diuresis today as well

## 2023-01-13 NOTE — PROGRESS NOTE ADULT - PROBLEM SELECTOR PROBLEM 2
Acute respiratory failure with hypoxia
Acute respiratory failure with hypoxia
Essential hypertension
Acute respiratory failure with hypoxia
Rapid atrial fibrillation
Rapid atrial fibrillation
Acute respiratory failure with hypoxia
Acute respiratory failure with hypoxia

## 2023-01-13 NOTE — PROGRESS NOTE ADULT - SUBJECTIVE AND OBJECTIVE BOX
HPI:  66 yo M with uncontrolled DM2, asthma, HTN, CAD presenting with SOB, COPD exacerbation, consulted for DM and steroid induced hyperglycemia. Endocrine team consulted for uncontrolled diabetes.   interval hx:  glucoses improving but still elevated  now on prednisone 30 mg, planned for discharge  has learned insulin pens    MEDICATIONS  (STANDING):  amLODIPine   Tablet 10 milliGRAM(s) Oral daily  apixaban 5 milliGRAM(s) Oral every 12 hours  aspirin enteric coated 81 milliGRAM(s) Oral daily  atorvastatin 80 milliGRAM(s) Oral at bedtime  budesonide 160 MICROgram(s)/formoterol 4.5 MICROgram(s) Inhaler 2 Puff(s) Inhalation two times a day  cloNIDine 0.1 milliGRAM(s) Oral two times a day  coronavirus bivalent (EUA) Booster Vaccine (PFIZER) 0.3 milliLiter(s) IntraMuscular once  dextrose 5%. 1000 milliLiter(s) (100 mL/Hr) IV Continuous <Continuous>  dextrose 5%. 1000 milliLiter(s) (50 mL/Hr) IV Continuous <Continuous>  dextrose 50% Injectable 25 Gram(s) IV Push once  dextrose 50% Injectable 12.5 Gram(s) IV Push once  dextrose 50% Injectable 25 Gram(s) IV Push once  furosemide    Tablet 40 milliGRAM(s) Oral daily  glucagon  Injectable 1 milliGRAM(s) IntraMuscular once  insulin glargine Injectable (LANTUS) 40 Unit(s) SubCutaneous at bedtime  insulin lispro (ADMELOG) corrective regimen sliding scale   SubCutaneous three times a day before meals  insulin lispro (ADMELOG) corrective regimen sliding scale   SubCutaneous at bedtime  insulin lispro Injectable (ADMELOG) 28 Unit(s) SubCutaneous three times a day before meals  levalbuterol Inhalation 0.63 milliGRAM(s) Inhalation every 6 hours  losartan 100 milliGRAM(s) Oral daily  metoclopramide 10 milliGRAM(s) Oral at bedtime  metoprolol tartrate 25 milliGRAM(s) Oral two times a day  montelukast 10 milliGRAM(s) Oral daily  potassium chloride    Tablet ER 40 milliEquivalent(s) Oral every 4 hours  predniSONE   Tablet 30 milliGRAM(s) Oral daily  tiotropium 2.5 MICROgram(s) Inhaler 2 Puff(s) Inhalation daily    MEDICATIONS  (PRN):  benzonatate 100 milliGRAM(s) Oral every 8 hours PRN Cough  dextrose Oral Gel 15 Gram(s) Oral once PRN Blood Glucose LESS THAN 70 milliGRAM(s)/deciliter  guaiFENesin Oral Liquid (Sugar-Free) 100 milliGRAM(s) Oral every 6 hours PRN Cough      Allergies    No Known Allergies    Intolerances        Review of Systems:  Constitutional: No fever, good appetite/po intake  Eyes: No blurry vision, diplopia  Neuro: No tremors  HEENT: No pain  Cardiovascular: No chest pain, palpitations  Respiratory: No SOB, no cough  GI: No nausea, vomiting,   : No dysuria, hematuria  Skin: no rash  Psych: no depression  Endocrine: no polyuria, polydipsia  Hem/lymph: no swelling  Osteoporosis: no fractures    ALL OTHER SYSTEMS REVIEWED AND NEGATIVE    PHYSICAL EXAM:  VITALS: T(C): 36.5 (01-13-23 @ 11:33)  T(F): 97.7 (01-13-23 @ 11:33), Max: 98.4 (01-12-23 @ 17:00)  HR: 62 (01-13-23 @ 11:33) (62 - 80)  BP: 113/53 (01-13-23 @ 11:33) (113/53 - 137/64)  RR:  (16 - 18)  SpO2:  (96% - 99%)  Wt(kg): --  GENERAL: NAD, well-groomed, well-developed  EYES: No proptosis, no lid lag, anicteric  HEENT:  Atraumatic, normocephalic, moist mucous membranes  RESPIRATORY: nonlabored respirations, no wheezing  PSYCH: Alert and oriented x 3, normal affect, normal mood    POCT Blood Glucose.: 227 mg/dL (01-13-23 @ 08:52)  POCT Blood Glucose.: 231 mg/dL (01-13-23 @ 08:28)  POCT Blood Glucose.: 170 mg/dL (01-12-23 @ 21:53)  POCT Blood Glucose.: 224 mg/dL (01-12-23 @ 18:33)  POCT Blood Glucose.: 211 mg/dL (01-12-23 @ 16:12)  POCT Blood Glucose.: 356 mg/dL (01-12-23 @ 12:00)  POCT Blood Glucose.: 227 mg/dL (01-12-23 @ 08:02)  POCT Blood Glucose.: 266 mg/dL (01-11-23 @ 21:19)  POCT Blood Glucose.: 416 mg/dL (01-11-23 @ 15:30)  POCT Blood Glucose.: 411 mg/dL (01-11-23 @ 15:28)  POCT Blood Glucose.: 399 mg/dL (01-11-23 @ 13:39)  POCT Blood Glucose.: 434 mg/dL (01-11-23 @ 13:34)  POCT Blood Glucose.: 473 mg/dL (01-11-23 @ 12:03)  POCT Blood Glucose.: 457 mg/dL (01-11-23 @ 12:01)  POCT Blood Glucose.: 196 mg/dL (01-11-23 @ 08:00)  POCT Blood Glucose.: 241 mg/dL (01-10-23 @ 21:45)  POCT Blood Glucose.: 391 mg/dL (01-10-23 @ 17:42)  POCT Blood Glucose.: 279 mg/dL (01-10-23 @ 12:35)                            11.1   20.13 )-----------( 421      ( 13 Jan 2023 04:59 )             33.0       01-13    139  |  103  |  27<H>  ----------------------------<  343<H>  3.2<L>   |  24  |  1.24    eGFR: 63    Ca    8.0<L>      01-13  Mg     2.10     01-13  Phos  3.9     01-13        Thyroid Function Tests:      01-08 Chol 170 Direct LDL -- LDL calculated 110<H> HDL 34<L> Trig 131    Radiology:

## 2023-01-13 NOTE — PROGRESS NOTE ADULT - PROBLEM SELECTOR PROBLEM 6
Uncontrolled type 2 diabetes mellitus with hyperglycemia
Hypertensive urgency
Uncontrolled type 2 diabetes mellitus with hyperglycemia
Hypertensive urgency
Uncontrolled type 2 diabetes mellitus with hyperglycemia

## 2023-01-13 NOTE — DISCHARGE NOTE NURSING/CASE MANAGEMENT/SOCIAL WORK - PATIENT PORTAL LINK FT
You can access the FollowMyHealth Patient Portal offered by [facility] by registering at the following website: [email or web address]. By joining [facility]’s FollowMyHealth portal, you will also be able to view your health information using other applications (apps) compatible with our system.

## 2023-01-13 NOTE — PROGRESS NOTE ADULT - PROBLEM SELECTOR PROBLEM 5
Hypertensive urgency
Acute asthma exacerbation
Acute asthma exacerbation

## 2023-01-13 NOTE — CHART NOTE - NSCHARTNOTEFT_GEN_A_CORE
This patient is pending discharge. No further inpatient pulmonary work up indicated.     This patient will need close follow up as an outpatient    This patient will need to follow up with the pulmonary team after discharge as a NEW PATIENT:    Please email: [email or web address] to setup an appointment a NEW PATIENT APPOINTMENT prior to discharge with any available pulmonologist. The appointment should be within 4-6 weeks of discharge from the hospital. Include the patient's name, , MRN and contact information in the email.      Pulmonary/Sleep Clinic  [address] [place] [address]  [phone]    Please discuss the appointment details with the patient and include the appointment details in the patients discharge summary.

## 2023-01-25 ENCOUNTER — APPOINTMENT (OUTPATIENT)
Dept: PULMONOLOGY | Facility: CLINIC | Age: 68
End: 2023-01-25
Payer: MEDICAID

## 2023-01-25 VITALS
RESPIRATION RATE: 15 BRPM | SYSTOLIC BLOOD PRESSURE: 132 MMHG | DIASTOLIC BLOOD PRESSURE: 67 MMHG | TEMPERATURE: 97 F | HEIGHT: 64 IN | OXYGEN SATURATION: 98 % | HEART RATE: 62 BPM | BODY MASS INDEX: 25.95 KG/M2 | WEIGHT: 152 LBS

## 2023-01-25 PROCEDURE — 99213 OFFICE O/P EST LOW 20 MIN: CPT | Mod: GC

## 2023-01-25 NOTE — HISTORY OF PRESENT ILLNESS
[Former] : former [>= 20 pack years] : >= 20 pack years [Never] : never [TextBox_4] : Previous pulmonologist Dr. Rojas ([facility]) Last seen >1 year ago. \par \par 65 M with long standing smoking history and Asthma (dx long time ago) and likely COPD (0.25ppd x >50 years, quit 1 month ago (cold turkey)), HTN, CAD (1 stent). Presented to [facility] with acute SOB found to be FLU A Positive and in asthma/copd exacerbation. \par \par Seen by house pulm, treated with steroids, duonebs. Discharged on symbicort + spiriva + Montelukast and prednisone taper (30x3,20x3,10x3). Course also complicated with AF w/ RVR, started on metoprolol and eliquis. He required 2L NC in the hospital but was weaned down to room air and discharged.\par \par Now with improvement in his breathing, almost back to baseline, no more shortness of breath. Still with some wheezing. Takes albuterol prn, does not have nebulizer.\par \par Now c/o constipation, stomach pain.\par \par Family requesting nebulizer, it was effective in the hospital. \par \par  [TextBox_11] : 0.25 [YearQuit] : 2023

## 2023-01-25 NOTE — ASSESSMENT
[FreeTextEntry1] : 67 year old with prior hx asthma and long term smoking history, with likely copd, presents after recent Asthma/COPD exacerbation 2/2 Flu A. \par \par # Asthma\par # COPD\par # Recent exacerbation 2/2 Influenza\par - Now back to baseline. No longer with o2 requirement and has finished prednisone taper. \par - Continue with Symbicort, spiriva, and montelukast\par - Nebulizer sent with levalbuterol (Hx AF w/ RVR)\par - Needs PFTs\par \par # Smoking prevention\par - Quit smoking since hospitalization\par - Quit cold turkey, not interested in patch/gum.\par \par # Prevention \par - S/p all vaccines including pneumoccocal, covid, flu at OSH PCP per son\par - Needs CT Chest for screening. \par \par RTC after CT Chest with plan for same day PFTs

## 2023-01-29 ENCOUNTER — TRANSCRIPTION ENCOUNTER (OUTPATIENT)
Age: 68
End: 2023-01-29

## 2023-02-15 ENCOUNTER — APPOINTMENT (OUTPATIENT)
Dept: GASTROENTEROLOGY | Facility: CLINIC | Age: 68
End: 2023-02-15
Payer: MEDICAID

## 2023-02-15 VITALS
DIASTOLIC BLOOD PRESSURE: 58 MMHG | OXYGEN SATURATION: 96 % | WEIGHT: 150 LBS | BODY MASS INDEX: 25.61 KG/M2 | SYSTOLIC BLOOD PRESSURE: 136 MMHG | HEART RATE: 58 BPM | HEIGHT: 64 IN

## 2023-02-15 DIAGNOSIS — K21.9 GASTRO-ESOPHAGEAL REFLUX DISEASE W/OUT ESOPHAGITIS: ICD-10-CM

## 2023-02-15 PROCEDURE — 99214 OFFICE O/P EST MOD 30 MIN: CPT

## 2023-02-15 NOTE — ASSESSMENT
[FreeTextEntry1] : 1. epigastric pain,off ppi and on asa and eliquis\par \par plan ppi daily\par  egd when clinically stable\par  f/u gi in 6 mnths\par \par 2. constipation\par \par plan miralax daily\par \par 3. early satiety\par \par plan mutliple small meals \par          reglan continue qhs\par \par 4. average risk for colon cancer, now on eliquis,defer colonoscopy at this time\par \par plan cologuard for screenng.

## 2023-02-15 NOTE — PHYSICAL EXAM
[Normal] : normal bowel sounds, non-tender, no masses, soft, no no hepato-splenomegaly [Bowel Sounds] : normal bowel sounds [de-identified] : epitastric pain

## 2023-02-15 NOTE — HISTORY OF PRESENT ILLNESS
[FreeTextEntry1] : 66 yo male with h/o DM, CAD, s/p stent in 2014 on aspirin,  and admitted last monthwith sepsis, respiratory distress and atrial fibrillation on eliquis.. Patient with c/o abdominal pain, stopped ppi . Patient reports poor appetitie, Patient reports 4 bms a week. small amount . no brbpr, no melena. no weieiht loss. recent weight loss.\par \par patient seen by me in 05/2022 cardiology clearance requested fro egd/colonoscopy pt did not have done\par no family h/o colon or gastric cancer.

## 2023-02-15 NOTE — REVIEW OF SYSTEMS
[As Noted in HPI] : as noted in HPI [Fever] : no fever [Chills] : no chills [Red Eyes] : eyes not red [Sore Throat] : no sore throat [Chest Pain] : no chest pain [Palpitations] : no palpitations [SOB on Exertion] : no shortness of breath during exertion [Testicular Pain] : no testicular pain [Skin Wound] : no skin wound [Dizziness] : no dizziness [Fainting] : no fainting [Muscle Weakness] : no muscle weakness [Easy Bleeding] : no tendency for easy bleeding

## 2023-02-22 ENCOUNTER — APPOINTMENT (OUTPATIENT)
Dept: ELECTROPHYSIOLOGY | Facility: CLINIC | Age: 68
End: 2023-02-22
Payer: MEDICAID

## 2023-02-22 ENCOUNTER — NON-APPOINTMENT (OUTPATIENT)
Age: 68
End: 2023-02-22

## 2023-02-22 VITALS
HEIGHT: 64 IN | HEART RATE: 54 BPM | WEIGHT: 315 LBS | DIASTOLIC BLOOD PRESSURE: 70 MMHG | BODY MASS INDEX: 53.78 KG/M2 | SYSTOLIC BLOOD PRESSURE: 158 MMHG | OXYGEN SATURATION: 97 %

## 2023-02-22 DIAGNOSIS — I25.10 ATHEROSCLEROTIC HEART DISEASE OF NATIVE CORONARY ARTERY W/OUT ANGINA PECTORIS: ICD-10-CM

## 2023-02-22 PROCEDURE — 93000 ELECTROCARDIOGRAM COMPLETE: CPT

## 2023-02-22 PROCEDURE — 99204 OFFICE O/P NEW MOD 45 MIN: CPT

## 2023-02-22 RX ORDER — ASPIRIN 325 MG/1
TABLET, FILM COATED ORAL
Refills: 0 | Status: DISCONTINUED | COMMUNITY
End: 2023-02-22

## 2023-02-22 NOTE — HISTORY OF PRESENT ILLNESS
[FreeTextEntry1] : Referring Physician: Marc Louis MD\par  \par Dear Dr. Louis:\par  \par Mr. Garza seen in the [facility] today. For our records, please allow me to summarize the history and my findings.\par  \par This pleasant 67 year old man has a cardiovascular history significant for HTN, CAD s/p x1 stent, DM2, and AF on Eliquis. He has PMHx of COPD. He was recently hospitalized with a COPD exacerbation in the setting of Flu A infection. He was noted to be in AF at the time. He was started on Eliquis and Metoprolol. He had some pauses that were asymptomatic. Since discharge, he has been doing well. He continues to be in AF. \par  \par Mr. Carreon denies any recent history of chest pain, shortness of breath, palpitations, dizziness, or syncope.\par

## 2023-02-22 NOTE — PHYSICAL EXAM

## 2023-02-22 NOTE — DISCUSSION/SUMMARY
[FreeTextEntry1] : In summary, this is a 67 year old man with HTN, CAD s/p x1 stent, DM2, and AF on Eliquis. He has PMHx of COPD. He was recently hospitalized with a COPD exacerbation in the setting of Flu A. \par \par #Persistent AF - He continues to be in atrial fibrillation. His rates are acceptable today. Given his age and COPD, he would benefit from rhythm control long term. We had a long discussion today with regards to AF ablation vs DCCV. He would like to proceed with DCCV first and see how he feels. He has been on Eliquis, and reports no bleeding at this time. We discussed the high risk nature of being on a blood thinner. He can stop ASA at this time. Patient is scheduled for DCCV.\par \par #CAD - s/p PCI in 2014. Stable. No CP at this time. Can stop ASA and remain on Eliquis to decrease long term risk of bleeding.\par  \par Mr. Carreon appeared to understand the whole discussion and verbalized that all of his questions were answered to his satisfaction.\par  \par Thank you for allowing me to be involved in the care of this pleasant man. Please feel free to contact me with any questions. [EKG obtained to assist in diagnosis and management of assessed problem(s)] : EKG obtained to assist in diagnosis and management of assessed problem(s)

## 2023-02-22 NOTE — CARDIOLOGY SUMMARY
[de-identified] : 2/22/23: Atrial fibrillation at 100 bpm [de-identified] : 1/10/23: \par Mitral Valve: Mitral annular calcification, otherwise \par normal mitral valve. Mild mitral regurgitation.\par Aortic Root: Normal aortic root.\par Aortic Valve: Calcified trileaflet aortic valve with normal\par opening. Mild-moderate aortic regurgitation.\par Left Atrium: Normal left atrium.  LA volume index = 28\par cc/m2.\par Left Ventricle: Normal left ventricular systolic function.\par No segmental wall motion abnormalities. Eccentric left\par ventricular hypertrophy (dilated left ventricle with normal\par relative wall thickness). EF 55%\par Right Heart: Normal right atrium. Normal right ventricular\par size and function. Normal tricuspid valve.   Minimal\par tricuspid regurgitation. Normal pulmonic valve. Minimal\par pulmonic regurgitation.\par Pericardium/PleuraNormal pericardium with no pericardial\par effusion.\par Hemodynamic: Estimated right ventricular systolic pressure\par equals 30 mm Hg, assuming right atrial pressure equals 10\par mm Hg, consistent with normal pulmonary pressures.

## 2023-03-08 ENCOUNTER — APPOINTMENT (OUTPATIENT)
Dept: ELECTROPHYSIOLOGY | Facility: CLINIC | Age: 68
End: 2023-03-08

## 2023-03-16 ENCOUNTER — APPOINTMENT (OUTPATIENT)
Dept: ELECTROPHYSIOLOGY | Facility: CLINIC | Age: 68
End: 2023-03-16
Payer: MEDICAID

## 2023-03-16 ENCOUNTER — NON-APPOINTMENT (OUTPATIENT)
Age: 68
End: 2023-03-16

## 2023-03-16 VITALS
TEMPERATURE: 97.5 F | SYSTOLIC BLOOD PRESSURE: 153 MMHG | BODY MASS INDEX: 26.12 KG/M2 | HEART RATE: 94 BPM | DIASTOLIC BLOOD PRESSURE: 71 MMHG | HEIGHT: 64 IN | WEIGHT: 153 LBS | OXYGEN SATURATION: 97 %

## 2023-03-16 DIAGNOSIS — J41.8 MIXED SIMPLE AND MUCOPURULENT CHRONIC BRONCHITIS: ICD-10-CM

## 2023-03-16 PROCEDURE — 93000 ELECTROCARDIOGRAM COMPLETE: CPT

## 2023-03-16 PROCEDURE — 99215 OFFICE O/P EST HI 40 MIN: CPT

## 2023-03-17 PROBLEM — J41.8 MIXED SIMPLE AND MUCOPURULENT CHRONIC BRONCHITIS: Status: RESOLVED | Noted: 2023-01-25 | Resolved: 2023-03-17

## 2023-03-17 NOTE — HISTORY OF PRESENT ILLNESS
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 Was hospitalized back in January 2023 for SOB secondary to COPD exacerbation/+ Influenza A. His course was complicated by new onset afib. Has been on Eliquis since that time. Accompanied by family member. States he is doing well with no issues or complaints. Denies chest pain, palpitations, SOB, syncope or near syncope. Remains on Eliquis without s/s of bleeding. Saw alternate EP MD who suggested considering DCCV vs ablation given persistent afib, refused at that time.

## 2023-03-17 NOTE — DISCUSSION/SUMMARY
[EKG obtained to assist in diagnosis and management of assessed problem(s)] : EKG obtained to assist in diagnosis and management of assessed problem(s) [FreeTextEntry1] : Impression:\par \par 1. Persistent afib: EKG performed today to assess for presence of conduction disease and reveals afib, well rate controlled. ECHO with normal LVEF. On Eliquis without s/s of bleeding. Discussed treatment options for afib including rate control vs antiarrhythmics vs DCCV vs possible ablation. Prefers to avoid any procedures or changes at this time. Risks, benefits, and alternatives discussed at length. Will resume Eliqius as prescribed for thromboembolic prophylaxis.\par \par 2. HTN: Encouraged heart healthy diet, sodium restriction, and weight loss. Continue regular f/u with Cardiologist for further HTN management.\par \par Will continue f/u with Cardiologist and may RTO as needed or if any new or worsening symptoms or findings occur.

## 2023-03-17 NOTE — CARDIOLOGY SUMMARY
[de-identified] :  1/10/23: \par Mitral Valve: Mitral annular calcification, otherwise \par normal mitral valve. Mild mitral regurgitation.\par Aortic Root: Normal aortic root.\par Aortic Valve: Calcified trileaflet aortic valve with normal\par opening. Mild-moderate aortic regurgitation.\par Left Atrium: Normal left atrium. LA volume index = 28\par cc/m2.\par Left Ventricle: Normal left ventricular systolic function.\par No segmental wall motion abnormalities. Eccentric left\par ventricular hypertrophy (dilated left ventricle with normal\par relative wall thickness). EF 55%\par Right Heart: Normal right atrium. Normal right ventricular\par size and function. Normal tricuspid valve. Minimal\par tricuspid regurgitation. Normal pulmonic valve. Minimal\par pulmonic regurgitation.\par Pericardium/PleuraNormal pericardium with no pericardial\par effusion.\par Hemodynamic: Estimated right ventricular systolic pressure\par equals 30 mm Hg, assuming right atrial pressure equals 10\par mm Hg, consistent with normal pulmonary pressures. \par

## 2023-03-29 ENCOUNTER — APPOINTMENT (OUTPATIENT)
Dept: PULMONOLOGY | Facility: CLINIC | Age: 68
End: 2023-03-29

## 2023-05-09 ENCOUNTER — APPOINTMENT (OUTPATIENT)
Dept: ENDOCRINOLOGY | Facility: CLINIC | Age: 68
End: 2023-05-09

## 2023-05-31 ENCOUNTER — APPOINTMENT (OUTPATIENT)
Dept: PULMONOLOGY | Facility: CLINIC | Age: 68
End: 2023-05-31
Payer: MEDICAID

## 2023-05-31 ENCOUNTER — LABORATORY RESULT (OUTPATIENT)
Age: 68
End: 2023-05-31

## 2023-05-31 VITALS
HEART RATE: 83 BPM | HEIGHT: 63 IN | DIASTOLIC BLOOD PRESSURE: 73 MMHG | BODY MASS INDEX: 27.29 KG/M2 | SYSTOLIC BLOOD PRESSURE: 137 MMHG | WEIGHT: 154 LBS

## 2023-05-31 PROCEDURE — 94060 EVALUATION OF WHEEZING: CPT | Mod: GC

## 2023-05-31 PROCEDURE — 94729 DIFFUSING CAPACITY: CPT | Mod: GC

## 2023-05-31 PROCEDURE — ZZZZZ: CPT

## 2023-05-31 PROCEDURE — 94726 PLETHYSMOGRAPHY LUNG VOLUMES: CPT | Mod: GC

## 2023-05-31 PROCEDURE — 99213 OFFICE O/P EST LOW 20 MIN: CPT | Mod: GC,25

## 2023-05-31 RX ORDER — BUDESONIDE AND FORMOTEROL FUMARATE DIHYDRATE 160; 4.5 UG/1; UG/1
160-4.5 AEROSOL RESPIRATORY (INHALATION) TWICE DAILY
Qty: 3 | Refills: 1 | Status: ACTIVE | COMMUNITY
Start: 2023-05-31 | End: 1900-01-01

## 2023-05-31 RX ORDER — TIOTROPIUM BROMIDE INHALATION SPRAY 3.12 UG/1
2.5 SPRAY, METERED RESPIRATORY (INHALATION) DAILY
Qty: 1 | Refills: 3 | Status: ACTIVE | COMMUNITY
Start: 2023-05-31 | End: 1900-01-01

## 2023-05-31 RX ORDER — MONTELUKAST 10 MG/1
10 TABLET, FILM COATED ORAL
Qty: 30 | Refills: 3 | Status: ACTIVE | COMMUNITY
Start: 2023-05-31 | End: 1900-01-01

## 2023-05-31 RX ORDER — LEVALBUTEROL HYDROCHLORIDE 1.25 MG/3ML
1.25 SOLUTION RESPIRATORY (INHALATION)
Qty: 1 | Refills: 3 | Status: ACTIVE | COMMUNITY
Start: 2023-01-25 | End: 1900-01-01

## 2023-05-31 NOTE — ASSESSMENT
[FreeTextEntry1] : 66 yo M with PMH asthma and COPD, current smoker, HTN, and CAD who presents for follow up.\par \par PFTs show obstructive disease with bronchodilator response, reduced diffusing capacity.\par \par # asthma and COPD overlap\par - PFTs 5/2023: obstructive disease with bronchodilator response, reduced diffusing capacity\par - ambulatory O2 sat > 95% on room air \par - continue symbicort 160/4.5\par - contiinue spiriva\par - continue singulair\par - continue levalbuterol nebs prn\par - nebs and inhalers refilled today\par - CBC with diff today to rule out eosinophilia (prior CBC in January was soon after prolonged steroid course)\par \par # current smoker\par - smoking cessation discussed and encouraged\par - pending CT chest for lung ca screening (reordered today)

## 2023-05-31 NOTE — REVIEW OF SYSTEMS
[Cough] : cough [SOB on Exertion] : sob on exertion [Fever] : no fever [Chills] : no chills [Eye Irritation] : no eye irritation [Nasal Congestion] : no nasal congestion [Hemoptysis] : no hemoptysis [Chest Tightness] : no chest tightness [Sputum] : no sputum [Dyspnea] : no dyspnea [Pleuritic Pain] : no pleuritic pain [Chest Discomfort] : no chest discomfort [Watery Eyes] : no watery eyes [GERD] : no gerd [Rash] : no rash

## 2023-05-31 NOTE — HISTORY OF PRESENT ILLNESS
[Current] : current [TextBox_4] : 66 yo M with PMH asthma/COPD, current smoker (>10pack year history, 3-4 cigarettes daily), HTN, and CAD who presents for follow up.\par He was last seen in pulmonary clinic in 1/2023 after he was hospitalized for copd exacerbation in setting of influenza infection. He was treated with steroids, duonebs, and a prolonged steroid taper. \par \par He is currently using levalbuterol prn (1-2 times per month). He reports dry cough that is occasional. Exercise tolerance is 2-3 blocks (stable). No fever, chills. No recent travel. No sick contacts. [TextBox_11] : 0.25 [TextBox_13] : 50

## 2023-06-05 ENCOUNTER — NON-APPOINTMENT (OUTPATIENT)
Age: 68
End: 2023-06-05

## 2023-06-05 VITALS — HEIGHT: 63 IN | WEIGHT: 154 LBS | BODY MASS INDEX: 27.29 KG/M2

## 2023-06-05 DIAGNOSIS — J45.909 UNSPECIFIED ASTHMA, UNCOMPLICATED: ICD-10-CM

## 2023-06-05 DIAGNOSIS — J44.9 CHRONIC OBSTRUCTIVE PULMONARY DISEASE, UNSPECIFIED: ICD-10-CM

## 2023-06-05 DIAGNOSIS — F17.200 NICOTINE DEPENDENCE, UNSPECIFIED, UNCOMPLICATED: ICD-10-CM

## 2023-06-05 NOTE — HISTORY OF PRESENT ILLNESS
[Current] : Current [TextBox_13] : Referred by Dr. Alcazar, shared decision making managed by Dr. 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 TOBIAS denies any personal history of lung cancer. Denies any s/s of lung cancer. No lung cancer in a 1st degree relative. Denies any history of occupational exposures\par  [PacksperYear] : 20

## 2023-06-05 NOTE — REASON FOR VISIT
[Home] : at home, [unfilled] , at the time of the visit. [Medical Office: ([facility])___] : at the medical office located in  [Verbal consent obtained from patient] : the patient, [unfilled] [Initial Evaluation] : an initial evaluation visit [Review of Eligibility] : review of eligibility [Low-Dose CT Screening Discussion] : low-dose CT lung cancer screening discussion [Virtual Visit] : virtual visit

## 2023-06-06 ENCOUNTER — OUTPATIENT (OUTPATIENT)
Dept: OUTPATIENT SERVICES | Facility: HOSPITAL | Age: 68
LOS: 1 days | End: 2023-06-06
Payer: MEDICAID

## 2023-06-06 ENCOUNTER — APPOINTMENT (OUTPATIENT)
Dept: CT IMAGING | Facility: IMAGING CENTER | Age: 68
End: 2023-06-06
Payer: MEDICAID

## 2023-06-06 DIAGNOSIS — F17.200 NICOTINE DEPENDENCE, UNSPECIFIED, UNCOMPLICATED: ICD-10-CM

## 2023-06-06 PROCEDURE — 71271 CT THORAX LUNG CANCER SCR C-: CPT | Mod: 26

## 2023-06-06 PROCEDURE — 71271 CT THORAX LUNG CANCER SCR C-: CPT

## 2023-06-06 RX ORDER — GLYBURIDE 5 MG
1 TABLET ORAL
Qty: 0 | Refills: 0 | DISCHARGE

## 2023-07-17 NOTE — PROGRESS NOTE ADULT - PROBLEM SELECTOR PLAN 2
2/2 sepsis and hypoxia, reverted back into afib briefly overnight  - Cont Heparin drip, can transition to DOAC pending insurance coverage and TTE   - Cardiology following  [ ] TTE   [ ] EP c/s Monday for 3s pause seen on tele
From: Monica Cooley  To: Patricia Hutchinson  Sent: 7/16/2023 7:57 PM CDT  Subject: Testing    Hello,    I look forward to our appointment on Wednesday. We have not received the electronic SRS form from Patricia's teacher. It would be helpful to have that completed prior to Wednesday morning.     See you soon,  Monica Cooley, NP  
2/2 sepsis and hypoxia, Now resolved, currently in sinus on telemetry  - discontinued heparin gtt   - seen by cardiology, can hold off on AC unless there is long term recurrence
Per chart, pt hypoxic to 80s with EMS, was on NRB. Was also on HFNC earlier? but now 98% on room air, but still tachypneic w/diffuse wheezing. 2/2 Asthma exacerbation 2/2 Flu   -CXR reviewed   -C/w Singulair, Symbicort and neb.   -Decrease IV Solumedrol 40mg BID to qd per pulm  -Will need ambulatory sat  -Pulm following
Per chart, pt hypoxic to 80s with EMS, was on NRB. Was also on HFNC earlier? but now 98% on room air, but still tachypneic w/diffuse wheezing. 2/2 Asthma exacerbation 2/2 Flu   -CXR reviewed   -C/w Singulair, Symbicort and neb.   -Start prednisone taper  -c/w po 40mg daily per card  -check ambulatory sat   -Pulm following
Per chart, pt hypoxic to 80s with EMS, was on NRB. Was also on HFNC earlier? but now 98% on room air, but still tachypneic w/diffuse wheezing. 2/2 Asthma exacerbation 2/2 Flu   -CXR reviewed   -C/w Singulair, Symbicort and neb.   -Prednisone taper regimen per pulm  -Pulm following
Per chart, pt hypoxic to 80s with EMS, was on NRB. Was also on HFNC earlier? but now 98% on room air, but still tachypneic w/diffuse wheezing. 2/2 Asthma exacerbation 2/2 Flu   -CXR reviewed   -C/w Singulair, Symbicort and neb.   -Decrease IV Solumedrol to prednisone 40mg daily per pulm  -transition from lasix 20mg IV BID to po 40mg daily per card  -Will need ambulatory sat  -Pulm following
Per chart, pt hypoxic to 80s with EMS, was on NRB. Was also on HFNC earlier? but now 98% on room air, but still tachypneic w/diffuse wheezing. 2/2 Asthma exacerbation 2/2 Flu   -CXR reviewed   -C/w Singulair, Symbicort and neb.   -Prednisone taper regimen per pulm  -c/w po 40mg daily per card   -Pulm following

## 2023-08-09 ENCOUNTER — APPOINTMENT (OUTPATIENT)
Dept: GASTROENTEROLOGY | Facility: CLINIC | Age: 68
End: 2023-08-09
Payer: MEDICAID

## 2023-08-09 VITALS
WEIGHT: 148 LBS | SYSTOLIC BLOOD PRESSURE: 110 MMHG | HEIGHT: 63 IN | OXYGEN SATURATION: 94 % | BODY MASS INDEX: 26.22 KG/M2 | HEART RATE: 95 BPM | DIASTOLIC BLOOD PRESSURE: 60 MMHG

## 2023-08-09 DIAGNOSIS — R10.9 UNSPECIFIED ABDOMINAL PAIN: ICD-10-CM

## 2023-08-09 DIAGNOSIS — R10.30 LOWER ABDOMINAL PAIN, UNSPECIFIED: ICD-10-CM

## 2023-08-09 DIAGNOSIS — K59.00 CONSTIPATION, UNSPECIFIED: ICD-10-CM

## 2023-08-09 PROCEDURE — 99214 OFFICE O/P EST MOD 30 MIN: CPT

## 2023-08-09 RX ORDER — PANTOPRAZOLE 40 MG/1
40 TABLET, DELAYED RELEASE ORAL
Qty: 30 | Refills: 5 | Status: ACTIVE | COMMUNITY
Start: 2022-05-24 | End: 1900-01-01

## 2023-08-09 RX ORDER — SENNOSIDES 8.6 MG TABLETS 8.6 MG/1
8.6 TABLET ORAL
Qty: 60 | Refills: 5 | Status: ACTIVE | COMMUNITY
Start: 2023-08-09 | End: 1900-01-01

## 2023-08-09 NOTE — ASSESSMENT
[FreeTextEntry1] : 1. average risk for colon cancer,  on eliquis for afib. echo with normal EF. patient with cough and congestion, increase risk for  anesthesia  plan cologuard ordered again after demonstration of how to do cologuard with sample kit, if unsuccesful recommend ct colongraphy for evaluation  2. chronic constipation, miralas did not work in the past  plan : senna 2 tabs qhs  3. abdominal pain, h/o improved on ppi, pt on asa treatment  plan ppi daily indefinitiely gi prophylaxsis

## 2023-08-09 NOTE — HISTORY OF PRESENT ILLNESS
[FreeTextEntry1] : 69 yo male with h/o DM,CAD , s/p stent in 2014 on asa. patient here to follow up . cologuard ordered for colon cancer screening, done 2 times but specimen was not processed  due to collection issue.  per son patient c/o lower quadrant pain with consitpation, takes "pills" prn constipation.After bm , abdominal pain and appetitie improved. Reports decrease  abdominal pain with ppi. no brbpr, no melena. ? weight loss.  no cardiology clearance obtainted for egd/colonoscopy in 05/2022,  no family h/o gastric or colon cancer

## 2023-08-09 NOTE — PHYSICAL EXAM
[Normal] : alert, normal voice/communication, healthy appearing, no acute distress [Alert] : alert [Sclera] : the sclera and conjunctiva were normal [Hearing Threshold Finger Rub Not [place]] : hearing was normal [Normal Appearance] : the appearance of the neck was normal [No Respiratory Distress] : no respiratory distress [Auscultation Breath Sounds / Voice Sounds] : lungs were clear to auscultation bilaterally [Heart Rate And Rhythm] : heart rate was normal and rhythm regular [Normal S1, S2] : normal S1 and S2 [None] : no edema [Bowel Sounds] : normal bowel sounds [Abdomen Tenderness] : non-tender [No Masses] : no abdominal mass palpated [Abdomen Soft] : soft [No CVA Tenderness] : no CVA  tenderness [Abnormal Walk] : normal gait [] : no rash [No Focal Deficits] : no focal deficits [Oriented To Time, Place, And Person] : oriented to person, place, and time

## 2023-08-09 NOTE — REVIEW OF SYSTEMS
[Fever] : no fever [Chills] : no chills [Red Eyes] : eyes not red [Sore Throat] : no sore throat [Chest Pain] : no chest pain [SOB on Exertion] : no shortness of breath during exertion [As Noted in HPI] : as noted in HPI [Rash] : no rash [Joint Stiffness] : no joint stiffness [Skin Wound] : no skin wound [Dizziness] : no dizziness [Anxiety] : no anxiety [Muscle Weakness] : no muscle weakness [Easy Bruising] : no tendency for easy bruising

## 2023-08-09 NOTE — REASON FOR VISIT
[Follow-up] : a follow-up of an existing diagnosis [Family Member] : family member [FreeTextEntry1] : constipation, lower quadrant abdominal pain

## 2023-09-13 ENCOUNTER — LABORATORY RESULT (OUTPATIENT)
Age: 68
End: 2023-09-13

## 2023-09-13 ENCOUNTER — APPOINTMENT (OUTPATIENT)
Dept: PULMONOLOGY | Facility: CLINIC | Age: 68
End: 2023-09-13
Payer: MEDICAID

## 2023-09-13 VITALS
BODY MASS INDEX: 26.58 KG/M2 | HEIGHT: 63 IN | TEMPERATURE: 97 F | HEART RATE: 120 BPM | RESPIRATION RATE: 15 BRPM | DIASTOLIC BLOOD PRESSURE: 72 MMHG | OXYGEN SATURATION: 90 % | WEIGHT: 150 LBS | SYSTOLIC BLOOD PRESSURE: 145 MMHG

## 2023-09-13 DIAGNOSIS — J44.9 CHRONIC OBSTRUCTIVE PULMONARY DISEASE, UNSPECIFIED: ICD-10-CM

## 2023-09-13 PROBLEM — E11.9 TYPE 2 DIABETES MELLITUS WITHOUT COMPLICATIONS: Chronic | Status: ACTIVE | Noted: 2023-01-06

## 2023-09-13 PROBLEM — I10 ESSENTIAL (PRIMARY) HYPERTENSION: Chronic | Status: ACTIVE | Noted: 2023-01-06

## 2023-09-13 PROBLEM — J45.909 UNSPECIFIED ASTHMA, UNCOMPLICATED: Chronic | Status: ACTIVE | Noted: 2023-01-06

## 2023-09-13 PROBLEM — I25.10 ATHEROSCLEROTIC HEART DISEASE OF NATIVE CORONARY ARTERY WITHOUT ANGINA PECTORIS: Chronic | Status: ACTIVE | Noted: 2023-01-06

## 2023-09-13 PROCEDURE — 99213 OFFICE O/P EST LOW 20 MIN: CPT

## 2023-09-15 LAB
A ALTERNATA IGE QN: <0.1 KUA/L
A FUMIGATUS IGE QN: <0.1 KUA/L
C ALBICANS IGE QN: <0.1 KUA/L
C HERBARUM IGE QN: <0.1 KUA/L
CAT DANDER IGE QN: <0.1 KUA/L
COMMON RAGWEED IGE QN: <0.1 KUA/L
D FARINAE IGE QN: <0.1 KUA/L
D PTERONYSS IGE QN: <0.1 KUA/L
DEPRECATED A ALTERNATA IGE RAST QL: 0
DEPRECATED A FUMIGATUS IGE RAST QL: 0
DEPRECATED C ALBICANS IGE RAST QL: 0
DEPRECATED C HERBARUM IGE RAST QL: 0
DEPRECATED CAT DANDER IGE RAST QL: 0
DEPRECATED COMMON RAGWEED IGE RAST QL: 0
DEPRECATED D FARINAE IGE RAST QL: 0
DEPRECATED D PTERONYSS IGE RAST QL: 0
DEPRECATED DOG DANDER IGE RAST QL: 0
DEPRECATED M RACEMOSUS IGE RAST QL: 0
DEPRECATED ROACH IGE RAST QL: 0
DEPRECATED TIMOTHY IGE RAST QL: 0
DEPRECATED WHITE OAK IGE RAST QL: 0
DOG DANDER IGE QN: <0.1 KUA/L
M RACEMOSUS IGE QN: <0.1 KUA/L
ROACH IGE QN: <0.1 KUA/L
TIMOTHY IGE QN: <0.1 KUA/L
TOTAL IGE SMQN RAST: 111 KU/L
WHITE OAK IGE QN: <0.1 KUA/L

## 2023-10-31 ENCOUNTER — OUTPATIENT (OUTPATIENT)
Dept: OUTPATIENT SERVICES | Facility: HOSPITAL | Age: 68
LOS: 1 days | End: 2023-10-31

## 2023-10-31 ENCOUNTER — APPOINTMENT (OUTPATIENT)
Dept: RADIOLOGY | Facility: HOSPITAL | Age: 68
End: 2023-10-31
Payer: MEDICAID

## 2023-10-31 ENCOUNTER — RESULT REVIEW (OUTPATIENT)
Age: 68
End: 2023-10-31

## 2023-10-31 DIAGNOSIS — R10.30 LOWER ABDOMINAL PAIN, UNSPECIFIED: ICD-10-CM

## 2023-10-31 DIAGNOSIS — R10.9 UNSPECIFIED ABDOMINAL PAIN: ICD-10-CM

## 2023-10-31 PROCEDURE — 74240 X-RAY XM UPR GI TRC 1CNTRST: CPT | Mod: 26

## 2023-11-12 ENCOUNTER — INPATIENT (INPATIENT)
Facility: HOSPITAL | Age: 68
LOS: 3 days | Discharge: HOME | End: 2023-11-16
Attending: HOSPITALIST | Admitting: HOSPITALIST
Payer: MEDICAID

## 2023-11-12 VITALS — OXYGEN SATURATION: 100 % | HEART RATE: 89 BPM

## 2023-11-12 DIAGNOSIS — J45.901 UNSPECIFIED ASTHMA WITH (ACUTE) EXACERBATION: ICD-10-CM

## 2023-11-12 LAB
A1C WITH ESTIMATED AVERAGE GLUCOSE RESULT: 8.7 % — HIGH (ref 4–5.6)
A1C WITH ESTIMATED AVERAGE GLUCOSE RESULT: 8.7 % — HIGH (ref 4–5.6)
ALBUMIN SERPL ELPH-MCNC: 3.9 G/DL — SIGNIFICANT CHANGE UP (ref 3.3–5)
ALBUMIN SERPL ELPH-MCNC: 3.9 G/DL — SIGNIFICANT CHANGE UP (ref 3.3–5)
ALP SERPL-CCNC: 125 U/L — HIGH (ref 40–120)
ALP SERPL-CCNC: 125 U/L — HIGH (ref 40–120)
ALT FLD-CCNC: 12 U/L — SIGNIFICANT CHANGE UP (ref 4–41)
ALT FLD-CCNC: 12 U/L — SIGNIFICANT CHANGE UP (ref 4–41)
ANION GAP SERPL CALC-SCNC: 12 MMOL/L — SIGNIFICANT CHANGE UP (ref 7–14)
ANION GAP SERPL CALC-SCNC: 12 MMOL/L — SIGNIFICANT CHANGE UP (ref 7–14)
APPEARANCE UR: CLEAR — SIGNIFICANT CHANGE UP
APPEARANCE UR: CLEAR — SIGNIFICANT CHANGE UP
AST SERPL-CCNC: 12 U/L — SIGNIFICANT CHANGE UP (ref 4–40)
AST SERPL-CCNC: 12 U/L — SIGNIFICANT CHANGE UP (ref 4–40)
B PERT DNA SPEC QL NAA+PROBE: SIGNIFICANT CHANGE UP
B PERT DNA SPEC QL NAA+PROBE: SIGNIFICANT CHANGE UP
B PERT+PARAPERT DNA PNL SPEC NAA+PROBE: SIGNIFICANT CHANGE UP
B PERT+PARAPERT DNA PNL SPEC NAA+PROBE: SIGNIFICANT CHANGE UP
BACTERIA # UR AUTO: NEGATIVE /HPF — SIGNIFICANT CHANGE UP
BACTERIA # UR AUTO: NEGATIVE /HPF — SIGNIFICANT CHANGE UP
BASE EXCESS BLDV CALC-SCNC: -0.2 MMOL/L — SIGNIFICANT CHANGE UP (ref -2–3)
BASE EXCESS BLDV CALC-SCNC: -0.2 MMOL/L — SIGNIFICANT CHANGE UP (ref -2–3)
BASE EXCESS BLDV CALC-SCNC: -0.4 MMOL/L — SIGNIFICANT CHANGE UP (ref -2–3)
BASE EXCESS BLDV CALC-SCNC: -0.4 MMOL/L — SIGNIFICANT CHANGE UP (ref -2–3)
BASE EXCESS BLDV CALC-SCNC: -1.5 MMOL/L — SIGNIFICANT CHANGE UP (ref -2–3)
BASE EXCESS BLDV CALC-SCNC: -1.5 MMOL/L — SIGNIFICANT CHANGE UP (ref -2–3)
BASOPHILS # BLD AUTO: 0.09 K/UL — SIGNIFICANT CHANGE UP (ref 0–0.2)
BASOPHILS # BLD AUTO: 0.09 K/UL — SIGNIFICANT CHANGE UP (ref 0–0.2)
BASOPHILS NFR BLD AUTO: 0.7 % — SIGNIFICANT CHANGE UP (ref 0–2)
BASOPHILS NFR BLD AUTO: 0.7 % — SIGNIFICANT CHANGE UP (ref 0–2)
BILIRUB SERPL-MCNC: 0.5 MG/DL — SIGNIFICANT CHANGE UP (ref 0.2–1.2)
BILIRUB SERPL-MCNC: 0.5 MG/DL — SIGNIFICANT CHANGE UP (ref 0.2–1.2)
BILIRUB UR-MCNC: NEGATIVE — SIGNIFICANT CHANGE UP
BILIRUB UR-MCNC: NEGATIVE — SIGNIFICANT CHANGE UP
BLOOD GAS VENOUS COMPREHENSIVE RESULT: SIGNIFICANT CHANGE UP
BORDETELLA PARAPERTUSSIS (RAPRVP): SIGNIFICANT CHANGE UP
BORDETELLA PARAPERTUSSIS (RAPRVP): SIGNIFICANT CHANGE UP
BUN SERPL-MCNC: 16 MG/DL — SIGNIFICANT CHANGE UP (ref 7–23)
BUN SERPL-MCNC: 16 MG/DL — SIGNIFICANT CHANGE UP (ref 7–23)
C PNEUM DNA SPEC QL NAA+PROBE: SIGNIFICANT CHANGE UP
C PNEUM DNA SPEC QL NAA+PROBE: SIGNIFICANT CHANGE UP
CA-I SERPL-SCNC: 1.14 MMOL/L — LOW (ref 1.15–1.33)
CA-I SERPL-SCNC: 1.14 MMOL/L — LOW (ref 1.15–1.33)
CALCIUM SERPL-MCNC: 9.2 MG/DL — SIGNIFICANT CHANGE UP (ref 8.4–10.5)
CALCIUM SERPL-MCNC: 9.2 MG/DL — SIGNIFICANT CHANGE UP (ref 8.4–10.5)
CAST: 2 /LPF — SIGNIFICANT CHANGE UP (ref 0–4)
CAST: 2 /LPF — SIGNIFICANT CHANGE UP (ref 0–4)
CHLORIDE BLDV-SCNC: 100 MMOL/L — SIGNIFICANT CHANGE UP (ref 96–108)
CHLORIDE BLDV-SCNC: 99 MMOL/L — SIGNIFICANT CHANGE UP (ref 96–108)
CHLORIDE BLDV-SCNC: 99 MMOL/L — SIGNIFICANT CHANGE UP (ref 96–108)
CHLORIDE SERPL-SCNC: 100 MMOL/L — SIGNIFICANT CHANGE UP (ref 98–107)
CHLORIDE SERPL-SCNC: 100 MMOL/L — SIGNIFICANT CHANGE UP (ref 98–107)
CK MB BLD-MCNC: 6 % — HIGH (ref 0–2.5)
CK MB BLD-MCNC: 6 % — HIGH (ref 0–2.5)
CK MB CFR SERPL CALC: 3.5 NG/ML — SIGNIFICANT CHANGE UP
CK MB CFR SERPL CALC: 3.5 NG/ML — SIGNIFICANT CHANGE UP
CK SERPL-CCNC: 58 U/L — SIGNIFICANT CHANGE UP (ref 30–200)
CK SERPL-CCNC: 58 U/L — SIGNIFICANT CHANGE UP (ref 30–200)
CO2 BLDV-SCNC: 29.1 MMOL/L — HIGH (ref 22–26)
CO2 BLDV-SCNC: 29.1 MMOL/L — HIGH (ref 22–26)
CO2 BLDV-SCNC: 29.7 MMOL/L — HIGH (ref 22–26)
CO2 BLDV-SCNC: 29.7 MMOL/L — HIGH (ref 22–26)
CO2 BLDV-SCNC: 29.9 MMOL/L — HIGH (ref 22–26)
CO2 BLDV-SCNC: 29.9 MMOL/L — HIGH (ref 22–26)
CO2 SERPL-SCNC: 25 MMOL/L — SIGNIFICANT CHANGE UP (ref 22–31)
CO2 SERPL-SCNC: 25 MMOL/L — SIGNIFICANT CHANGE UP (ref 22–31)
COLOR SPEC: YELLOW — SIGNIFICANT CHANGE UP
COLOR SPEC: YELLOW — SIGNIFICANT CHANGE UP
CREAT SERPL-MCNC: 1.24 MG/DL — SIGNIFICANT CHANGE UP (ref 0.5–1.3)
CREAT SERPL-MCNC: 1.24 MG/DL — SIGNIFICANT CHANGE UP (ref 0.5–1.3)
DIFF PNL FLD: ABNORMAL
DIFF PNL FLD: ABNORMAL
EGFR: 63 ML/MIN/1.73M2 — SIGNIFICANT CHANGE UP
EGFR: 63 ML/MIN/1.73M2 — SIGNIFICANT CHANGE UP
EOSINOPHIL # BLD AUTO: 0.56 K/UL — HIGH (ref 0–0.5)
EOSINOPHIL # BLD AUTO: 0.56 K/UL — HIGH (ref 0–0.5)
EOSINOPHIL NFR BLD AUTO: 4.2 % — SIGNIFICANT CHANGE UP (ref 0–6)
EOSINOPHIL NFR BLD AUTO: 4.2 % — SIGNIFICANT CHANGE UP (ref 0–6)
ESTIMATED AVERAGE GLUCOSE: 203 — SIGNIFICANT CHANGE UP
ESTIMATED AVERAGE GLUCOSE: 203 — SIGNIFICANT CHANGE UP
FLUAV SUBTYP SPEC NAA+PROBE: SIGNIFICANT CHANGE UP
FLUAV SUBTYP SPEC NAA+PROBE: SIGNIFICANT CHANGE UP
FLUBV RNA SPEC QL NAA+PROBE: SIGNIFICANT CHANGE UP
FLUBV RNA SPEC QL NAA+PROBE: SIGNIFICANT CHANGE UP
GAS PNL BLDV: 134 MMOL/L — LOW (ref 136–145)
GAS PNL BLDV: SIGNIFICANT CHANGE UP
GAS PNL BLDV: SIGNIFICANT CHANGE UP
GLUCOSE BLDC GLUCOMTR-MCNC: 238 MG/DL — HIGH (ref 70–99)
GLUCOSE BLDC GLUCOMTR-MCNC: 238 MG/DL — HIGH (ref 70–99)
GLUCOSE BLDV-MCNC: 194 MG/DL — HIGH (ref 70–99)
GLUCOSE BLDV-MCNC: 194 MG/DL — HIGH (ref 70–99)
GLUCOSE BLDV-MCNC: 246 MG/DL — HIGH (ref 70–99)
GLUCOSE BLDV-MCNC: 246 MG/DL — HIGH (ref 70–99)
GLUCOSE BLDV-MCNC: 277 MG/DL — HIGH (ref 70–99)
GLUCOSE BLDV-MCNC: 277 MG/DL — HIGH (ref 70–99)
GLUCOSE SERPL-MCNC: 186 MG/DL — HIGH (ref 70–99)
GLUCOSE SERPL-MCNC: 186 MG/DL — HIGH (ref 70–99)
GLUCOSE UR QL: NEGATIVE MG/DL — SIGNIFICANT CHANGE UP
GLUCOSE UR QL: NEGATIVE MG/DL — SIGNIFICANT CHANGE UP
HADV DNA SPEC QL NAA+PROBE: SIGNIFICANT CHANGE UP
HADV DNA SPEC QL NAA+PROBE: SIGNIFICANT CHANGE UP
HCO3 BLDV-SCNC: 27 MMOL/L — SIGNIFICANT CHANGE UP (ref 22–29)
HCO3 BLDV-SCNC: 27 MMOL/L — SIGNIFICANT CHANGE UP (ref 22–29)
HCO3 BLDV-SCNC: 28 MMOL/L — SIGNIFICANT CHANGE UP (ref 22–29)
HCOV 229E RNA SPEC QL NAA+PROBE: SIGNIFICANT CHANGE UP
HCOV 229E RNA SPEC QL NAA+PROBE: SIGNIFICANT CHANGE UP
HCOV HKU1 RNA SPEC QL NAA+PROBE: SIGNIFICANT CHANGE UP
HCOV HKU1 RNA SPEC QL NAA+PROBE: SIGNIFICANT CHANGE UP
HCOV NL63 RNA SPEC QL NAA+PROBE: SIGNIFICANT CHANGE UP
HCOV NL63 RNA SPEC QL NAA+PROBE: SIGNIFICANT CHANGE UP
HCOV OC43 RNA SPEC QL NAA+PROBE: SIGNIFICANT CHANGE UP
HCOV OC43 RNA SPEC QL NAA+PROBE: SIGNIFICANT CHANGE UP
HCT VFR BLD CALC: 38.7 % — LOW (ref 39–50)
HCT VFR BLD CALC: 38.7 % — LOW (ref 39–50)
HCT VFR BLDA CALC: 37 % — LOW (ref 39–51)
HCT VFR BLDA CALC: 40 % — SIGNIFICANT CHANGE UP (ref 39–51)
HCT VFR BLDA CALC: 40 % — SIGNIFICANT CHANGE UP (ref 39–51)
HGB BLD CALC-MCNC: 12.2 G/DL — LOW (ref 12.6–17.4)
HGB BLD CALC-MCNC: 12.2 G/DL — LOW (ref 12.6–17.4)
HGB BLD CALC-MCNC: 12.4 G/DL — LOW (ref 12.6–17.4)
HGB BLD CALC-MCNC: 12.4 G/DL — LOW (ref 12.6–17.4)
HGB BLD CALC-MCNC: 13.2 G/DL — SIGNIFICANT CHANGE UP (ref 12.6–17.4)
HGB BLD CALC-MCNC: 13.2 G/DL — SIGNIFICANT CHANGE UP (ref 12.6–17.4)
HGB BLD-MCNC: 12.8 G/DL — LOW (ref 13–17)
HGB BLD-MCNC: 12.8 G/DL — LOW (ref 13–17)
HMPV RNA SPEC QL NAA+PROBE: SIGNIFICANT CHANGE UP
HMPV RNA SPEC QL NAA+PROBE: SIGNIFICANT CHANGE UP
HPIV1 RNA SPEC QL NAA+PROBE: SIGNIFICANT CHANGE UP
HPIV1 RNA SPEC QL NAA+PROBE: SIGNIFICANT CHANGE UP
HPIV2 RNA SPEC QL NAA+PROBE: SIGNIFICANT CHANGE UP
HPIV2 RNA SPEC QL NAA+PROBE: SIGNIFICANT CHANGE UP
HPIV3 RNA SPEC QL NAA+PROBE: SIGNIFICANT CHANGE UP
HPIV3 RNA SPEC QL NAA+PROBE: SIGNIFICANT CHANGE UP
HPIV4 RNA SPEC QL NAA+PROBE: SIGNIFICANT CHANGE UP
HPIV4 RNA SPEC QL NAA+PROBE: SIGNIFICANT CHANGE UP
IANC: 9.91 K/UL — HIGH (ref 1.8–7.4)
IANC: 9.91 K/UL — HIGH (ref 1.8–7.4)
IMM GRANULOCYTES NFR BLD AUTO: 0.5 % — SIGNIFICANT CHANGE UP (ref 0–0.9)
IMM GRANULOCYTES NFR BLD AUTO: 0.5 % — SIGNIFICANT CHANGE UP (ref 0–0.9)
KETONES UR-MCNC: NEGATIVE MG/DL — SIGNIFICANT CHANGE UP
KETONES UR-MCNC: NEGATIVE MG/DL — SIGNIFICANT CHANGE UP
LACTATE BLDV-MCNC: 1.9 MMOL/L — SIGNIFICANT CHANGE UP (ref 0.5–2)
LACTATE BLDV-MCNC: 1.9 MMOL/L — SIGNIFICANT CHANGE UP (ref 0.5–2)
LACTATE BLDV-MCNC: 2.3 MMOL/L — HIGH (ref 0.5–2)
LACTATE BLDV-MCNC: 2.3 MMOL/L — HIGH (ref 0.5–2)
LACTATE BLDV-MCNC: 2.7 MMOL/L — HIGH (ref 0.5–2)
LACTATE BLDV-MCNC: 2.7 MMOL/L — HIGH (ref 0.5–2)
LEUKOCYTE ESTERASE UR-ACNC: NEGATIVE — SIGNIFICANT CHANGE UP
LEUKOCYTE ESTERASE UR-ACNC: NEGATIVE — SIGNIFICANT CHANGE UP
LYMPHOCYTES # BLD AUTO: 1.84 K/UL — SIGNIFICANT CHANGE UP (ref 1–3.3)
LYMPHOCYTES # BLD AUTO: 1.84 K/UL — SIGNIFICANT CHANGE UP (ref 1–3.3)
LYMPHOCYTES # BLD AUTO: 14 % — SIGNIFICANT CHANGE UP (ref 13–44)
LYMPHOCYTES # BLD AUTO: 14 % — SIGNIFICANT CHANGE UP (ref 13–44)
M PNEUMO DNA SPEC QL NAA+PROBE: SIGNIFICANT CHANGE UP
M PNEUMO DNA SPEC QL NAA+PROBE: SIGNIFICANT CHANGE UP
MCHC RBC-ENTMCNC: 28.3 PG — SIGNIFICANT CHANGE UP (ref 27–34)
MCHC RBC-ENTMCNC: 28.3 PG — SIGNIFICANT CHANGE UP (ref 27–34)
MCHC RBC-ENTMCNC: 33.1 GM/DL — SIGNIFICANT CHANGE UP (ref 32–36)
MCHC RBC-ENTMCNC: 33.1 GM/DL — SIGNIFICANT CHANGE UP (ref 32–36)
MCV RBC AUTO: 85.6 FL — SIGNIFICANT CHANGE UP (ref 80–100)
MCV RBC AUTO: 85.6 FL — SIGNIFICANT CHANGE UP (ref 80–100)
MONOCYTES # BLD AUTO: 0.72 K/UL — SIGNIFICANT CHANGE UP (ref 0–0.9)
MONOCYTES # BLD AUTO: 0.72 K/UL — SIGNIFICANT CHANGE UP (ref 0–0.9)
MONOCYTES NFR BLD AUTO: 5.5 % — SIGNIFICANT CHANGE UP (ref 2–14)
MONOCYTES NFR BLD AUTO: 5.5 % — SIGNIFICANT CHANGE UP (ref 2–14)
NEUTROPHILS # BLD AUTO: 9.91 K/UL — HIGH (ref 1.8–7.4)
NEUTROPHILS # BLD AUTO: 9.91 K/UL — HIGH (ref 1.8–7.4)
NEUTROPHILS NFR BLD AUTO: 75.1 % — SIGNIFICANT CHANGE UP (ref 43–77)
NEUTROPHILS NFR BLD AUTO: 75.1 % — SIGNIFICANT CHANGE UP (ref 43–77)
NITRITE UR-MCNC: NEGATIVE — SIGNIFICANT CHANGE UP
NITRITE UR-MCNC: NEGATIVE — SIGNIFICANT CHANGE UP
NRBC # BLD: 0 /100 WBCS — SIGNIFICANT CHANGE UP (ref 0–0)
NRBC # BLD: 0 /100 WBCS — SIGNIFICANT CHANGE UP (ref 0–0)
NRBC # FLD: 0 K/UL — SIGNIFICANT CHANGE UP (ref 0–0)
NRBC # FLD: 0 K/UL — SIGNIFICANT CHANGE UP (ref 0–0)
NT-PROBNP SERPL-SCNC: 3313 PG/ML — HIGH
NT-PROBNP SERPL-SCNC: 3313 PG/ML — HIGH
PCO2 BLDV: 57 MMHG — HIGH (ref 42–55)
PCO2 BLDV: 57 MMHG — HIGH (ref 42–55)
PCO2 BLDV: 62 MMHG — HIGH (ref 42–55)
PCO2 BLDV: 62 MMHG — HIGH (ref 42–55)
PCO2 BLDV: 68 MMHG — HIGH (ref 42–55)
PCO2 BLDV: 68 MMHG — HIGH (ref 42–55)
PH BLDV: 7.22 — LOW (ref 7.32–7.43)
PH BLDV: 7.22 — LOW (ref 7.32–7.43)
PH BLDV: 7.26 — LOW (ref 7.32–7.43)
PH BLDV: 7.26 — LOW (ref 7.32–7.43)
PH BLDV: 7.29 — LOW (ref 7.32–7.43)
PH BLDV: 7.29 — LOW (ref 7.32–7.43)
PH UR: 5.5 — SIGNIFICANT CHANGE UP (ref 5–8)
PH UR: 5.5 — SIGNIFICANT CHANGE UP (ref 5–8)
PLATELET # BLD AUTO: 328 K/UL — SIGNIFICANT CHANGE UP (ref 150–400)
PLATELET # BLD AUTO: 328 K/UL — SIGNIFICANT CHANGE UP (ref 150–400)
PO2 BLDV: 31 MMHG — SIGNIFICANT CHANGE UP (ref 25–45)
PO2 BLDV: 31 MMHG — SIGNIFICANT CHANGE UP (ref 25–45)
PO2 BLDV: 51 MMHG — HIGH (ref 25–45)
POTASSIUM BLDV-SCNC: 4.2 MMOL/L — SIGNIFICANT CHANGE UP (ref 3.5–5.1)
POTASSIUM BLDV-SCNC: 4.2 MMOL/L — SIGNIFICANT CHANGE UP (ref 3.5–5.1)
POTASSIUM BLDV-SCNC: 4.3 MMOL/L — SIGNIFICANT CHANGE UP (ref 3.5–5.1)
POTASSIUM BLDV-SCNC: 4.3 MMOL/L — SIGNIFICANT CHANGE UP (ref 3.5–5.1)
POTASSIUM BLDV-SCNC: 4.5 MMOL/L — SIGNIFICANT CHANGE UP (ref 3.5–5.1)
POTASSIUM BLDV-SCNC: 4.5 MMOL/L — SIGNIFICANT CHANGE UP (ref 3.5–5.1)
POTASSIUM SERPL-MCNC: 4.2 MMOL/L — SIGNIFICANT CHANGE UP (ref 3.5–5.3)
POTASSIUM SERPL-MCNC: 4.2 MMOL/L — SIGNIFICANT CHANGE UP (ref 3.5–5.3)
POTASSIUM SERPL-SCNC: 4.2 MMOL/L — SIGNIFICANT CHANGE UP (ref 3.5–5.3)
POTASSIUM SERPL-SCNC: 4.2 MMOL/L — SIGNIFICANT CHANGE UP (ref 3.5–5.3)
PROCALCITONIN SERPL-MCNC: 0.07 NG/ML — SIGNIFICANT CHANGE UP (ref 0.02–0.1)
PROCALCITONIN SERPL-MCNC: 0.07 NG/ML — SIGNIFICANT CHANGE UP (ref 0.02–0.1)
PROT SERPL-MCNC: 8 G/DL — SIGNIFICANT CHANGE UP (ref 6–8.3)
PROT SERPL-MCNC: 8 G/DL — SIGNIFICANT CHANGE UP (ref 6–8.3)
PROT UR-MCNC: NEGATIVE MG/DL — SIGNIFICANT CHANGE UP
PROT UR-MCNC: NEGATIVE MG/DL — SIGNIFICANT CHANGE UP
RAPID RVP RESULT: SIGNIFICANT CHANGE UP
RAPID RVP RESULT: SIGNIFICANT CHANGE UP
RBC # BLD: 4.52 M/UL — SIGNIFICANT CHANGE UP (ref 4.2–5.8)
RBC # BLD: 4.52 M/UL — SIGNIFICANT CHANGE UP (ref 4.2–5.8)
RBC # FLD: 18.3 % — HIGH (ref 10.3–14.5)
RBC # FLD: 18.3 % — HIGH (ref 10.3–14.5)
RBC CASTS # UR COMP ASSIST: 0 /HPF — SIGNIFICANT CHANGE UP (ref 0–4)
RBC CASTS # UR COMP ASSIST: 0 /HPF — SIGNIFICANT CHANGE UP (ref 0–4)
RSV RNA SPEC QL NAA+PROBE: SIGNIFICANT CHANGE UP
RSV RNA SPEC QL NAA+PROBE: SIGNIFICANT CHANGE UP
RV+EV RNA SPEC QL NAA+PROBE: SIGNIFICANT CHANGE UP
RV+EV RNA SPEC QL NAA+PROBE: SIGNIFICANT CHANGE UP
SAO2 % BLDV: 36 % — LOW (ref 67–88)
SAO2 % BLDV: 36 % — LOW (ref 67–88)
SAO2 % BLDV: 72 % — SIGNIFICANT CHANGE UP (ref 67–88)
SAO2 % BLDV: 72 % — SIGNIFICANT CHANGE UP (ref 67–88)
SAO2 % BLDV: 72.2 % — SIGNIFICANT CHANGE UP (ref 67–88)
SAO2 % BLDV: 72.2 % — SIGNIFICANT CHANGE UP (ref 67–88)
SARS-COV-2 RNA SPEC QL NAA+PROBE: SIGNIFICANT CHANGE UP
SARS-COV-2 RNA SPEC QL NAA+PROBE: SIGNIFICANT CHANGE UP
SODIUM SERPL-SCNC: 137 MMOL/L — SIGNIFICANT CHANGE UP (ref 135–145)
SODIUM SERPL-SCNC: 137 MMOL/L — SIGNIFICANT CHANGE UP (ref 135–145)
SP GR SPEC: 1.01 — SIGNIFICANT CHANGE UP (ref 1–1.03)
SP GR SPEC: 1.01 — SIGNIFICANT CHANGE UP (ref 1–1.03)
SQUAMOUS # UR AUTO: 0 /HPF — SIGNIFICANT CHANGE UP (ref 0–5)
SQUAMOUS # UR AUTO: 0 /HPF — SIGNIFICANT CHANGE UP (ref 0–5)
TROPONIN T, HIGH SENSITIVITY RESULT: 40 NG/L — SIGNIFICANT CHANGE UP
TROPONIN T, HIGH SENSITIVITY RESULT: 40 NG/L — SIGNIFICANT CHANGE UP
TROPONIN T, HIGH SENSITIVITY RESULT: 41 NG/L — SIGNIFICANT CHANGE UP
TROPONIN T, HIGH SENSITIVITY RESULT: 41 NG/L — SIGNIFICANT CHANGE UP
UROBILINOGEN FLD QL: 0.2 MG/DL — SIGNIFICANT CHANGE UP (ref 0.2–1)
UROBILINOGEN FLD QL: 0.2 MG/DL — SIGNIFICANT CHANGE UP (ref 0.2–1)
WBC # BLD: 13.18 K/UL — HIGH (ref 3.8–10.5)
WBC # BLD: 13.18 K/UL — HIGH (ref 3.8–10.5)
WBC # FLD AUTO: 13.18 K/UL — HIGH (ref 3.8–10.5)
WBC # FLD AUTO: 13.18 K/UL — HIGH (ref 3.8–10.5)
WBC UR QL: 0 /HPF — SIGNIFICANT CHANGE UP (ref 0–5)
WBC UR QL: 0 /HPF — SIGNIFICANT CHANGE UP (ref 0–5)

## 2023-11-12 PROCEDURE — 71045 X-RAY EXAM CHEST 1 VIEW: CPT | Mod: 26

## 2023-11-12 PROCEDURE — 99291 CRITICAL CARE FIRST HOUR: CPT | Mod: GC

## 2023-11-12 PROCEDURE — 99291 CRITICAL CARE FIRST HOUR: CPT

## 2023-11-12 RX ORDER — ACETAMINOPHEN 500 MG
1000 TABLET ORAL ONCE
Refills: 0 | Status: COMPLETED | OUTPATIENT
Start: 2023-11-12 | End: 2023-11-12

## 2023-11-12 RX ORDER — FUROSEMIDE 40 MG
40 TABLET ORAL ONCE
Refills: 0 | Status: COMPLETED | OUTPATIENT
Start: 2023-11-12 | End: 2023-11-12

## 2023-11-12 RX ORDER — AZITHROMYCIN 500 MG/1
500 TABLET, FILM COATED ORAL EVERY 24 HOURS
Refills: 0 | Status: COMPLETED | OUTPATIENT
Start: 2023-11-13 | End: 2023-11-14

## 2023-11-12 RX ORDER — AZITHROMYCIN 500 MG/1
TABLET, FILM COATED ORAL
Refills: 0 | Status: COMPLETED | OUTPATIENT
Start: 2023-11-12 | End: 2023-11-15

## 2023-11-12 RX ORDER — AZITHROMYCIN 500 MG/1
500 TABLET, FILM COATED ORAL ONCE
Refills: 0 | Status: COMPLETED | OUTPATIENT
Start: 2023-11-12 | End: 2023-11-12

## 2023-11-12 RX ORDER — IPRATROPIUM/ALBUTEROL SULFATE 18-103MCG
3 AEROSOL WITH ADAPTER (GRAM) INHALATION EVERY 6 HOURS
Refills: 0 | Status: DISCONTINUED | OUTPATIENT
Start: 2023-11-12 | End: 2023-11-15

## 2023-11-12 RX ORDER — PIPERACILLIN AND TAZOBACTAM 4; .5 G/20ML; G/20ML
3.38 INJECTION, POWDER, LYOPHILIZED, FOR SOLUTION INTRAVENOUS ONCE
Refills: 0 | Status: COMPLETED | OUTPATIENT
Start: 2023-11-12 | End: 2023-11-12

## 2023-11-12 RX ADMIN — AZITHROMYCIN 500 MILLIGRAM(S): 500 TABLET, FILM COATED ORAL at 23:15

## 2023-11-12 RX ADMIN — PIPERACILLIN AND TAZOBACTAM 200 GRAM(S): 4; .5 INJECTION, POWDER, LYOPHILIZED, FOR SOLUTION INTRAVENOUS at 17:29

## 2023-11-12 RX ADMIN — PIPERACILLIN AND TAZOBACTAM 3.38 GRAM(S): 4; .5 INJECTION, POWDER, LYOPHILIZED, FOR SOLUTION INTRAVENOUS at 17:59

## 2023-11-12 RX ADMIN — Medication 400 MILLIGRAM(S): at 21:21

## 2023-11-12 RX ADMIN — Medication 40 MILLIGRAM(S): at 23:15

## 2023-11-12 RX ADMIN — Medication 40 MILLIGRAM(S): at 16:30

## 2023-11-12 NOTE — ED PROVIDER NOTE - CLINICAL SUMMARY MEDICAL DECISION MAKING FREE TEXT BOX
Arthur: 68M h/o Afib on Eliquis, CAD s/p stent, HTN, HLD, DM asthma (never intubated) BIBEMS for acute respiratory distress. Initial presentation with EMS concerning for acute hypoxemic respiratory failure in setting of COPD exacerbation though then noted to have crackles and b-lines on POCUS. Likely asthma exacerbation with subsequent SCAPE physiology leading to pulm edema. Pt without history of heart failure, though will need TTE to assess cardiac function. Will check trop and pro-bnp from ED, as well as EKG to assess for acute ischemia as cause of symptoms. Pt in AFIB but overall rate controlled with exception of slight compensatory tachycardia. Transition from EMS CPAP to BiPAP for work of breathing. No additional nebs required at this time but given lasix 40mg on arrival. Check blood gas with lactate. Will obtain RVP, CXR and blood cultures for infectious workup and empiric zosyn x1 provided. ICU eval for new NIPPV

## 2023-11-12 NOTE — PROGRESS NOTE ADULT - ASSESSMENT
in progress 67yo Romansh-speaking male pmhx Afib on Eliquis, CAD w/ stent, HTN, HLD, T2DM, Asthma, BIBEMS for respiratory distress. MICU consulted for new BPAP.    #Respiratory Distress  #Asthma exacerbation  - s/p Nebs and Magnesium  - RVP negative   67yo Serbian-speaking male pmhx Afib on Eliquis, CAD w/ stent, HTN, HLD, T2DM, Asthma, BIBEMS for respiratory distress. MICU consulted for new BPAP.    #Respiratory Distress  #Asthma exacerbation  - s/p Nebs and Magnesium  - RVP negative    RECOMMENDATIONS:     At the moment, pt does not require MICU level of care. Please reconsult as needed.   67yo Mohawk-speaking male pmhx Afib on Eliquis, CAD w/ stent, HTN, HLD, T2DM, Asthma, BIBEMS for respiratory distress. MICU consulted for new BPAP.    #Respiratory Distress  #Asthma exacerbation  - pt in respiratory distress with diffuse wheezing concerning for asthma exacerbation vs chf  - s/p Nebs and Magnesium with improvement  - RVP negative  - B-lines on POCUS concerning for pulmonary edema    RECOMMENDATIONS:   - c/w Duonebs q6h, albuterol PRN  - Prednisone 40mg QD x5 days for asthma exacerbation  - TTE to r/o CHF  - aspiration precautions while on BPAP  - wean off as tolerated    At the moment, pt does not require MICU level of care. Please reconsult as needed.    Discussed w/ MICU attending Dr. Hartman.

## 2023-11-12 NOTE — ED PROVIDER NOTE - PROGRESS NOTE DETAILS
SG reassessed patient at bedside. pulling adequate tidal volumes, still mild expiratory wheezing, but 100% HR 90s, and patient says he feels better, and wanted to eat. adm to medicine for asthma exacerbation

## 2023-11-12 NOTE — ED PROVIDER NOTE - CRITICAL CARE ATTENDING CONTRIBUTION TO CARE
Pt seen and assessed with ACP. Agree with above. In brief, pt is 68M h/o Afib on Eliquis, CAD s/p stent, HTN, HLD, DM asthma (never intubated) BIBEMS for acute respiratory distress. Pt is Serbian speaking, son providing translation and history. Per son, pt has had cough x3-4 days with worsening sob x2 days. Had been using albuterol MDI and nebs at home without much relief. No recent fevers, sick contacts or recent travels. En route, EMS found pt to be moving minimal air, placed on CPAP and given nebs x2, decadron 12mg, Mg 2g with minimal relief. Pt arrived on CPAP with mild accessory muscle use, sat 100% on CPAP with feeling subjective improved compared to prior to EMS arrival to home.   Gen: mild accessory muscle use on CPAP, providing short answers to questions with son translating  CV: tachy and irregular  Pulm: initially with diffuse wheeze, then noted to have improved air movement though with crackles b/l; POCUS with diffuse b-lines anteriorly and small pleural effusions b/l  Abd: soft, nontender, slight distension  Ext: no edema/swelling  Skin: no rash/petechiae  MDM: 68M h/o Afib on Eliquis, CAD s/p stent, HTN, HLD, DM asthma (never intubated) BIBEMS for acute respiratory distress. Initial presentation with EMS concerning for acute hypoxemic respiratory failure in setting of COPD exacerbation though then noted to have crackles and b-lines on POCUS. Likely asthma exacerbation with subsequent SCAPE physiology leading to pulm edema. Pt without history of heart failure, though will need TTE to assess cardiac function. Will check trop and pro-bnp from ED, as well as EKG to assess for acute ischemia as cause of symptoms. Pt in AFIB but overall rate controlled with exception of slight compensatory tachycardia. Transition from EMS CPAP to BiPAP for work of breathing. No additional nebs required at this time but given lasix 40mg on arrival. Check blood gas with lactate. Will obtain RVP, CXR and blood cultures for infectious workup and empiric zosyn x1 provided. ICU eval for new NIPPV    Upon my evaluation, this patient is at high risk for imminent or life threatening deterioration due to acute hypoxemic respiratory failure 2/2 asthma and pulmonary edema, as well as other active medical issues which require my direct attention, intervention, and personal management.  I have personally spent 44      discontinuous minutes  of critical care time exclusive of time spent on separate billing procedures. This includes review of laboratory data, radiology results, discussion with primary team, and monitoring for potential decompensation. Interventions were performed as documented above.

## 2023-11-12 NOTE — PROGRESS NOTE ADULT - SUBJECTIVE AND OBJECTIVE BOX
CHIEF COMPLAINT:    HPI:  67 y/o M PMH Afib on Eliquis, CAD with stent, HTN, HLD, DM asthma (never intubated) BIBEMS for acute respiratory distress. Pt has had cough x 3-4 days and sob x 2 days that significantly worsened today. Admits to abdominal distention recently. EMS administered 2 nebs, 2g Mag, 12 of decadron with mild relief. Denies fever, chills, sore throat, congestion, N/V, abdominal pain, CP, le edema, h/o chf.      PAST MEDICAL & SURGICAL HISTORY:  Type 2 diabetes mellitus      CAD (coronary artery disease)      Asthma      Essential hypertension      No significant past surgical history          FAMILY HISTORY:      SOCIAL HISTORY:  Smoking: __ packs x ___ years  EtOH Use:  Marital Status:  Occupation:  Recent Travel:  Country of Birth:  Advance Directives:    Allergies    No Known Allergies    Intolerances        HOME MEDICATIONS:    REVIEW OF SYSTEMS:  Constitutional:   Eyes:  ENT:  CV:  Resp:  GI:  :  MSK:  Integumentary:  Neurological:  Psychiatric:  Endocrine:  Hematologic/Lymphatic:  Allergic/Immunologic:  [ ] All other systems negative  [ ] Unable to assess ROS because ________    OBJECTIVE:  ICU Vital Signs Last 24 Hrs  T(C): 36.4 (12 Nov 2023 16:44), Max: 36.4 (12 Nov 2023 16:44)  T(F): 97.5 (12 Nov 2023 16:44), Max: 97.5 (12 Nov 2023 16:44)  HR: 118 (12 Nov 2023 16:32) (89 - 118)  BP: 151/105 (12 Nov 2023 16:32) (151/105 - 151/105)  BP(mean): --  ABP: --  ABP(mean): --  RR: 26 (12 Nov 2023 16:32) (26 - 26)  SpO2: 98% (12 Nov 2023 16:32) (98% - 100%)    O2 Parameters below as of 12 Nov 2023 16:32  Patient On (Oxygen Delivery Method): BiPAP/CPAP              CAPILLARY BLOOD GLUCOSE      POCT Blood Glucose.: 218 mg/dL (12 Nov 2023 16:18)      PHYSICAL EXAM:  General:   HEENT:   Lymph Nodes:  Neck:   Respiratory:   Cardiovascular:   Abdomen:   Extremities:   Skin:   Neurological:  Psychiatry:    HOSPITAL MEDICATIONS:  MEDICATIONS  (STANDING):    MEDICATIONS  (PRN):      LABS:                        12.8   13.18 )-----------( 328      ( 12 Nov 2023 16:20 )             38.7     11-12    137  |  100  |  16  ----------------------------<  186<H>  4.2   |  25  |  1.24    Ca    9.2      12 Nov 2023 16:20    TPro  8.0  /  Alb  3.9  /  TBili  0.5  /  DBili  x   /  AST  12  /  ALT  12  /  AlkPhos  125<H>  11-12      Urinalysis Basic - ( 12 Nov 2023 16:20 )    Color: x / Appearance: x / SG: x / pH: x  Gluc: 186 mg/dL / Ketone: x  / Bili: x / Urobili: x   Blood: x / Protein: x / Nitrite: x   Leuk Esterase: x / RBC: x / WBC x   Sq Epi: x / Non Sq Epi: x / Bacteria: x        Venous Blood Gas:  11-12 @ 17:44  7.26/62/51/28/72.0  VBG Lactate: 1.9  Venous Blood Gas:  11-12 @ 16:20  7.22/68/31/28/36.0  VBG Lactate: 2.7      MICROBIOLOGY:     RADIOLOGY:  [ ] Reviewed and interpreted by me    EKG: CHIEF COMPLAINT:    HPI:  67yo Czech-speaking male pmhx Afib on Eliquis, CAD w/ stent, HTN, HLD, T2DM, Asthma, BIBEMS for respiratory distress. Patient reported he had been feeling unwell for the past 3-4 days with cough and SOB. Today the patient experienced acute worsening and was in respiratory distress      PAST MEDICAL & SURGICAL HISTORY:  Type 2 diabetes mellitus      CAD (coronary artery disease)      Asthma      Essential hypertension      No significant past surgical history          FAMILY HISTORY:      SOCIAL HISTORY:  Smoking: __ packs x ___ years  EtOH Use:  Marital Status:  Occupation:  Recent Travel:  Country of Birth:  Advance Directives:    Allergies    No Known Allergies    Intolerances        HOME MEDICATIONS:    REVIEW OF SYSTEMS:  Constitutional:   Eyes:  ENT:  CV:  Resp:  GI:  :  MSK:  Integumentary:  Neurological:  Psychiatric:  Endocrine:  Hematologic/Lymphatic:  Allergic/Immunologic:  [ ] All other systems negative  [ ] Unable to assess ROS because ________    OBJECTIVE:  ICU Vital Signs Last 24 Hrs  T(C): 36.4 (12 Nov 2023 16:44), Max: 36.4 (12 Nov 2023 16:44)  T(F): 97.5 (12 Nov 2023 16:44), Max: 97.5 (12 Nov 2023 16:44)  HR: 118 (12 Nov 2023 16:32) (89 - 118)  BP: 151/105 (12 Nov 2023 16:32) (151/105 - 151/105)  BP(mean): --  ABP: --  ABP(mean): --  RR: 26 (12 Nov 2023 16:32) (26 - 26)  SpO2: 98% (12 Nov 2023 16:32) (98% - 100%)    O2 Parameters below as of 12 Nov 2023 16:32  Patient On (Oxygen Delivery Method): BiPAP/CPAP              CAPILLARY BLOOD GLUCOSE      POCT Blood Glucose.: 218 mg/dL (12 Nov 2023 16:18)      PHYSICAL EXAM:  General:   HEENT:   Lymph Nodes:  Neck:   Respiratory:   Cardiovascular:   Abdomen:   Extremities:   Skin:   Neurological:  Psychiatry:    HOSPITAL MEDICATIONS:  MEDICATIONS  (STANDING):    MEDICATIONS  (PRN):      LABS:                        12.8   13.18 )-----------( 328      ( 12 Nov 2023 16:20 )             38.7     11-12    137  |  100  |  16  ----------------------------<  186<H>  4.2   |  25  |  1.24    Ca    9.2      12 Nov 2023 16:20    TPro  8.0  /  Alb  3.9  /  TBili  0.5  /  DBili  x   /  AST  12  /  ALT  12  /  AlkPhos  125<H>  11-12      Urinalysis Basic - ( 12 Nov 2023 16:20 )    Color: x / Appearance: x / SG: x / pH: x  Gluc: 186 mg/dL / Ketone: x  / Bili: x / Urobili: x   Blood: x / Protein: x / Nitrite: x   Leuk Esterase: x / RBC: x / WBC x   Sq Epi: x / Non Sq Epi: x / Bacteria: x        Venous Blood Gas:  11-12 @ 17:44  7.26/62/51/28/72.0  VBG Lactate: 1.9  Venous Blood Gas:  11-12 @ 16:20  7.22/68/31/28/36.0  VBG Lactate: 2.7      MICROBIOLOGY:     RADIOLOGY:  [ ] Reviewed and interpreted by me    EKG: CHIEF COMPLAINT:    HPI:  67yo Frisian-speaking male pmhx Afib on Eliquis, CAD w/ stent, HTN, HLD, T2DM, Asthma, BIBEMS for respiratory distress. Patient reported he had been feeling unwell for the past 3-4 days with cough and SOB. At home he was using his rescue Albuterol with minimal relief. Today the patient experienced acute worsening and was in respiratory distress and EMS was called. Patient denied recent travel, sick contacts. EMS placed him on CPAP and he received Nebs x2, Mag 2g, Decadron 12mg with minimal relief.     ED COURSE  In the ED pt w/ B-lines on pocus, noted to have crackles. Switched to BPAP, received Lasix 40mg.       PAST MEDICAL & SURGICAL HISTORY:  Type 2 diabetes mellitus      CAD (coronary artery disease)  Asthma  Essential hypertension  No significant past surgical history              SOCIAL HISTORY:  Lives with family. Independent in adls.     Allergies    No Known Allergies    Intolerances        HOME MEDICATIONS:    REVIEW OF SYSTEMS:  Constitutional:   Eyes:  ENT:  CV:  Resp: cough, SOB  GI:  :  MSK:  Integumentary:  Neurological:  Psychiatric:  Endocrine:  Hematologic/Lymphatic:  Allergic/Immunologic:  [x ] All other systems negative  [ ] Unable to assess ROS because ________    OBJECTIVE:  ICU Vital Signs Last 24 Hrs  T(C): 36.4 (12 Nov 2023 16:44), Max: 36.4 (12 Nov 2023 16:44)  T(F): 97.5 (12 Nov 2023 16:44), Max: 97.5 (12 Nov 2023 16:44)  HR: 118 (12 Nov 2023 16:32) (89 - 118)  BP: 151/105 (12 Nov 2023 16:32) (151/105 - 151/105)  BP(mean): --  ABP: --  ABP(mean): --  RR: 26 (12 Nov 2023 16:32) (26 - 26)  SpO2: 98% (12 Nov 2023 16:32) (98% - 100%)    O2 Parameters below as of 12 Nov 2023 16:32  Patient On (Oxygen Delivery Method): BiPAP/CPAP              CAPILLARY BLOOD GLUCOSE      POCT Blood Glucose.: 218 mg/dL (12 Nov 2023 16:18)      PHYSICAL EXAM:  GENERAL: NAD, lying comfortably in bed   HEAD:  Atraumatic, Normocephalic  EYES: EOMI b/l, PERRLA b/l, conjunctiva and sclera clear  NECK: Supple, No JVD, No LAD   CHEST/LUNG: diffuse wheezes bilaterally, crackles at bases, no rhonchi  HEART: Regular rate and rhythm; S1 and S2 present, No murmurs, rubs, or gallops  ABDOMEN: Soft, Nontender, Nondistended; Bowel sounds present  EXTREMITIES:  2+ Peripheral Pulses, No clubbing, cyanosis, or edema  NEURO: AAOx3, non-focal   SKIN: No rashes or lesions      HOSPITAL MEDICATIONS:  MEDICATIONS  (STANDING):    MEDICATIONS  (PRN):      LABS:                        12.8   13.18 )-----------( 328      ( 12 Nov 2023 16:20 )             38.7     11-12    137  |  100  |  16  ----------------------------<  186<H>  4.2   |  25  |  1.24    Ca    9.2      12 Nov 2023 16:20    TPro  8.0  /  Alb  3.9  /  TBili  0.5  /  DBili  x   /  AST  12  /  ALT  12  /  AlkPhos  125<H>  11-12      Urinalysis Basic - ( 12 Nov 2023 16:20 )    Color: x / Appearance: x / SG: x / pH: x  Gluc: 186 mg/dL / Ketone: x  / Bili: x / Urobili: x   Blood: x / Protein: x / Nitrite: x   Leuk Esterase: x / RBC: x / WBC x   Sq Epi: x / Non Sq Epi: x / Bacteria: x        Venous Blood Gas:  11-12 @ 17:44  7.26/62/51/28/72.0  VBG Lactate: 1.9  Venous Blood Gas:  11-12 @ 16:20  7.22/68/31/28/36.0  VBG Lactate: 2.7      MICROBIOLOGY:     RADIOLOGY:  [ ] Reviewed and interpreted by me    EKG: CHIEF COMPLAINT:    HPI:  67yo Arabic-speaking male pmhx Afib on Eliquis, CAD w/ stent, HTN, HLD, T2DM, Asthma, BIBEMS for respiratory distress. Patient reported he had been feeling unwell for the past 3-4 days with cough and SOB. At home he was using his rescue Albuterol with minimal relief. Today the patient experienced acute worsening and was in respiratory distress and EMS was called. Patient denied recent travel, sick contacts. EMS placed him on CPAP and he received Nebs x2, Mag 2g, Decadron 12mg with minimal relief.     ED COURSE  In the ED pt w/ B-lines on pocus, noted to have crackles. Switched to BPAP, received Lasix 40mg.       PAST MEDICAL & SURGICAL HISTORY:  Type 2 diabetes mellitus      CAD (coronary artery disease)  Asthma  Essential hypertension  No significant past surgical history      SOCIAL HISTORY:  Lives with family. Independent in adls.     Allergies  No Known Allergies  Intolerances        HOME MEDICATIONS:    REVIEW OF SYSTEMS:  Constitutional:   Eyes:  ENT:  CV:  Resp: cough, SOB  GI:  :  MSK:  Integumentary:  Neurological:  Psychiatric:  Endocrine:  Hematologic/Lymphatic:  Allergic/Immunologic:  [x ] All other systems negative  [ ] Unable to assess ROS because ________    OBJECTIVE:  ICU Vital Signs Last 24 Hrs  T(C): 36.4 (12 Nov 2023 16:44), Max: 36.4 (12 Nov 2023 16:44)  T(F): 97.5 (12 Nov 2023 16:44), Max: 97.5 (12 Nov 2023 16:44)  HR: 118 (12 Nov 2023 16:32) (89 - 118)  BP: 151/105 (12 Nov 2023 16:32) (151/105 - 151/105)  BP(mean): --  ABP: --  ABP(mean): --  RR: 26 (12 Nov 2023 16:32) (26 - 26)  SpO2: 98% (12 Nov 2023 16:32) (98% - 100%)    O2 Parameters below as of 12 Nov 2023 16:32  Patient On (Oxygen Delivery Method): BiPAP/CPAP              CAPILLARY BLOOD GLUCOSE      POCT Blood Glucose.: 218 mg/dL (12 Nov 2023 16:18)      PHYSICAL EXAM:  GENERAL: NAD, lying comfortably in bed   HEAD:  Atraumatic, Normocephalic  EYES: EOMI b/l, PERRLA b/l, conjunctiva and sclera clear  NECK: Supple, No JVD, No LAD   CHEST/LUNG: diffuse wheezes bilaterally, crackles at bases, no rhonchi  HEART: Regular rate and rhythm; S1 and S2 present, No murmurs, rubs, or gallops  ABDOMEN: Soft, Nontender, Nondistended; Bowel sounds present  EXTREMITIES:  2+ Peripheral Pulses, No clubbing, cyanosis, or edema  NEURO: AAOx3, non-focal   SKIN: No rashes or lesions      HOSPITAL MEDICATIONS:  MEDICATIONS  (STANDING):    MEDICATIONS  (PRN):      LABS:                        12.8   13.18 )-----------( 328      ( 12 Nov 2023 16:20 )             38.7     11-12    137  |  100  |  16  ----------------------------<  186<H>  4.2   |  25  |  1.24    Ca    9.2      12 Nov 2023 16:20    TPro  8.0  /  Alb  3.9  /  TBili  0.5  /  DBili  x   /  AST  12  /  ALT  12  /  AlkPhos  125<H>  11-12      Urinalysis Basic - ( 12 Nov 2023 16:20 )    Color: x / Appearance: x / SG: x / pH: x  Gluc: 186 mg/dL / Ketone: x  / Bili: x / Urobili: x   Blood: x / Protein: x / Nitrite: x   Leuk Esterase: x / RBC: x / WBC x   Sq Epi: x / Non Sq Epi: x / Bacteria: x        Venous Blood Gas:  11-12 @ 17:44  7.26/62/51/28/72.0  VBG Lactate: 1.9  Venous Blood Gas:  11-12 @ 16:20  7.22/68/31/28/36.0  VBG Lactate: 2.7      MICROBIOLOGY:     RADIOLOGY:  [ ] Reviewed and interpreted by me    EKG:

## 2023-11-12 NOTE — ED PROVIDER NOTE - OBJECTIVE STATEMENT
Pt is Arabic speaking, prefers son to translate.  69 y/o M PMH Afib on Eliquis, CAD with stent, HTN, HLD, DM asthma (never intubated) BIB EMS for acute respiratory distress. Pt has had cough x 3-4 days and sob x 2 days that significantly worsened today. Admits to abdominal distention recently. EMS administered 2 nebs, 2g Mag, 12 of decadron with mild relief. Denies fever, chills, sore throat, congestion, N/V, abdominal pain, CP, le edema, h/o chf.

## 2023-11-12 NOTE — ED PROVIDER NOTE - CONSTITUTIONAL, MLM
normal... awake, alert, oriented to person, place, time/situation and in moderate-severe respiratory distress on CPAP with EMS

## 2023-11-12 NOTE — ED ADULT TRIAGE NOTE - INTERNATIONAL TRAVEL
Patient would like a call back regarding results and states dermatology office was closed.    Patient wondered if Dr. Moy nurse could give her those results this evening.    Please advise.       Unable to Assess

## 2023-11-12 NOTE — ED ADULT NURSE REASSESSMENT NOTE - NS ED NURSE REASSESS COMMENT FT1
Pt received at change of shift. Pt laying in bed, NAD, respirations even and unlabored, Afib on the monitor. VS in flow sheet. Pt medicated per MD orders. Pt denies chest pain, headache, dizziness, weakness, fever, chills, N/V/D,  symptoms. Report given to [facility] 1 Lisa GUZMAN, awaiting transport by RN.

## 2023-11-12 NOTE — ED ADULT NURSE NOTE - OBJECTIVE STATEMENT
Facilitator RN: Patient presents to ED via ambulance for SOB, asthma exacerbation. Son at bedside. He is A&Ox4, tachypneic, breathing labored on the CPAP machine. As per son, patient had been increasingly short of breath over the past 2 days and "asthma acting up." Patient denies CP, dizziness, N/V, fevers, chills. No pallor, no cyanosis noted. Peripheral pulses adriano. lower and upper extremities intact +2. History of asthma, HTN, CAD, DM2. Per son, current smoker of cigarettes. On cardiac monitor showing ST in 110s to 120s bpm. 18G IVs in placed adriano. AC by EMS. Labs drawn and sent. Medicated as ordered. Facilitator RN: Patient presents to ED via ambulance for SOB, asthma exacerbation. Son at bedside. He is A&Ox4, tachypneic, breathing labored on the CPAP machine. As per son, patient had been increasingly short of breath over the past 2 days and "asthma acting up." Patient denies CP, dizziness, N/V, fevers, chills. No pallor, no cyanosis noted. Peripheral pulses adriano. lower and upper extremities intact +2. History of asthma, HTN, CAD, DM2. Per son, current smoker of cigarettes. On cardiac monitor showing ST in 110s to 120s bpm. 18G IVs in placed adriano. AC by EMS. Labs drawn and sent. Medicated as ordered. Endorsed to primary RN.

## 2023-11-12 NOTE — ED ADULT NURSE REASSESSMENT NOTE - NS ED NURSE REASSESS COMMENT FT1
Break coverage RN: Received patient in room 16. Patient resting in stretcher, no distress noted. Patient denies any pain/discomfort at this time. Patient is on cardiac monitor AFIB w/O2 sat 100% on a BIPAP. Side rails up and safety maintained. Fall precaution in place. Family at bedside. Break coverage RN: Received patient in room 16. Patient resting in stretcher, no distress noted. Patient denies any pain/discomfort at this time. Patient is on cardiac monitor sinus tachycardia w/O2 sat 100% on a BIPAP. Side rails up and safety maintained. Fall precaution in place. Family at bedside.

## 2023-11-13 DIAGNOSIS — Z29.9 ENCOUNTER FOR PROPHYLACTIC MEASURES, UNSPECIFIED: ICD-10-CM

## 2023-11-13 DIAGNOSIS — R79.89 OTHER SPECIFIED ABNORMAL FINDINGS OF BLOOD CHEMISTRY: ICD-10-CM

## 2023-11-13 DIAGNOSIS — I50.9 HEART FAILURE, UNSPECIFIED: ICD-10-CM

## 2023-11-13 DIAGNOSIS — I25.10 ATHEROSCLEROTIC HEART DISEASE OF NATIVE CORONARY ARTERY WITHOUT ANGINA PECTORIS: ICD-10-CM

## 2023-11-13 DIAGNOSIS — R31.9 HEMATURIA, UNSPECIFIED: ICD-10-CM

## 2023-11-13 DIAGNOSIS — J44.1 CHRONIC OBSTRUCTIVE PULMONARY DISEASE WITH (ACUTE) EXACERBATION: ICD-10-CM

## 2023-11-13 DIAGNOSIS — E11.9 TYPE 2 DIABETES MELLITUS WITHOUT COMPLICATIONS: ICD-10-CM

## 2023-11-13 DIAGNOSIS — J96.02 ACUTE RESPIRATORY FAILURE WITH HYPERCAPNIA: ICD-10-CM

## 2023-11-13 DIAGNOSIS — F17.200 NICOTINE DEPENDENCE, UNSPECIFIED, UNCOMPLICATED: ICD-10-CM

## 2023-11-13 DIAGNOSIS — K31.84 GASTROPARESIS: ICD-10-CM

## 2023-11-13 DIAGNOSIS — I10 ESSENTIAL (PRIMARY) HYPERTENSION: ICD-10-CM

## 2023-11-13 DIAGNOSIS — I48.91 UNSPECIFIED ATRIAL FIBRILLATION: ICD-10-CM

## 2023-11-13 LAB
ALBUMIN SERPL ELPH-MCNC: 3.8 G/DL — SIGNIFICANT CHANGE UP (ref 3.3–5)
ALBUMIN SERPL ELPH-MCNC: 3.8 G/DL — SIGNIFICANT CHANGE UP (ref 3.3–5)
ALP SERPL-CCNC: 115 U/L — SIGNIFICANT CHANGE UP (ref 40–120)
ALP SERPL-CCNC: 115 U/L — SIGNIFICANT CHANGE UP (ref 40–120)
ALT FLD-CCNC: 6 U/L — SIGNIFICANT CHANGE UP (ref 4–41)
ALT FLD-CCNC: 6 U/L — SIGNIFICANT CHANGE UP (ref 4–41)
ANION GAP SERPL CALC-SCNC: 15 MMOL/L — HIGH (ref 7–14)
ANION GAP SERPL CALC-SCNC: 15 MMOL/L — HIGH (ref 7–14)
AST SERPL-CCNC: 7 U/L — SIGNIFICANT CHANGE UP (ref 4–40)
AST SERPL-CCNC: 7 U/L — SIGNIFICANT CHANGE UP (ref 4–40)
BASE EXCESS BLDV CALC-SCNC: 0.3 MMOL/L — SIGNIFICANT CHANGE UP (ref -2–3)
BASE EXCESS BLDV CALC-SCNC: 0.3 MMOL/L — SIGNIFICANT CHANGE UP (ref -2–3)
BASE EXCESS BLDV CALC-SCNC: 3.4 MMOL/L — HIGH (ref -2–3)
BASE EXCESS BLDV CALC-SCNC: 3.4 MMOL/L — HIGH (ref -2–3)
BASOPHILS # BLD AUTO: 0.01 K/UL — SIGNIFICANT CHANGE UP (ref 0–0.2)
BASOPHILS # BLD AUTO: 0.01 K/UL — SIGNIFICANT CHANGE UP (ref 0–0.2)
BASOPHILS NFR BLD AUTO: 0.1 % — SIGNIFICANT CHANGE UP (ref 0–2)
BASOPHILS NFR BLD AUTO: 0.1 % — SIGNIFICANT CHANGE UP (ref 0–2)
BILIRUB SERPL-MCNC: 0.5 MG/DL — SIGNIFICANT CHANGE UP (ref 0.2–1.2)
BILIRUB SERPL-MCNC: 0.5 MG/DL — SIGNIFICANT CHANGE UP (ref 0.2–1.2)
BLOOD GAS VENOUS COMPREHENSIVE RESULT: SIGNIFICANT CHANGE UP
BLOOD GAS VENOUS COMPREHENSIVE RESULT: SIGNIFICANT CHANGE UP
BUN SERPL-MCNC: 22 MG/DL — SIGNIFICANT CHANGE UP (ref 7–23)
BUN SERPL-MCNC: 22 MG/DL — SIGNIFICANT CHANGE UP (ref 7–23)
CALCIUM SERPL-MCNC: 9 MG/DL — SIGNIFICANT CHANGE UP (ref 8.4–10.5)
CALCIUM SERPL-MCNC: 9 MG/DL — SIGNIFICANT CHANGE UP (ref 8.4–10.5)
CHLORIDE BLDV-SCNC: 97 MMOL/L — SIGNIFICANT CHANGE UP (ref 96–108)
CHLORIDE BLDV-SCNC: 97 MMOL/L — SIGNIFICANT CHANGE UP (ref 96–108)
CHLORIDE SERPL-SCNC: 95 MMOL/L — LOW (ref 98–107)
CHLORIDE SERPL-SCNC: 95 MMOL/L — LOW (ref 98–107)
CO2 BLDV-SCNC: 28 MMOL/L — HIGH (ref 22–26)
CO2 BLDV-SCNC: 28 MMOL/L — HIGH (ref 22–26)
CO2 BLDV-SCNC: 32.1 MMOL/L — HIGH (ref 22–26)
CO2 BLDV-SCNC: 32.1 MMOL/L — HIGH (ref 22–26)
CO2 SERPL-SCNC: 25 MMOL/L — SIGNIFICANT CHANGE UP (ref 22–31)
CO2 SERPL-SCNC: 25 MMOL/L — SIGNIFICANT CHANGE UP (ref 22–31)
CREAT SERPL-MCNC: 1.27 MG/DL — SIGNIFICANT CHANGE UP (ref 0.5–1.3)
CREAT SERPL-MCNC: 1.27 MG/DL — SIGNIFICANT CHANGE UP (ref 0.5–1.3)
EGFR: 61 ML/MIN/1.73M2 — SIGNIFICANT CHANGE UP
EGFR: 61 ML/MIN/1.73M2 — SIGNIFICANT CHANGE UP
EOSINOPHIL # BLD AUTO: 0 K/UL — SIGNIFICANT CHANGE UP (ref 0–0.5)
EOSINOPHIL # BLD AUTO: 0 K/UL — SIGNIFICANT CHANGE UP (ref 0–0.5)
EOSINOPHIL NFR BLD AUTO: 0 % — SIGNIFICANT CHANGE UP (ref 0–6)
EOSINOPHIL NFR BLD AUTO: 0 % — SIGNIFICANT CHANGE UP (ref 0–6)
GAS PNL BLDV: 133 MMOL/L — LOW (ref 136–145)
GAS PNL BLDV: 133 MMOL/L — LOW (ref 136–145)
GAS PNL BLDV: SIGNIFICANT CHANGE UP
GAS PNL BLDV: SIGNIFICANT CHANGE UP
GLUCOSE BLDC GLUCOMTR-MCNC: 190 MG/DL — HIGH (ref 70–99)
GLUCOSE BLDC GLUCOMTR-MCNC: 190 MG/DL — HIGH (ref 70–99)
GLUCOSE BLDC GLUCOMTR-MCNC: 260 MG/DL — HIGH (ref 70–99)
GLUCOSE BLDC GLUCOMTR-MCNC: 260 MG/DL — HIGH (ref 70–99)
GLUCOSE BLDC GLUCOMTR-MCNC: 264 MG/DL — HIGH (ref 70–99)
GLUCOSE BLDC GLUCOMTR-MCNC: 264 MG/DL — HIGH (ref 70–99)
GLUCOSE BLDC GLUCOMTR-MCNC: 268 MG/DL — HIGH (ref 70–99)
GLUCOSE BLDC GLUCOMTR-MCNC: 268 MG/DL — HIGH (ref 70–99)
GLUCOSE BLDC GLUCOMTR-MCNC: 423 MG/DL — HIGH (ref 70–99)
GLUCOSE BLDC GLUCOMTR-MCNC: 423 MG/DL — HIGH (ref 70–99)
GLUCOSE BLDV-MCNC: 453 MG/DL — CRITICAL HIGH (ref 70–99)
GLUCOSE BLDV-MCNC: 453 MG/DL — CRITICAL HIGH (ref 70–99)
GLUCOSE SERPL-MCNC: 248 MG/DL — HIGH (ref 70–99)
GLUCOSE SERPL-MCNC: 248 MG/DL — HIGH (ref 70–99)
HCO3 BLDV-SCNC: 26 MMOL/L — SIGNIFICANT CHANGE UP (ref 22–29)
HCO3 BLDV-SCNC: 26 MMOL/L — SIGNIFICANT CHANGE UP (ref 22–29)
HCO3 BLDV-SCNC: 30 MMOL/L — HIGH (ref 22–29)
HCO3 BLDV-SCNC: 30 MMOL/L — HIGH (ref 22–29)
HCT VFR BLD CALC: 36.9 % — LOW (ref 39–50)
HCT VFR BLD CALC: 36.9 % — LOW (ref 39–50)
HCT VFR BLDA CALC: 38 % — LOW (ref 39–51)
HCT VFR BLDA CALC: 38 % — LOW (ref 39–51)
HGB BLD CALC-MCNC: 12.7 G/DL — SIGNIFICANT CHANGE UP (ref 12.6–17.4)
HGB BLD CALC-MCNC: 12.7 G/DL — SIGNIFICANT CHANGE UP (ref 12.6–17.4)
HGB BLD-MCNC: 12.5 G/DL — LOW (ref 13–17)
HGB BLD-MCNC: 12.5 G/DL — LOW (ref 13–17)
IANC: 7.94 K/UL — HIGH (ref 1.8–7.4)
IANC: 7.94 K/UL — HIGH (ref 1.8–7.4)
IMM GRANULOCYTES NFR BLD AUTO: 0.9 % — SIGNIFICANT CHANGE UP (ref 0–0.9)
IMM GRANULOCYTES NFR BLD AUTO: 0.9 % — SIGNIFICANT CHANGE UP (ref 0–0.9)
LACTATE BLDV-MCNC: 2.9 MMOL/L — HIGH (ref 0.5–2)
LACTATE BLDV-MCNC: 2.9 MMOL/L — HIGH (ref 0.5–2)
LACTATE BLDV-MCNC: 3.3 MMOL/L — HIGH (ref 0.5–2)
LACTATE BLDV-MCNC: 3.3 MMOL/L — HIGH (ref 0.5–2)
LACTATE BLDV-MCNC: 3.4 MMOL/L — HIGH (ref 0.5–2)
LACTATE BLDV-MCNC: 3.4 MMOL/L — HIGH (ref 0.5–2)
LYMPHOCYTES # BLD AUTO: 0.82 K/UL — LOW (ref 1–3.3)
LYMPHOCYTES # BLD AUTO: 0.82 K/UL — LOW (ref 1–3.3)
LYMPHOCYTES # BLD AUTO: 9.2 % — LOW (ref 13–44)
LYMPHOCYTES # BLD AUTO: 9.2 % — LOW (ref 13–44)
MAGNESIUM SERPL-MCNC: 2.4 MG/DL — SIGNIFICANT CHANGE UP (ref 1.6–2.6)
MAGNESIUM SERPL-MCNC: 2.4 MG/DL — SIGNIFICANT CHANGE UP (ref 1.6–2.6)
MCHC RBC-ENTMCNC: 28 PG — SIGNIFICANT CHANGE UP (ref 27–34)
MCHC RBC-ENTMCNC: 28 PG — SIGNIFICANT CHANGE UP (ref 27–34)
MCHC RBC-ENTMCNC: 33.9 GM/DL — SIGNIFICANT CHANGE UP (ref 32–36)
MCHC RBC-ENTMCNC: 33.9 GM/DL — SIGNIFICANT CHANGE UP (ref 32–36)
MCV RBC AUTO: 82.6 FL — SIGNIFICANT CHANGE UP (ref 80–100)
MCV RBC AUTO: 82.6 FL — SIGNIFICANT CHANGE UP (ref 80–100)
MONOCYTES # BLD AUTO: 0.08 K/UL — SIGNIFICANT CHANGE UP (ref 0–0.9)
MONOCYTES # BLD AUTO: 0.08 K/UL — SIGNIFICANT CHANGE UP (ref 0–0.9)
MONOCYTES NFR BLD AUTO: 0.9 % — LOW (ref 2–14)
MONOCYTES NFR BLD AUTO: 0.9 % — LOW (ref 2–14)
NEUTROPHILS # BLD AUTO: 7.94 K/UL — HIGH (ref 1.8–7.4)
NEUTROPHILS # BLD AUTO: 7.94 K/UL — HIGH (ref 1.8–7.4)
NEUTROPHILS NFR BLD AUTO: 88.9 % — HIGH (ref 43–77)
NEUTROPHILS NFR BLD AUTO: 88.9 % — HIGH (ref 43–77)
NRBC # BLD: 0 /100 WBCS — SIGNIFICANT CHANGE UP (ref 0–0)
NRBC # BLD: 0 /100 WBCS — SIGNIFICANT CHANGE UP (ref 0–0)
NRBC # FLD: 0 K/UL — SIGNIFICANT CHANGE UP (ref 0–0)
NRBC # FLD: 0 K/UL — SIGNIFICANT CHANGE UP (ref 0–0)
PCO2 BLDV: 48 MMHG — SIGNIFICANT CHANGE UP (ref 42–55)
PCO2 BLDV: 48 MMHG — SIGNIFICANT CHANGE UP (ref 42–55)
PCO2 BLDV: 55 MMHG — SIGNIFICANT CHANGE UP (ref 42–55)
PCO2 BLDV: 55 MMHG — SIGNIFICANT CHANGE UP (ref 42–55)
PH BLDV: 7.35 — SIGNIFICANT CHANGE UP (ref 7.32–7.43)
PHOSPHATE SERPL-MCNC: 5.4 MG/DL — HIGH (ref 2.5–4.5)
PHOSPHATE SERPL-MCNC: 5.4 MG/DL — HIGH (ref 2.5–4.5)
PLATELET # BLD AUTO: 262 K/UL — SIGNIFICANT CHANGE UP (ref 150–400)
PLATELET # BLD AUTO: 262 K/UL — SIGNIFICANT CHANGE UP (ref 150–400)
PO2 BLDV: 144 MMHG — HIGH (ref 25–45)
PO2 BLDV: 144 MMHG — HIGH (ref 25–45)
PO2 BLDV: 48 MMHG — HIGH (ref 25–45)
PO2 BLDV: 48 MMHG — HIGH (ref 25–45)
POTASSIUM BLDV-SCNC: 3.9 MMOL/L — SIGNIFICANT CHANGE UP (ref 3.5–5.1)
POTASSIUM BLDV-SCNC: 3.9 MMOL/L — SIGNIFICANT CHANGE UP (ref 3.5–5.1)
POTASSIUM SERPL-MCNC: 4.2 MMOL/L — SIGNIFICANT CHANGE UP (ref 3.5–5.3)
POTASSIUM SERPL-MCNC: 4.2 MMOL/L — SIGNIFICANT CHANGE UP (ref 3.5–5.3)
POTASSIUM SERPL-SCNC: 4.2 MMOL/L — SIGNIFICANT CHANGE UP (ref 3.5–5.3)
POTASSIUM SERPL-SCNC: 4.2 MMOL/L — SIGNIFICANT CHANGE UP (ref 3.5–5.3)
PROT SERPL-MCNC: 7.6 G/DL — SIGNIFICANT CHANGE UP (ref 6–8.3)
PROT SERPL-MCNC: 7.6 G/DL — SIGNIFICANT CHANGE UP (ref 6–8.3)
RBC # BLD: 4.47 M/UL — SIGNIFICANT CHANGE UP (ref 4.2–5.8)
RBC # BLD: 4.47 M/UL — SIGNIFICANT CHANGE UP (ref 4.2–5.8)
RBC # FLD: 17.7 % — HIGH (ref 10.3–14.5)
RBC # FLD: 17.7 % — HIGH (ref 10.3–14.5)
SAO2 % BLDV: 70.8 % — SIGNIFICANT CHANGE UP (ref 67–88)
SAO2 % BLDV: 70.8 % — SIGNIFICANT CHANGE UP (ref 67–88)
SAO2 % BLDV: 98.1 % — HIGH (ref 67–88)
SAO2 % BLDV: 98.1 % — HIGH (ref 67–88)
SODIUM SERPL-SCNC: 135 MMOL/L — SIGNIFICANT CHANGE UP (ref 135–145)
SODIUM SERPL-SCNC: 135 MMOL/L — SIGNIFICANT CHANGE UP (ref 135–145)
WBC # BLD: 8.93 K/UL — SIGNIFICANT CHANGE UP (ref 3.8–10.5)
WBC # BLD: 8.93 K/UL — SIGNIFICANT CHANGE UP (ref 3.8–10.5)
WBC # FLD AUTO: 8.93 K/UL — SIGNIFICANT CHANGE UP (ref 3.8–10.5)
WBC # FLD AUTO: 8.93 K/UL — SIGNIFICANT CHANGE UP (ref 3.8–10.5)

## 2023-11-13 PROCEDURE — 99223 1ST HOSP IP/OBS HIGH 75: CPT | Mod: GC

## 2023-11-13 RX ORDER — INSULIN LISPRO 100/ML
VIAL (ML) SUBCUTANEOUS
Refills: 0 | Status: DISCONTINUED | OUTPATIENT
Start: 2023-11-13 | End: 2023-11-16

## 2023-11-13 RX ORDER — INSULIN GLARGINE 100 [IU]/ML
10 INJECTION, SOLUTION SUBCUTANEOUS AT BEDTIME
Refills: 0 | Status: DISCONTINUED | OUTPATIENT
Start: 2023-11-13 | End: 2023-11-13

## 2023-11-13 RX ORDER — METOCLOPRAMIDE HCL 10 MG
10 TABLET ORAL AT BEDTIME
Refills: 0 | Status: DISCONTINUED | OUTPATIENT
Start: 2023-11-13 | End: 2023-11-16

## 2023-11-13 RX ORDER — DEXTROSE 50 % IN WATER 50 %
25 SYRINGE (ML) INTRAVENOUS ONCE
Refills: 0 | Status: DISCONTINUED | OUTPATIENT
Start: 2023-11-13 | End: 2023-11-16

## 2023-11-13 RX ORDER — BUDESONIDE AND FORMOTEROL FUMARATE DIHYDRATE 160; 4.5 UG/1; UG/1
2 AEROSOL RESPIRATORY (INHALATION)
Refills: 0 | Status: DISCONTINUED | OUTPATIENT
Start: 2023-11-13 | End: 2023-11-16

## 2023-11-13 RX ORDER — MONTELUKAST 4 MG/1
1 TABLET, CHEWABLE ORAL
Qty: 0 | Refills: 0 | DISCHARGE

## 2023-11-13 RX ORDER — BUDESONIDE AND FORMOTEROL FUMARATE DIHYDRATE 160; 4.5 UG/1; UG/1
2 AEROSOL RESPIRATORY (INHALATION)
Qty: 0 | Refills: 0 | DISCHARGE

## 2023-11-13 RX ORDER — METFORMIN HYDROCHLORIDE 850 MG/1
1 TABLET ORAL
Qty: 0 | Refills: 0 | DISCHARGE

## 2023-11-13 RX ORDER — METOCLOPRAMIDE HCL 10 MG
1 TABLET ORAL
Qty: 0 | Refills: 0 | DISCHARGE

## 2023-11-13 RX ORDER — INSULIN LISPRO 100/ML
VIAL (ML) SUBCUTANEOUS
Refills: 0 | Status: DISCONTINUED | OUTPATIENT
Start: 2023-11-13 | End: 2023-11-13

## 2023-11-13 RX ORDER — TAMSULOSIN HYDROCHLORIDE 0.4 MG/1
0.4 CAPSULE ORAL AT BEDTIME
Refills: 0 | Status: DISCONTINUED | OUTPATIENT
Start: 2023-11-13 | End: 2023-11-16

## 2023-11-13 RX ORDER — MONTELUKAST 4 MG/1
10 TABLET, CHEWABLE ORAL DAILY
Refills: 0 | Status: DISCONTINUED | OUTPATIENT
Start: 2023-11-13 | End: 2023-11-16

## 2023-11-13 RX ORDER — FUROSEMIDE 40 MG
40 TABLET ORAL
Refills: 0 | Status: DISCONTINUED | OUTPATIENT
Start: 2023-11-13 | End: 2023-11-13

## 2023-11-13 RX ORDER — INSULIN GLARGINE 100 [IU]/ML
15 INJECTION, SOLUTION SUBCUTANEOUS AT BEDTIME
Refills: 0 | Status: DISCONTINUED | OUTPATIENT
Start: 2023-11-13 | End: 2023-11-14

## 2023-11-13 RX ORDER — INSULIN LISPRO 100/ML
5 VIAL (ML) SUBCUTANEOUS ONCE
Refills: 0 | Status: COMPLETED | OUTPATIENT
Start: 2023-11-13 | End: 2023-11-13

## 2023-11-13 RX ORDER — LOSARTAN POTASSIUM 100 MG/1
1 TABLET, FILM COATED ORAL
Qty: 0 | Refills: 0 | DISCHARGE

## 2023-11-13 RX ORDER — AMLODIPINE BESYLATE 2.5 MG/1
1 TABLET ORAL
Qty: 0 | Refills: 0 | DISCHARGE

## 2023-11-13 RX ORDER — INSULIN LISPRO 100/ML
VIAL (ML) SUBCUTANEOUS AT BEDTIME
Refills: 0 | Status: DISCONTINUED | OUTPATIENT
Start: 2023-11-13 | End: 2023-11-13

## 2023-11-13 RX ORDER — DEXTROSE 50 % IN WATER 50 %
15 SYRINGE (ML) INTRAVENOUS ONCE
Refills: 0 | Status: DISCONTINUED | OUTPATIENT
Start: 2023-11-13 | End: 2023-11-16

## 2023-11-13 RX ORDER — APIXABAN 2.5 MG/1
5 TABLET, FILM COATED ORAL EVERY 12 HOURS
Refills: 0 | Status: DISCONTINUED | OUTPATIENT
Start: 2023-11-13 | End: 2023-11-16

## 2023-11-13 RX ORDER — INSULIN LISPRO 100/ML
VIAL (ML) SUBCUTANEOUS EVERY 6 HOURS
Refills: 0 | Status: DISCONTINUED | OUTPATIENT
Start: 2023-11-13 | End: 2023-11-13

## 2023-11-13 RX ORDER — FUROSEMIDE 40 MG
40 TABLET ORAL DAILY
Refills: 0 | Status: DISCONTINUED | OUTPATIENT
Start: 2023-11-13 | End: 2023-11-13

## 2023-11-13 RX ORDER — LOSARTAN POTASSIUM 100 MG/1
100 TABLET, FILM COATED ORAL DAILY
Refills: 0 | Status: DISCONTINUED | OUTPATIENT
Start: 2023-11-13 | End: 2023-11-14

## 2023-11-13 RX ORDER — METOPROLOL TARTRATE 50 MG
5 TABLET ORAL ONCE
Refills: 0 | Status: COMPLETED | OUTPATIENT
Start: 2023-11-13 | End: 2023-11-13

## 2023-11-13 RX ORDER — METOPROLOL TARTRATE 50 MG
1 TABLET ORAL
Refills: 0 | DISCHARGE

## 2023-11-13 RX ORDER — SODIUM CHLORIDE 9 MG/ML
1000 INJECTION, SOLUTION INTRAVENOUS
Refills: 0 | Status: DISCONTINUED | OUTPATIENT
Start: 2023-11-13 | End: 2023-11-16

## 2023-11-13 RX ORDER — INSULIN LISPRO 100/ML
VIAL (ML) SUBCUTANEOUS AT BEDTIME
Refills: 0 | Status: DISCONTINUED | OUTPATIENT
Start: 2023-11-13 | End: 2023-11-16

## 2023-11-13 RX ORDER — METOPROLOL TARTRATE 50 MG
50 TABLET ORAL DAILY
Refills: 0 | Status: DISCONTINUED | OUTPATIENT
Start: 2023-11-13 | End: 2023-11-15

## 2023-11-13 RX ORDER — INSULIN GLARGINE 100 [IU]/ML
10 INJECTION, SOLUTION SUBCUTANEOUS ONCE
Refills: 0 | Status: COMPLETED | OUTPATIENT
Start: 2023-11-13 | End: 2023-11-13

## 2023-11-13 RX ORDER — DEXTROSE 50 % IN WATER 50 %
12.5 SYRINGE (ML) INTRAVENOUS ONCE
Refills: 0 | Status: DISCONTINUED | OUTPATIENT
Start: 2023-11-13 | End: 2023-11-16

## 2023-11-13 RX ORDER — ALBUTEROL 90 UG/1
2 AEROSOL, METERED ORAL
Qty: 0 | Refills: 0 | DISCHARGE

## 2023-11-13 RX ORDER — GLUCAGON INJECTION, SOLUTION 0.5 MG/.1ML
1 INJECTION, SOLUTION SUBCUTANEOUS ONCE
Refills: 0 | Status: DISCONTINUED | OUTPATIENT
Start: 2023-11-13 | End: 2023-11-16

## 2023-11-13 RX ADMIN — Medication 3: at 13:25

## 2023-11-13 RX ADMIN — Medication 5 MILLIGRAM(S): at 11:41

## 2023-11-13 RX ADMIN — Medication 10 MILLIGRAM(S): at 22:56

## 2023-11-13 RX ADMIN — AZITHROMYCIN 255 MILLIGRAM(S): 500 TABLET, FILM COATED ORAL at 22:55

## 2023-11-13 RX ADMIN — Medication 3 MILLILITER(S): at 05:00

## 2023-11-13 RX ADMIN — Medication 5 UNIT(S): at 18:01

## 2023-11-13 RX ADMIN — APIXABAN 5 MILLIGRAM(S): 2.5 TABLET, FILM COATED ORAL at 17:33

## 2023-11-13 RX ADMIN — Medication 3 MILLILITER(S): at 23:30

## 2023-11-13 RX ADMIN — MONTELUKAST 10 MILLIGRAM(S): 4 TABLET, CHEWABLE ORAL at 14:25

## 2023-11-13 RX ADMIN — Medication 50 MILLIGRAM(S): at 11:13

## 2023-11-13 RX ADMIN — Medication 3 MILLILITER(S): at 15:58

## 2023-11-13 RX ADMIN — Medication 40 MILLIGRAM(S): at 05:00

## 2023-11-13 RX ADMIN — Medication 3 MILLILITER(S): at 10:17

## 2023-11-13 RX ADMIN — Medication 40 MILLIGRAM(S): at 14:25

## 2023-11-13 RX ADMIN — Medication 3: at 07:16

## 2023-11-13 RX ADMIN — TAMSULOSIN HYDROCHLORIDE 0.4 MILLIGRAM(S): 0.4 CAPSULE ORAL at 22:56

## 2023-11-13 RX ADMIN — INSULIN GLARGINE 15 UNIT(S): 100 INJECTION, SOLUTION SUBCUTANEOUS at 22:57

## 2023-11-13 RX ADMIN — Medication 12: at 17:32

## 2023-11-13 RX ADMIN — BUDESONIDE AND FORMOTEROL FUMARATE DIHYDRATE 2 PUFF(S): 160; 4.5 AEROSOL RESPIRATORY (INHALATION) at 22:55

## 2023-11-13 RX ADMIN — BUDESONIDE AND FORMOTEROL FUMARATE DIHYDRATE 2 PUFF(S): 160; 4.5 AEROSOL RESPIRATORY (INHALATION) at 10:57

## 2023-11-13 RX ADMIN — Medication 40 MILLIGRAM(S): at 22:56

## 2023-11-13 RX ADMIN — APIXABAN 5 MILLIGRAM(S): 2.5 TABLET, FILM COATED ORAL at 05:00

## 2023-11-13 RX ADMIN — Medication 40 MILLIGRAM(S): at 05:04

## 2023-11-13 RX ADMIN — INSULIN GLARGINE 10 UNIT(S): 100 INJECTION, SOLUTION SUBCUTANEOUS at 03:04

## 2023-11-13 NOTE — H&P ADULT - PROBLEM SELECTOR PLAN 3
CXR w/o focal consolidations however appears more vol ol especially at bilateral bases compared to prior cxr    TTE - if new reduced EF  c/w metoprolol ER CXR w/o focal consolidations however appears more vol ol especially at bilateral bases compared to prior cxr    TTE  c/w metoprolol ER  Hold HCTZ/Losartan  IV Lasix qD CXR w/o focal consolidations however appears more vol ol especially at bilateral bases compared to prior cxr    TTE  c/w metoprolol ER 50mg qd  Hold HCTZ/Losartan for now. /73. If uptrending resume losartan  IV Lasix 40mg qD (not on at home)

## 2023-11-13 NOTE — PROGRESS NOTE ADULT - PROBLEM SELECTOR PLAN 7
-resume metoprolol  -hold losartan/HCTZ for now while getting diuresed, resume if BP uptrends. Currently 128/73 -resume metoprolol  -c/w home losartan

## 2023-11-13 NOTE — H&P ADULT - PROBLEM SELECTOR PLAN 6
A1C 8.7%    On BIPAP so will be NPO  ISS q6hr  FSq6hr A1C 8.7%    On BIPAP so will be NPO  Lantus weight base dosing as will likely become hyperglycemic on steroids  ISS q6hr  FSq6hr A1C 8.7%  On discharge previously he required lantus 40 admelog 28 for steroid induced hyperglycemia and just recently had been taken off of insulin    On BIPAP so will be NPO  Lantus 10u to start  ISS q6hr  FSq6hr A1C 8.7%  On discharge previously he required lantus 40 admelog 28 for steroid induced hyperglycemia and just recently had been taken off of insulin (long acting one month ago and short acting 6 months ago due to hypoglycemia)    On BIPAP so will be NPO  Lantus 10u to start - uptitrate as needed  ISS q6hr  FSq6hr

## 2023-11-13 NOTE — PROGRESS NOTE ADULT - SUBJECTIVE AND OBJECTIVE BOX
**************************************  Pati Curiel, PGY1  **************************************    INTERVAL HPI/OVERNIGHT EVENTS: Pt admitted to medicine service overnight   Patient was seen and examined at bedside. As per nurse and patient, no o/n events, patient resting comfortably. No complaints at this time. Patient denies: fever, chills, dizziness, weakness, HA, Changes in vision, CP, palpitations, SOB, cough, N/V/D/C, dysuria, changes in bowel movements, LE edema. ROS otherwise negative.    VITAL SIGNS:  T(F): 98.2 (23 @ 04:57)  HR: 84 (23 @ 07:18)  BP: 113/73 (23 @ 04:57)  RR: 16 (23 @ 04:57)  SpO2: 100% (23 @ 07:18)  Wt(kg): --    PHYSICAL EXAM:    Constitutional: WDWN, NAD  HEENT: PERRL, EOMI, sclera non-icteric, neck supple, trachea midline, no masses, no JVD, MMM, good dentition  Respiratory: CTA b/l, good air entry b/l, no wheezing, no rhonchi, no rales, without accessory muscle use and no intercostal retractions  Cardiovascular: RRR, normal S1S2, no M/R/G  Gastrointestinal: soft, NTND, no masses palpable, BS normal  Extremities: Warm, well perfused, pulses equal bilateral upper and lower extremities, no edema, no clubbing. Capillary refill <2 sec  Neurological: AAOx3, CN Grossly intact  Skin: Normal temperature, warm, dry    MEDICATIONS  (STANDING):  albuterol/ipratropium for Nebulization 3 milliLiter(s) Nebulizer every 6 hours  apixaban 5 milliGRAM(s) Oral every 12 hours  azithromycin  IVPB 500 milliGRAM(s) IV Intermittent every 24 hours  azithromycin  IVPB      budesonide 160 MICROgram(s)/formoterol 4.5 MICROgram(s) Inhaler 2 Puff(s) Inhalation two times a day  dextrose 5%. 1000 milliLiter(s) (50 mL/Hr) IV Continuous <Continuous>  dextrose 5%. 1000 milliLiter(s) (100 mL/Hr) IV Continuous <Continuous>  dextrose 50% Injectable 12.5 Gram(s) IV Push once  dextrose 50% Injectable 25 Gram(s) IV Push once  dextrose 50% Injectable 25 Gram(s) IV Push once  furosemide   Injectable 40 milliGRAM(s) IV Push daily  glucagon  Injectable 1 milliGRAM(s) IntraMuscular once  insulin glargine Injectable (LANTUS) 10 Unit(s) SubCutaneous at bedtime  insulin lispro (ADMELOG) corrective regimen sliding scale   SubCutaneous every 6 hours  methylPREDNISolone sodium succinate Injectable 40 milliGRAM(s) IV Push every 8 hours  metoclopramide 10 milliGRAM(s) Oral at bedtime  montelukast 10 milliGRAM(s) Oral daily  tamsulosin 0.4 milliGRAM(s) Oral at bedtime    MEDICATIONS  (PRN):  dextrose Oral Gel 15 Gram(s) Oral once PRN Blood Glucose LESS THAN 70 milliGRAM(s)/deciliter      Allergies    No Known Allergies    Intolerances        LABS:                        12.8   13.18 )-----------( 328      ( 2023 16:20 )             38.7     12    137  |  100  |  16  ----------------------------<  186<H>  4.2   |  25  |  1.24    Ca    9.2      2023 16:20    TPro  8.0  /  Alb  3.9  /  TBili  0.5  /  DBili  x   /  AST  12  /  ALT  12  /  AlkPhos  125<H>  11-12      Urinalysis Basic - ( 2023 21:10 )    Color: Yellow / Appearance: Clear / S.008 / pH: x  Gluc: x / Ketone: Negative mg/dL  / Bili: Negative / Urobili: 0.2 mg/dL   Blood: x / Protein: Negative mg/dL / Nitrite: Negative   Leuk Esterase: Negative / RBC: 0 /HPF / WBC 0 /HPF   Sq Epi: x / Non Sq Epi: 0 /HPF / Bacteria: Negative /HPF        RADIOLOGY & ADDITIONAL TESTS:  Reviewed **************************************  Pati Curiel, PGY1  **************************************    INTERVAL HPI/OVERNIGHT EVENTS: Pt admitted to medicine service overnight   Patient was seen and examined at bedside. As per nurse and patient, no o/n events, patient resting comfortably. No complaints at this time. Patient denies: fever, chills, dizziness, weakness, HA, Changes in vision, CP, palpitations, SOB, cough, N/V/D/C, dysuria, changes in bowel movements, LE edema. ROS otherwise negative.    VITAL SIGNS:  T(F): 98.2 (23 @ 04:57)  HR: 84 (23 @ 07:18)  BP: 113/73 (23 @ 04:57)  RR: 16 (23 @ 04:57)  SpO2: 100% (23 @ 07:18)  Wt(kg): --    PHYSICAL EXAM:    General: WN/WD NAD, on nasal canula   Neurology: A&Ox3, nonfocal, AMARAL x 4  Eyes: PERRLA/ EOMI, Gross vision intact  ENT/Neck: Neck supple, trachea midline, No JVD, Gross hearing intact, No HJR  Respiratory: Wheezing and crackles bilaterally, no accessory muscle use  CV: irregular rhythm, +S1/S2, -S3/S4, no murmurs, rubs or gallops, no LE edema  Abdominal: Soft, NT, ND +BS, No HSM  MSK: 5/5 strength UE/LE bilaterally  Extremities: No edema, 2+ peripheral pulses  Skin: No Rashes, Hematoma, Ecchymosi    MEDICATIONS  (STANDING):  albuterol/ipratropium for Nebulization 3 milliLiter(s) Nebulizer every 6 hours  apixaban 5 milliGRAM(s) Oral every 12 hours  azithromycin  IVPB 500 milliGRAM(s) IV Intermittent every 24 hours  azithromycin  IVPB      budesonide 160 MICROgram(s)/formoterol 4.5 MICROgram(s) Inhaler 2 Puff(s) Inhalation two times a day  dextrose 5%. 1000 milliLiter(s) (50 mL/Hr) IV Continuous <Continuous>  dextrose 5%. 1000 milliLiter(s) (100 mL/Hr) IV Continuous <Continuous>  dextrose 50% Injectable 12.5 Gram(s) IV Push once  dextrose 50% Injectable 25 Gram(s) IV Push once  dextrose 50% Injectable 25 Gram(s) IV Push once  furosemide   Injectable 40 milliGRAM(s) IV Push daily  glucagon  Injectable 1 milliGRAM(s) IntraMuscular once  insulin glargine Injectable (LANTUS) 10 Unit(s) SubCutaneous at bedtime  insulin lispro (ADMELOG) corrective regimen sliding scale   SubCutaneous every 6 hours  methylPREDNISolone sodium succinate Injectable 40 milliGRAM(s) IV Push every 8 hours  metoclopramide 10 milliGRAM(s) Oral at bedtime  montelukast 10 milliGRAM(s) Oral daily  tamsulosin 0.4 milliGRAM(s) Oral at bedtime    MEDICATIONS  (PRN):  dextrose Oral Gel 15 Gram(s) Oral once PRN Blood Glucose LESS THAN 70 milliGRAM(s)/deciliter      Allergies    No Known Allergies    Intolerances        LABS:                        12.8   13.18 )-----------( 328      ( 2023 16:20 )             38.7     -12    137  |  100  |  16  ----------------------------<  186<H>  4.2   |  25  |  1.24    Ca    9.2      2023 16:20    TPro  8.0  /  Alb  3.9  /  TBili  0.5  /  DBili  x   /  AST  12  /  ALT  12  /  AlkPhos  125<H>  11-12      Urinalysis Basic - ( 2023 21:10 )    Color: Yellow / Appearance: Clear / S.008 / pH: x  Gluc: x / Ketone: Negative mg/dL  / Bili: Negative / Urobili: 0.2 mg/dL   Blood: x / Protein: Negative mg/dL / Nitrite: Negative   Leuk Esterase: Negative / RBC: 0 /HPF / WBC 0 /HPF   Sq Epi: x / Non Sq Epi: 0 /HPF / Bacteria: Negative /HPF        RADIOLOGY & ADDITIONAL TESTS:  Reviewed

## 2023-11-13 NOTE — H&P ADULT - TIME BILLING
I had a face to face encounter with this patient. I spent 80 total minutes on the bedside interview and examination, coordination of care, counseling, chart review, order placement and documentation for this patient.    This time spent by me managing the patient is independent of the time spent by the resident

## 2023-11-13 NOTE — H&P ADULT - NSHPLABSRESULTS_GEN_ALL_CORE
12.8   13.18 )-----------( 328      ( 12 Nov 2023 16:20 )                 38.7   11-12    137  |  100  |  16  ----------------------------<  186<H>  4.2   |  25  |  1.24    Ca    9.2      12 Nov 2023 16:20    TPro  8.0  /  Alb  3.9  /  TBili  0.5  /  DBili  x   /  AST  12  /  ALT  12  /  AlkPhos  125<H>  11-12 12.8   13.18 )-----------( 328      ( 12 Nov 2023 16:20 )                 38.7   11-12    137  |  100  |  16  ----------------------------<  186<H>  4.2   |  25  |  1.24    Ca    9.2      12 Nov 2023 16:20    TPro  8.0  /  Alb  3.9  /  TBili  0.5  /  DBili  x   /  AST  12  /  ALT  12  /  AlkPhos  125<H>  11-12    EKG: Afib - rate in the 90s, QTc of 454    CARDIAC MARKERS ( 12 Nov 2023 17:44 )  x     / x     / 58 U/L / x     / 3.5 ng/mL    Blood Gas Profile - Venous (11.12.23 @ 20:56)   pH, Venous: 7.29: No collection time indicated, please interpret with caution  pCO2, Venous: 57 mmHg  pO2, Venous: 51 mmHg  HCO3, Venous: 27 mmol/L  Base Excess, Venous: -0.2 mmol/L  Oxygen Saturation, Venous: 72.2 %  Total CO2, Venous: 29.1 mmol/L Labs reviewed by me:      12.8   13.18 )-----------( 328      ( 12 Nov 2023 16:20 )                 38.7   11-12    137  |  100  |  16  ----------------------------<  186<H>  4.2   |  25  |  1.24    Ca    9.2      12 Nov 2023 16:20    TPro  8.0  /  Alb  3.9  /  TBili  0.5  /  DBili  x   /  AST  12  /  ALT  12  /  AlkPhos  125<H>  11-12    EKG: Afib - rate in the 90s, QTc of 454, no ST changes    CARDIAC MARKERS ( 12 Nov 2023 17:44 )  x     / x     / 58 U/L / x     / 3.5 ng/mL    Blood Gas Profile - Venous (11.12.23 @ 20:56)   pH, Venous: 7.29: No collection time indicated, please interpret with caution  pCO2, Venous: 57 mmHg  pO2, Venous: 51 mmHg  HCO3, Venous: 27 mmol/L  Base Excess, Venous: -0.2 mmol/L  Oxygen Saturation, Venous: 72.2 %  Total CO2, Venous: 29.1 mmol/L    CXR: prelim read:   No focal consolidation.

## 2023-11-13 NOTE — H&P ADULT - PROBLEM SELECTOR PLAN 9
Eliquis not listed as part of home medications    Eliquis 5mg BID  Denies prior CHF so CHADSVASC of 3  Given suspicion high for CHF --> CHADSVASC 4 Eliquis 5mg BID  Denies prior CHF so CHADSVASC of 3  Given suspicion high for CHF --> CHADSVASC 4

## 2023-11-13 NOTE — H&P ADULT - HISTORY OF PRESENT ILLNESS
68M Czech-speaking w/ COPD/Asthma overlap current smoker with >20pack years, T2DM, gastroparesis, HTN, CAD s/p 1 stent, Afib presenting with coughx4 days and SOB x2days. Patient is A/Ox4 though defers to son for history. Patient started to have a dry, non-productive cough, 4 days ago. It felt like a typical worsening of his asthma and he had to use his rescue inhaler ~4 times. Despite this he started to develop a SOB with exertion. He is usually able to walk 1-2 flights of stairs without becoming winded however now he cannot walk up a flight of stairs. Due to this, the son called for EMS to evaluate the patient. While on the ride with EMS he started to become even more short of breath requiring a mask (CPAP) to support his breathing further. Patient himself states he feels like this is asthma and continues to endorse SOB. Denies CP past or present, denies abdominal pain/dysuria. Denies recent travel and sick contacts. States that it had significantly worsened today. Denies fever, chills, sore throat, congestion, N/V, abdominal pain, edema, PND/orthopnea. He reports no nocturnal awakenings either from asthma or any other cause.      In ED, afebrile, tachycardic, hypertensive, required BIPAP for increased WOB. Received 2 nebs, 2g mag, 12 of decadron by EMS. In ED recived tylenol, lasix 40, azithromycin, solu-medrol 40, Zosyn. ED noted B-lines on lung POCUS.     Pulm: [facility] [place]   Cards: [facility]   68M Wolof-speaking w/ COPD/Asthma overlap current smoker with >20pack years, T2DM, gastroparesis, HTN, CAD s/p 1 stent, Afib presenting with coughx4 days and SOB x2days. Patient is A/Ox4 though defers to son for history. Patient started to have a dry, non-productive cough, 4 days ago. It felt like a typical worsening of his asthma and he had to use his rescue inhaler ~4 times. Despite this he started to develop a SOB with exertion. He is usually able to walk 1-2 flights of stairs without becoming winded however now he cannot walk up a flight of stairs. Due to this, the son called for EMS to evaluate the patient. While on the ride with EMS he started to become even more short of breath requiring a mask (CPAP) to support his breathing further. Patient himself states he feels like this is asthma and continues to endorse SOB. Denies CP past or present, denies abdominal pain/dysuria. Denies recent travel and sick contacts. States that it had significantly worsened today. Denies fever, chills, sore throat, congestion, N/V, abdominal pain, edema, PND/orthopnea. He reports no nocturnal awakenings either from asthma or any other cause. Patient is on HCTZ 25mg qd but no longer on lasix. Also no longer on insulin - long acting stopped one month ago and short acting stopped 6 months ago and has had low FSGs to 50-70s in the evening previously.     In ED, afebrile, tachycardic, hypertensive, required BIPAP for increased WOB. Received 2 nebs, 2g mag, 12 of decadron by EMS. In ED received tylenol, lasix 40, azithromycin, solu-medrol 40, Zosyn. ED noted B-lines on lung POCUS.     Pulm: [facility] [place]   Cards: [facility]

## 2023-11-13 NOTE — H&P ADULT - PROBLEM SELECTOR PLAN 1
With respiratory acidosis 2.2 CO2 retention  Correlates to COPD exacerbation    c/w BIPAP for ventilation effects - trend VBGs  Pulm consult in AM  C/w prednisone  c/w azithromycin  c/w symbicort + Duonebs + singulair  Duonebs q6hr With respiratory acidosis 2.2 CO2 retention  Correlates to COPD exacerbation    c/w BIPAP for ventilation effects - trend VBGs  Pulm consult in AM  C/w methyl-prednisonolone  c/w azithromycin  c/w symbicort + Duonebs + singulair  Duonebs q6hr With respiratory acidosis 2.2 CO2 retention  Correlates to COPD exacerbation    c/w BIPAP for ventilation effects - trend VBGs, check in AM  Pulm consult in AM  C/w methyl-prednisonolone 40mg IV q8hrs, taper per pulm  c/w azithromycin  c/w symbicort + Duonebs + singulair  Duonebs q6hr

## 2023-11-13 NOTE — PROGRESS NOTE ADULT - PROBLEM SELECTOR PLAN 6
A1C 8.7%  On discharge previously he required lantus 40 admelog 28 for steroid induced hyperglycemia and just recently had been taken off of insulin (long acting one month ago and short acting 6 months ago due to hypoglycemia)    On BIPAP so will be NPO  Lantus 10u to start - uptitrate as needed  ISS q6hr  FSq6hr

## 2023-11-13 NOTE — H&P ADULT - ATTENDING COMMENTS
I have personally seen, examined, and participated in the care of this patient.  I have reviewed all pertinent clinical information, including history, physical exam, plan, and the resident's note and agree except as noted.    #Acute hypercarbic respiratory failure  -c/w BIPAP  -COPD exacerbation management - standing duonebs, methylpred 40mg IV q8hrs - taper per pulm, azithromycin  -pulm c/s in AM  -VBG in AM    #Acute CHF exacerbation  Probnp elevated 3313, but no LE edema.   -CXR no focal consolidation  -TTE  -lasix 40mg IV qd  -keep K>4 and Mg >2  -daily weights, strict I's and O's  -c/w metoprolol    #hx Afib  C/w eliquis, metoprolol    #DM2  Lantus 10u qhs for now, ISS, NPO. Likely will require higher doses considering prior steroid induced hyperglycemia. Uptitrated as needed. may require mod ISS    #HTN  Hold losartan/HCTZ for now and resume if BP uptrending  Resume metoprolol  Lasix as above    #CAD  Resume ASA, statin, BB    #Abnormal UA  repeat to confirm RBCs, if positive  f/u outpatient    #Lactic acidosis  Likely 2/2 albuterol

## 2023-11-13 NOTE — PROGRESS NOTE ADULT - PROBLEM SELECTOR PLAN 3
CXR w/o focal consolidations however appears more vol ol especially at bilateral bases compared to prior cxr    TTE  c/w metoprolol ER 50mg qd  Hold HCTZ/Losartan for now. /73. If uptrending resume losartan  IV Lasix 40mg qD (not on at home) CXR w/o focal consolidations however appears more vol ol especially at bilateral bases compared to prior cxr    TTE  c/w metoprolol ER 50mg qd  c/w home Losartan  IV Lasix 40mg qD (not on at home) prn

## 2023-11-13 NOTE — PROGRESS NOTE ADULT - PROBLEM SELECTOR PLAN 12
Activity: Bed rest on BIPAP  Bowel reg: miralax   Consultants: PULM   Diet: NPO on BIPAP  DVT ppx: AC  Dispo: pending clinical improvement  Directive: Full code

## 2023-11-13 NOTE — H&P ADULT - PROBLEM SELECTOR PLAN 8
Asa 81 and Eliquis not part of home medications    Patient should be on both  c/w High intensity statin Asa 81 not part of home medications    Patient should be aspirin 81 and eliquis  c/w High intensity statin Asa 81 not part of home medications    Patient should be aspirin 81 and eliquis  c/w High intensity statin  Patient should be on ASA per last cardiology note in January, patient to have f/u with cards

## 2023-11-13 NOTE — H&P ADULT - PROBLEM SELECTOR PLAN 12
Activity: Bed rest on BIPAP  Bowel reg: miralax   Consultants: PULM (Please consult in AM)  Diet: NPO on BIPAP  DVT ppx: AC  Dispo: pending clinical improvement  Directive: Full code  Electrolytes: (goal Mg, Phos, K: 2, 3, 4)  Family: Son Cindy   Gtts:  Hardware: Activity: Bed rest on BIPAP  Bowel reg: miralax   Consultants: PULM (Please consult in AM)  Diet: NPO on BIPAP  DVT ppx: AC  Dispo: pending clinical improvement  Directive: Full code

## 2023-11-13 NOTE — H&P ADULT - NSHPREVIEWOFSYSTEMS_GEN_ALL_CORE
REVIEW OF SYSTEMS:  Constitutional: [ ] fevers [ ] chills [ ] weight loss [ ] weight gain  HEENT: [ ] vision problems [ ]  eye pain [ ]  nasal congestion [ ] rhinorrhea [ ] sore throat [ ] dysphagia  CV: [ ] chest pain [ ] orthopnea [ ] palpitations   Resp: [ ] cough, [ ] dyspnea, [ ] wheezing, [ ] hemoptysis  GI: [ ] nausea, [ ] vomiting, [ ] diarrhea, [ ] constipation, [ ] abdominal pain  : [ ] dysuria [ ] nocturia [ ] hematuria [ ] increased urinary frequency  Musculoskeletal: [ ] back pain [ ] myalgias [ ] arthralgias [ ] fracture  Skin: [ ] rash [ ] itch  Neurological: [ ] headache [ ] dizziness [ ] syncope [ ] weakness [ ] numbness  Psychiatric: [ ] anxiety [ ] depression  Endocrine: [ ] diabetes [ ] thyroid problem  Hematologic/Lymphatic: [ ] anemia [ ] bleeding problem  Allergic/Immunologic: [ ] itchy eyes [ ] nasal discharge [ ] hives [ ] angioedema  [ ] All other systems negative  [ ] Unable to assess ROS because ________ REVIEW OF SYSTEMS:  Constitutional: [ ] fevers [ ] chills [ ] weight loss [ ] weight gain  HEENT: [ ] vision problems [ ]  eye pain [ ]  nasal congestion [ ] rhinorrhea [ ] sore throat [ ] dysphagia  CV: [ ] chest pain [ ] orthopnea [ ] palpitations   Resp: [X ] cough, [X] dyspnea, [X ] wheezing, [ ] hemoptysis  GI: [ ] nausea, [ ] vomiting, [ ] diarrhea, [ ] constipation, [ ] abdominal pain  : [ ] dysuria [ ] nocturia [ ] hematuria [ ] increased urinary frequency  Musculoskeletal: [ ] back pain [ ] myalgias [ ] arthralgias [ ] fracture  Skin: [ ] rash [ ] itch  Neurological: [ ] headache [ ] dizziness [ ] syncope [ ] weakness [ ] numbness  Psychiatric: [ ] anxiety [ ] depression  Endocrine: [ ] diabetes [ ] thyroid problem  Hematologic/Lymphatic: [ ] anemia [ ] bleeding problem  Allergic/Immunologic: [ ] itchy eyes [ ] nasal discharge [ ] hives [ ] angioedema  [ ] All other systems negative  [ ] Unable to assess ROS because ________ REVIEW OF SYSTEMS:  Constitutional: [ ] fevers [ ] chills [ ] weight loss [ ] weight gain  HEENT: [ ] vision problems [ ]  eye pain [ ]  nasal congestion [ ] rhinorrhea [ ] sore throat [ ] dysphagia  CV: [ ] chest pain [ ] orthopnea [ ] palpitations   Resp: [X ] cough, [X] dyspnea, [X ] wheezing, [ ] hemoptysis  GI: [ ] nausea, [ ] vomiting, [ ] diarrhea, [ ] constipation, [ ] abdominal pain  : [ ] dysuria [ ] nocturia [ ] hematuria [ ] increased urinary frequency  Musculoskeletal: [ ] back pain [ ] myalgias [ ] arthralgias [ ] fracture  Skin: [ ] rash [ ] itch  Neurological: [ ] headache [ ] dizziness [ ] syncope [ ] weakness [ ] numbness  Psychiatric: [ ] anxiety [ ] depression  Endocrine: [x ] diabetes [ ] thyroid problem  Hematologic/Lymphatic: [ ] anemia [ ] bleeding problem  Allergic/Immunologic: [ ] itchy eyes [ ] nasal discharge [ ] hives [ ] angioedema  [x ] All other systems negative

## 2023-11-13 NOTE — PROGRESS NOTE ADULT - ASSESSMENT
68M Urdu-speaking w/ COPD/Asthma overlap current smoker with >20pack years, T2DM, HTN, CAD s/p 1 stent, Afib presenting with coughx4 days and SOB x2days concerning for asthma exacerbation vs new CHF.

## 2023-11-13 NOTE — H&P ADULT - ASSESSMENT
68M English-speaking w/ COPD/Asthma overlap current smoker with >20pack years, T2DM, HTN, CAD s/p 1 stent, Afib presenting with coughx4 days and SOB x2days concerning for asthma exacerbation vs new CHF

## 2023-11-13 NOTE — H&P ADULT - PROBLEM SELECTOR PLAN 7
Losartan and metoprolol home medications -resume metoprolol  -hold losartan/HCTZ for now while getting diuresed, resume if BP uptrends. Currently 128/73

## 2023-11-13 NOTE — H&P ADULT - PROBLEM SELECTOR PLAN 4
Unclear cause of hematuria, given patient is a smoker he is at risk of bladder cancer    Repeat UA to confirm blood as initial was without RBCs  check CPK Unclear cause of hematuria, given patient is a smoker he is at risk of bladder cancer    Repeat UA to confirm blood as initial was without RBCs  CPK Negative

## 2023-11-13 NOTE — H&P ADULT - NSHPPHYSICALEXAM_GEN_ALL_CORE
Vital Signs Last 24 Hrs  T(C): 36.3 (12 Nov 2023 21:15), Max: 36.4 (12 Nov 2023 16:44)  T(F): 97.4 (12 Nov 2023 21:15), Max: 97.5 (12 Nov 2023 16:44)  HR: 90 (12 Nov 2023 21:45) (89 - 118)  BP: 128/73 (12 Nov 2023 21:45) (128/73 - 151/105)  BP(mean): --  RR: 20 (12 Nov 2023 21:45) (20 - 26)  SpO2: 100% (12 Nov 2023 21:45) (98% - 100%)    Parameters below as of 12 Nov 2023 21:45  Patient On (Oxygen Delivery Method): BiPAP/CPAP        General: WN/WD NAD, on BIPAP  Neurology: A&Ox3, nonfocal, AMARAL x 4  Eyes: PERRLA/ EOMI, Gross vision intact  ENT/Neck: Neck supple, trachea midline, No JVD, Gross hearing intact, No HJR  Respiratory: Wheezing and crackles bilaterally   CV: irregular rhythm, +S1/S2, -S3/S4, no murmurs, rubs or gallops  Abdominal: Soft, NT, ND +BS, No HSM  MSK: 5/5 strength UE/LE bilaterally  Extremities: No edema, 2+ peripheral pulses  Skin: No Rashes, Hematoma, Ecchymosis  Incisions:   Tubes: Vital Signs Last 24 Hrs  T(C): 36.3 (12 Nov 2023 21:15), Max: 36.4 (12 Nov 2023 16:44)  T(F): 97.4 (12 Nov 2023 21:15), Max: 97.5 (12 Nov 2023 16:44)  HR: 90 (12 Nov 2023 21:45) (89 - 118)  BP: 128/73 (12 Nov 2023 21:45) (128/73 - 151/105)  BP(mean): --  RR: 20 (12 Nov 2023 21:45) (20 - 26)  SpO2: 100% (12 Nov 2023 21:45) (98% - 100%)    Parameters below as of 12 Nov 2023 21:45  Patient On (Oxygen Delivery Method): BiPAP/CPAP        General: WN/WD NAD, on BIPAP  Neurology: A&Ox3, nonfocal, AMARAL x 4  Eyes: PERRLA/ EOMI, Gross vision intact  ENT/Neck: Neck supple, trachea midline, No JVD, Gross hearing intact, No HJR  Respiratory: Wheezing and crackles bilaterally, no accessory muscle use  CV: irregular rhythm, +S1/S2, -S3/S4, no murmurs, rubs or gallops, no LE edema  Abdominal: Soft, NT, ND +BS, No HSM  MSK: 5/5 strength UE/LE bilaterally  Extremities: No edema, 2+ peripheral pulses  Skin: No Rashes, Hematoma, Ecchymosis

## 2023-11-13 NOTE — PROGRESS NOTE ADULT - PROBLEM SELECTOR PLAN 1
With respiratory acidosis 2.2 CO2 retention  Correlates to COPD exacerbation    c/w BIPAP for ventilation effects - trend VBGs, check in AM  Pulm consult in AM  C/w methyl-prednisonolone 40mg IV q8hrs, taper per pulm  c/w azithromycin  c/w symbicort + Duonebs + singulair  Duonebs q6hr With respiratory acidosis 2.2 CO2 retention  Correlates to COPD exacerbation    transition to nasal canula- trend VBGs, check in AM  Pulm notified patient is here- agree with course of action and can formally consult if needed   C/w methyl-prednisonolone 40mg IV q8hrs, taper per pulm  c/w azithromycin  c/w symbicort + Duonebs + singulair  Duonebs q6hr

## 2023-11-14 ENCOUNTER — TRANSCRIPTION ENCOUNTER (OUTPATIENT)
Age: 68
End: 2023-11-14

## 2023-11-14 LAB
ALBUMIN SERPL ELPH-MCNC: 3.5 G/DL — SIGNIFICANT CHANGE UP (ref 3.3–5)
ALBUMIN SERPL ELPH-MCNC: 3.5 G/DL — SIGNIFICANT CHANGE UP (ref 3.3–5)
ALP SERPL-CCNC: 99 U/L — SIGNIFICANT CHANGE UP (ref 40–120)
ALP SERPL-CCNC: 99 U/L — SIGNIFICANT CHANGE UP (ref 40–120)
ALT FLD-CCNC: 10 U/L — SIGNIFICANT CHANGE UP (ref 4–41)
ALT FLD-CCNC: 10 U/L — SIGNIFICANT CHANGE UP (ref 4–41)
ANION GAP SERPL CALC-SCNC: 14 MMOL/L — SIGNIFICANT CHANGE UP (ref 7–14)
ANION GAP SERPL CALC-SCNC: 14 MMOL/L — SIGNIFICANT CHANGE UP (ref 7–14)
AST SERPL-CCNC: 14 U/L — SIGNIFICANT CHANGE UP (ref 4–40)
AST SERPL-CCNC: 14 U/L — SIGNIFICANT CHANGE UP (ref 4–40)
BASE EXCESS BLDV CALC-SCNC: 2.9 MMOL/L — SIGNIFICANT CHANGE UP (ref -2–3)
BASE EXCESS BLDV CALC-SCNC: 2.9 MMOL/L — SIGNIFICANT CHANGE UP (ref -2–3)
BASOPHILS # BLD AUTO: 0 K/UL — SIGNIFICANT CHANGE UP (ref 0–0.2)
BASOPHILS # BLD AUTO: 0 K/UL — SIGNIFICANT CHANGE UP (ref 0–0.2)
BASOPHILS NFR BLD AUTO: 0 % — SIGNIFICANT CHANGE UP (ref 0–2)
BASOPHILS NFR BLD AUTO: 0 % — SIGNIFICANT CHANGE UP (ref 0–2)
BILIRUB SERPL-MCNC: 0.3 MG/DL — SIGNIFICANT CHANGE UP (ref 0.2–1.2)
BILIRUB SERPL-MCNC: 0.3 MG/DL — SIGNIFICANT CHANGE UP (ref 0.2–1.2)
BLOOD GAS VENOUS COMPREHENSIVE RESULT: SIGNIFICANT CHANGE UP
BLOOD GAS VENOUS COMPREHENSIVE RESULT: SIGNIFICANT CHANGE UP
BUN SERPL-MCNC: 48 MG/DL — HIGH (ref 7–23)
BUN SERPL-MCNC: 48 MG/DL — HIGH (ref 7–23)
CALCIUM SERPL-MCNC: 9 MG/DL — SIGNIFICANT CHANGE UP (ref 8.4–10.5)
CALCIUM SERPL-MCNC: 9 MG/DL — SIGNIFICANT CHANGE UP (ref 8.4–10.5)
CHLORIDE BLDV-SCNC: 100 MMOL/L — SIGNIFICANT CHANGE UP (ref 96–108)
CHLORIDE BLDV-SCNC: 100 MMOL/L — SIGNIFICANT CHANGE UP (ref 96–108)
CHLORIDE SERPL-SCNC: 99 MMOL/L — SIGNIFICANT CHANGE UP (ref 98–107)
CHLORIDE SERPL-SCNC: 99 MMOL/L — SIGNIFICANT CHANGE UP (ref 98–107)
CO2 BLDV-SCNC: 26.7 MMOL/L — HIGH (ref 22–26)
CO2 BLDV-SCNC: 26.7 MMOL/L — HIGH (ref 22–26)
CO2 SERPL-SCNC: 24 MMOL/L — SIGNIFICANT CHANGE UP (ref 22–31)
CO2 SERPL-SCNC: 24 MMOL/L — SIGNIFICANT CHANGE UP (ref 22–31)
CREAT SERPL-MCNC: 1.4 MG/DL — HIGH (ref 0.5–1.3)
CREAT SERPL-MCNC: 1.4 MG/DL — HIGH (ref 0.5–1.3)
EGFR: 54 ML/MIN/1.73M2 — LOW
EGFR: 54 ML/MIN/1.73M2 — LOW
EOSINOPHIL # BLD AUTO: 0 K/UL — SIGNIFICANT CHANGE UP (ref 0–0.5)
EOSINOPHIL # BLD AUTO: 0 K/UL — SIGNIFICANT CHANGE UP (ref 0–0.5)
EOSINOPHIL NFR BLD AUTO: 0 % — SIGNIFICANT CHANGE UP (ref 0–6)
EOSINOPHIL NFR BLD AUTO: 0 % — SIGNIFICANT CHANGE UP (ref 0–6)
GAS PNL BLDV: 132 MMOL/L — LOW (ref 136–145)
GAS PNL BLDV: 132 MMOL/L — LOW (ref 136–145)
GAS PNL BLDV: SIGNIFICANT CHANGE UP
GAS PNL BLDV: SIGNIFICANT CHANGE UP
GLUCOSE BLDC GLUCOMTR-MCNC: 243 MG/DL — HIGH (ref 70–99)
GLUCOSE BLDC GLUCOMTR-MCNC: 243 MG/DL — HIGH (ref 70–99)
GLUCOSE BLDC GLUCOMTR-MCNC: 269 MG/DL — HIGH (ref 70–99)
GLUCOSE BLDC GLUCOMTR-MCNC: 269 MG/DL — HIGH (ref 70–99)
GLUCOSE BLDC GLUCOMTR-MCNC: 325 MG/DL — HIGH (ref 70–99)
GLUCOSE BLDC GLUCOMTR-MCNC: 325 MG/DL — HIGH (ref 70–99)
GLUCOSE BLDC GLUCOMTR-MCNC: 433 MG/DL — HIGH (ref 70–99)
GLUCOSE BLDC GLUCOMTR-MCNC: 433 MG/DL — HIGH (ref 70–99)
GLUCOSE BLDC GLUCOMTR-MCNC: 445 MG/DL — HIGH (ref 70–99)
GLUCOSE BLDC GLUCOMTR-MCNC: 445 MG/DL — HIGH (ref 70–99)
GLUCOSE BLDV-MCNC: 327 MG/DL — HIGH (ref 70–99)
GLUCOSE BLDV-MCNC: 327 MG/DL — HIGH (ref 70–99)
GLUCOSE SERPL-MCNC: 312 MG/DL — HIGH (ref 70–99)
GLUCOSE SERPL-MCNC: 312 MG/DL — HIGH (ref 70–99)
HCO3 BLDV-SCNC: 26 MMOL/L — SIGNIFICANT CHANGE UP (ref 22–29)
HCO3 BLDV-SCNC: 26 MMOL/L — SIGNIFICANT CHANGE UP (ref 22–29)
HCT VFR BLD CALC: 34.2 % — LOW (ref 39–50)
HCT VFR BLD CALC: 34.2 % — LOW (ref 39–50)
HCT VFR BLDA CALC: 40 % — SIGNIFICANT CHANGE UP (ref 39–51)
HCT VFR BLDA CALC: 40 % — SIGNIFICANT CHANGE UP (ref 39–51)
HGB BLD CALC-MCNC: 13.4 G/DL — SIGNIFICANT CHANGE UP (ref 12.6–17.4)
HGB BLD CALC-MCNC: 13.4 G/DL — SIGNIFICANT CHANGE UP (ref 12.6–17.4)
HGB BLD-MCNC: 11.9 G/DL — LOW (ref 13–17)
HGB BLD-MCNC: 11.9 G/DL — LOW (ref 13–17)
IANC: 15.26 K/UL — HIGH (ref 1.8–7.4)
IANC: 15.26 K/UL — HIGH (ref 1.8–7.4)
LACTATE BLDV-MCNC: 2.8 MMOL/L — HIGH (ref 0.5–2)
LACTATE BLDV-MCNC: 2.8 MMOL/L — HIGH (ref 0.5–2)
LYMPHOCYTES # BLD AUTO: 12.1 % — LOW (ref 13–44)
LYMPHOCYTES # BLD AUTO: 12.1 % — LOW (ref 13–44)
LYMPHOCYTES # BLD AUTO: 2.05 K/UL — SIGNIFICANT CHANGE UP (ref 1–3.3)
LYMPHOCYTES # BLD AUTO: 2.05 K/UL — SIGNIFICANT CHANGE UP (ref 1–3.3)
MAGNESIUM SERPL-MCNC: 2.4 MG/DL — SIGNIFICANT CHANGE UP (ref 1.6–2.6)
MAGNESIUM SERPL-MCNC: 2.4 MG/DL — SIGNIFICANT CHANGE UP (ref 1.6–2.6)
MCHC RBC-ENTMCNC: 28.6 PG — SIGNIFICANT CHANGE UP (ref 27–34)
MCHC RBC-ENTMCNC: 28.6 PG — SIGNIFICANT CHANGE UP (ref 27–34)
MCHC RBC-ENTMCNC: 34.8 GM/DL — SIGNIFICANT CHANGE UP (ref 32–36)
MCHC RBC-ENTMCNC: 34.8 GM/DL — SIGNIFICANT CHANGE UP (ref 32–36)
MCV RBC AUTO: 82.2 FL — SIGNIFICANT CHANGE UP (ref 80–100)
MCV RBC AUTO: 82.2 FL — SIGNIFICANT CHANGE UP (ref 80–100)
MONOCYTES # BLD AUTO: 0.14 K/UL — SIGNIFICANT CHANGE UP (ref 0–0.9)
MONOCYTES # BLD AUTO: 0.14 K/UL — SIGNIFICANT CHANGE UP (ref 0–0.9)
MONOCYTES NFR BLD AUTO: 0.8 % — LOW (ref 2–14)
MONOCYTES NFR BLD AUTO: 0.8 % — LOW (ref 2–14)
NEUTROPHILS # BLD AUTO: 14.73 K/UL — HIGH (ref 1.8–7.4)
NEUTROPHILS # BLD AUTO: 14.73 K/UL — HIGH (ref 1.8–7.4)
NEUTROPHILS NFR BLD AUTO: 80.2 % — HIGH (ref 43–77)
NEUTROPHILS NFR BLD AUTO: 80.2 % — HIGH (ref 43–77)
PCO2 BLDV: 33 MMHG — LOW (ref 42–55)
PCO2 BLDV: 33 MMHG — LOW (ref 42–55)
PH BLDV: 7.5 — HIGH (ref 7.32–7.43)
PH BLDV: 7.5 — HIGH (ref 7.32–7.43)
PHOSPHATE SERPL-MCNC: 4.8 MG/DL — HIGH (ref 2.5–4.5)
PHOSPHATE SERPL-MCNC: 4.8 MG/DL — HIGH (ref 2.5–4.5)
PLATELET # BLD AUTO: 276 K/UL — SIGNIFICANT CHANGE UP (ref 150–400)
PLATELET # BLD AUTO: 276 K/UL — SIGNIFICANT CHANGE UP (ref 150–400)
PO2 BLDV: 80 MMHG — HIGH (ref 25–45)
PO2 BLDV: 80 MMHG — HIGH (ref 25–45)
POTASSIUM BLDV-SCNC: 3.8 MMOL/L — SIGNIFICANT CHANGE UP (ref 3.5–5.1)
POTASSIUM BLDV-SCNC: 3.8 MMOL/L — SIGNIFICANT CHANGE UP (ref 3.5–5.1)
POTASSIUM SERPL-MCNC: 3.8 MMOL/L — SIGNIFICANT CHANGE UP (ref 3.5–5.3)
POTASSIUM SERPL-MCNC: 3.8 MMOL/L — SIGNIFICANT CHANGE UP (ref 3.5–5.3)
POTASSIUM SERPL-SCNC: 3.8 MMOL/L — SIGNIFICANT CHANGE UP (ref 3.5–5.3)
POTASSIUM SERPL-SCNC: 3.8 MMOL/L — SIGNIFICANT CHANGE UP (ref 3.5–5.3)
PROT SERPL-MCNC: 7.4 G/DL — SIGNIFICANT CHANGE UP (ref 6–8.3)
PROT SERPL-MCNC: 7.4 G/DL — SIGNIFICANT CHANGE UP (ref 6–8.3)
RBC # BLD: 4.16 M/UL — LOW (ref 4.2–5.8)
RBC # BLD: 4.16 M/UL — LOW (ref 4.2–5.8)
RBC # FLD: 17.7 % — HIGH (ref 10.3–14.5)
RBC # FLD: 17.7 % — HIGH (ref 10.3–14.5)
SAO2 % BLDV: 95.2 % — HIGH (ref 67–88)
SAO2 % BLDV: 95.2 % — HIGH (ref 67–88)
SODIUM SERPL-SCNC: 137 MMOL/L — SIGNIFICANT CHANGE UP (ref 135–145)
SODIUM SERPL-SCNC: 137 MMOL/L — SIGNIFICANT CHANGE UP (ref 135–145)
WBC # BLD: 16.91 K/UL — HIGH (ref 3.8–10.5)
WBC # BLD: 16.91 K/UL — HIGH (ref 3.8–10.5)
WBC # FLD AUTO: 16.91 K/UL — HIGH (ref 3.8–10.5)
WBC # FLD AUTO: 16.91 K/UL — HIGH (ref 3.8–10.5)

## 2023-11-14 PROCEDURE — 99233 SBSQ HOSP IP/OBS HIGH 50: CPT | Mod: GC

## 2023-11-14 PROCEDURE — 93306 TTE W/DOPPLER COMPLETE: CPT | Mod: 26

## 2023-11-14 PROCEDURE — 76376 3D RENDER W/INTRP POSTPROCES: CPT | Mod: 26

## 2023-11-14 RX ORDER — INSULIN LISPRO 100/ML
5 VIAL (ML) SUBCUTANEOUS
Refills: 0 | Status: DISCONTINUED | OUTPATIENT
Start: 2023-11-14 | End: 2023-11-14

## 2023-11-14 RX ORDER — POTASSIUM CHLORIDE 20 MEQ
20 PACKET (EA) ORAL ONCE
Refills: 0 | Status: COMPLETED | OUTPATIENT
Start: 2023-11-14 | End: 2023-11-14

## 2023-11-14 RX ORDER — METOPROLOL TARTRATE 50 MG
12.5 TABLET ORAL ONCE
Refills: 0 | Status: COMPLETED | OUTPATIENT
Start: 2023-11-14 | End: 2023-11-14

## 2023-11-14 RX ORDER — INSULIN LISPRO 100/ML
8 VIAL (ML) SUBCUTANEOUS
Refills: 0 | Status: DISCONTINUED | OUTPATIENT
Start: 2023-11-14 | End: 2023-11-15

## 2023-11-14 RX ORDER — INSULIN GLARGINE 100 [IU]/ML
20 INJECTION, SOLUTION SUBCUTANEOUS AT BEDTIME
Refills: 0 | Status: DISCONTINUED | OUTPATIENT
Start: 2023-11-14 | End: 2023-11-15

## 2023-11-14 RX ORDER — SODIUM CHLORIDE 9 MG/ML
500 INJECTION, SOLUTION INTRAVENOUS
Refills: 0 | Status: DISCONTINUED | OUTPATIENT
Start: 2023-11-14 | End: 2023-11-14

## 2023-11-14 RX ADMIN — APIXABAN 5 MILLIGRAM(S): 2.5 TABLET, FILM COATED ORAL at 17:46

## 2023-11-14 RX ADMIN — Medication 4: at 17:45

## 2023-11-14 RX ADMIN — Medication 12.5 MILLIGRAM(S): at 17:01

## 2023-11-14 RX ADMIN — Medication 10 MILLIGRAM(S): at 21:44

## 2023-11-14 RX ADMIN — Medication 40 MILLIGRAM(S): at 07:22

## 2023-11-14 RX ADMIN — AZITHROMYCIN 255 MILLIGRAM(S): 500 TABLET, FILM COATED ORAL at 22:44

## 2023-11-14 RX ADMIN — Medication 6: at 09:16

## 2023-11-14 RX ADMIN — Medication 12: at 12:57

## 2023-11-14 RX ADMIN — Medication 50 MILLIGRAM(S): at 05:52

## 2023-11-14 RX ADMIN — TAMSULOSIN HYDROCHLORIDE 0.4 MILLIGRAM(S): 0.4 CAPSULE ORAL at 21:44

## 2023-11-14 RX ADMIN — Medication 3 MILLILITER(S): at 21:45

## 2023-11-14 RX ADMIN — INSULIN GLARGINE 20 UNIT(S): 100 INJECTION, SOLUTION SUBCUTANEOUS at 22:50

## 2023-11-14 RX ADMIN — APIXABAN 5 MILLIGRAM(S): 2.5 TABLET, FILM COATED ORAL at 05:52

## 2023-11-14 RX ADMIN — LOSARTAN POTASSIUM 100 MILLIGRAM(S): 100 TABLET, FILM COATED ORAL at 05:52

## 2023-11-14 RX ADMIN — Medication 3 MILLILITER(S): at 17:46

## 2023-11-14 RX ADMIN — Medication 3 MILLILITER(S): at 04:37

## 2023-11-14 RX ADMIN — MONTELUKAST 10 MILLIGRAM(S): 4 TABLET, CHEWABLE ORAL at 12:56

## 2023-11-14 RX ADMIN — Medication 5 UNIT(S): at 12:57

## 2023-11-14 RX ADMIN — BUDESONIDE AND FORMOTEROL FUMARATE DIHYDRATE 2 PUFF(S): 160; 4.5 AEROSOL RESPIRATORY (INHALATION) at 09:22

## 2023-11-14 RX ADMIN — Medication 3 MILLILITER(S): at 12:28

## 2023-11-14 RX ADMIN — Medication 5 UNIT(S): at 17:45

## 2023-11-14 RX ADMIN — Medication 4: at 22:49

## 2023-11-14 RX ADMIN — BUDESONIDE AND FORMOTEROL FUMARATE DIHYDRATE 2 PUFF(S): 160; 4.5 AEROSOL RESPIRATORY (INHALATION) at 21:45

## 2023-11-14 RX ADMIN — Medication 20 MILLIEQUIVALENT(S): at 12:56

## 2023-11-14 NOTE — PROGRESS NOTE ADULT - SUBJECTIVE AND OBJECTIVE BOX
**************************************  Pati Curiel, PGY1  **************************************    INTERVAL HPI/OVERNIGHT EVENTS:  Patient was seen and examined at bedside. As per nurse and patient, no o/n events, patient resting comfortably. No complaints at this time. Patient denies: fever, chills, dizziness, weakness, HA, Changes in vision, CP, palpitations, SOB, cough, N/V/D/C, dysuria, changes in bowel movements, LE edema. ROS otherwise negative.    VITAL SIGNS:  T(F): 97.8 (11-14-23 @ 05:52)  HR: 94 (11-14-23 @ 05:52)  BP: 109/70 (11-14-23 @ 05:52)  RR: 18 (11-14-23 @ 05:52)  SpO2: 96% (11-14-23 @ 05:52)  Wt(kg): --    PHYSICAL EXAM:  General: WN/WD NAD, on nasal canula   Neurology: A&Ox3, nonfocal, AMARAL x 4  Eyes: PERRLA/ EOMI, Gross vision intact  ENT/Neck: Neck supple, trachea midline, No JVD, Gross hearing intact, No HJR  Respiratory: Wheezing and crackles bilaterally, no accessory muscle use  CV: irregular rhythm, +S1/S2, -S3/S4, no murmurs, rubs or gallops, no LE edema  Abdominal: Soft, NT, ND +BS, No HSM  MSK: 5/5 strength UE/LE bilaterally  Extremities: No edema, 2+ peripheral pulses  Skin: No Rashes, Hematoma, Ecchymosi      MEDICATIONS  (STANDING):  albuterol/ipratropium for Nebulization 3 milliLiter(s) Nebulizer every 6 hours  apixaban 5 milliGRAM(s) Oral every 12 hours  azithromycin  IVPB 500 milliGRAM(s) IV Intermittent every 24 hours  azithromycin  IVPB      budesonide 160 MICROgram(s)/formoterol 4.5 MICROgram(s) Inhaler 2 Puff(s) Inhalation two times a day  dextrose 5%. 1000 milliLiter(s) (50 mL/Hr) IV Continuous <Continuous>  dextrose 5%. 1000 milliLiter(s) (100 mL/Hr) IV Continuous <Continuous>  dextrose 50% Injectable 25 Gram(s) IV Push once  dextrose 50% Injectable 12.5 Gram(s) IV Push once  dextrose 50% Injectable 25 Gram(s) IV Push once  glucagon  Injectable 1 milliGRAM(s) IntraMuscular once  insulin glargine Injectable (LANTUS) 15 Unit(s) SubCutaneous at bedtime  insulin lispro (ADMELOG) corrective regimen sliding scale   SubCutaneous three times a day before meals  insulin lispro (ADMELOG) corrective regimen sliding scale   SubCutaneous at bedtime  losartan 100 milliGRAM(s) Oral daily  methylPREDNISolone sodium succinate Injectable 40 milliGRAM(s) IV Push every 8 hours  metoclopramide 10 milliGRAM(s) Oral at bedtime  metoprolol succinate ER 50 milliGRAM(s) Oral daily  montelukast 10 milliGRAM(s) Oral daily  tamsulosin 0.4 milliGRAM(s) Oral at bedtime    MEDICATIONS  (PRN):  dextrose Oral Gel 15 Gram(s) Oral once PRN Blood Glucose LESS THAN 70 milliGRAM(s)/deciliter      Allergies    No Known Allergies    Intolerances        LABS:                        12.5   8.93  )-----------( 262      ( 13 Nov 2023 06:47 )             36.9     11-13    135  |  95<L>  |  22  ----------------------------<  248<H>  4.2   |  25  |  1.27    Ca    9.0      13 Nov 2023 06:47  Phos  5.4     11-13  Mg     2.40     11-13    TPro  7.6  /  Alb  3.8  /  TBili  0.5  /  DBili  x   /  AST  7   /  ALT  6   /  AlkPhos  115  11-13      Urinalysis Basic - ( 13 Nov 2023 06:47 )    Color: x / Appearance: x / SG: x / pH: x  Gluc: 248 mg/dL / Ketone: x  / Bili: x / Urobili: x   Blood: x / Protein: x / Nitrite: x   Leuk Esterase: x / RBC: x / WBC x   Sq Epi: x / Non Sq Epi: x / Bacteria: x        RADIOLOGY & ADDITIONAL TESTS:  Reviewed

## 2023-11-14 NOTE — MEDICAL STUDENT PROGRESS NOTE(EDUCATION) - PLAN 1
With respiratory acidosis 2.2 CO2 retention- now resolved   Correlates to COPD exacerbation    - transition to room air- trend VBGs, check daily, start on nasal canula if needed  - O2 sat goal 90%-95%  Pulm notified patient is here- agree with course of action and can formally consult if needed    methyl-prednisonolone 40mg IV q8hrs d/c'd- now transitioned to PO taper for 5 days   - last dose of azithromycin 11/14  - c/w symbicort + Duonebs + singulair  - Duonebs q6hr.

## 2023-11-14 NOTE — PROGRESS NOTE ADULT - PROBLEM SELECTOR PLAN 1
With respiratory acidosis 2.2 CO2 retention  Correlates to COPD exacerbation    transition to nasal canula- trend VBGs, check daily  Pulm notified patient is here- agree with course of action and can formally consult if needed   C/w methyl-prednisonolone 40mg IV q8hrs, taper per pulm  c/w azithromycin  c/w symbicort + Duonebs + singulair  Duonebs q6hr With respiratory acidosis 2.2 CO2 retention- now resolved   Correlates to COPD exacerbation    - transition to room air- trend VBGs, check daily, start on nasal canula if needed  - O2 sat goal 90%-95%  Pulm notified patient is here- agree with course of action and can formally consult if needed    methyl-prednisonolone 40mg IV q8hrs d/c'd- now transitioned to PO taper for 5 days   - last dose of azithromycin 11/14  - c/w symbicort + Duonebs + singulair  - Duonebs q6hr

## 2023-11-14 NOTE — DISCHARGE NOTE PROVIDER - NSDCFUADDAPPT_GEN_ALL_CORE_FT
Emailed [email or web address] for follow up  Emailed [email or web address] for follow up   Please follow up with your cardiologist for the new findings in TTE  Emailed [email or web address] for follow up   Please follow up with your cardiologist for the new findings in TTE as well as missed cardiology appointment while inpatient.   Please use the information provided to schedule an appointment with [facility] Endocrinology.

## 2023-11-14 NOTE — DISCHARGE NOTE PROVIDER - CARE PROVIDERS DIRECT ADDRESSES
,[email or web address]

## 2023-11-14 NOTE — DISCHARGE NOTE PROVIDER - PROVIDER TOKENS
PROVIDER:[TOKEN:[[id number]]],PROVIDER:[TOKEN:[[id number]]] PROVIDER:[TOKEN:[[id number]]],PROVIDER:[TOKEN:[[id number]]],PROVIDER:[TOKEN:[[id number]]]

## 2023-11-14 NOTE — PROGRESS NOTE ADULT - PROBLEM SELECTOR PLAN 12
Activity: Bed rest on BIPAP  Bowel reg: miralax   Consultants: PULM   Diet: NPO on BIPAP  DVT ppx: AC  Dispo: pending clinical improvement  Directive: Full code Activity: ambulate as tolerated   Bowel reg: miralax   Consultants: none    Diet: consistent carb diet  DVT ppx: AC  Dispo: pending clinical improvement  Directive: Full code

## 2023-11-14 NOTE — MEDICAL STUDENT PROGRESS NOTE(EDUCATION) - ASSESSMENT
68M with PMH significant for COPD, asthma, extensive smoking history, T2DM, HTN, CAD and A.fib who presented on Sunday with a 4 day hx of a dry, non-productive cough and 2 day hx of shortness of breath. He was on BIPAP until Monday, after which he was transitioned to nasal canula and then taken off of oxygen on Monday night. His presentation with acute respiratory failure with hypercapnia is consistent with an asthma/COPD exacerbation which has now resolved.

## 2023-11-14 NOTE — DISCHARGE NOTE PROVIDER - NSDCCPCAREPLAN_GEN_ALL_CORE_FT
PRINCIPAL DISCHARGE DIAGNOSIS  Diagnosis: Acute asthma exacerbation  Assessment and Plan of Treatment: You were treated for your asthma exacerbation with BiPAP, oxygen through a nasal canula, steroids and antibiotics. It is important to avoid known irritants and decrease smoking. Please follow up with your pulmonnologist upon discharge.     PRINCIPAL DISCHARGE DIAGNOSIS  Diagnosis: Acute asthma exacerbation  Assessment and Plan of Treatment: You were treated for your asthma exacerbation with BiPAP, oxygen through a nasal canula, steroids and antibiotics. It is important to avoid known irritants and decrease smoking. Please follow up with your pulmonnologist upon discharge.      SECONDARY DISCHARGE DIAGNOSES  Diagnosis: Hyperglycemia  Assessment and Plan of Treatment: You were given steroids while inpatient due to your COPD exacerbation. Due to this, you have had elevated glucose levels. You should follow these directions to administer insulin while finishing your steroids at home.   While on prednisone 30 mg  20 units NPH in AM (same time as taking prednisone)  While on prednisone 20 m units NPH in AM (same time as taking prednisone)  While on prednisone 10 mg  10 units NPH in AM (same time as taking prednisone)  OFF prednisone:  continue only metformin 1g bid  no NPH  While ate home At home, you should check with a finger stick your blood glucose levels pre-meal and bedtime. You should call you primary care doctor when the blood glucose level is <70 or above 400 and/or consistently above 200s as changes in the regimen will have to be made.  Please follow up with [facility] endocrinology outpatient.        PRINCIPAL DISCHARGE DIAGNOSIS  Diagnosis: Acute asthma exacerbation  Assessment and Plan of Treatment: You were treated for your asthma exacerbation with BiPAP, oxygen through a nasal canula, steroids and antibiotics. It is important to avoid known irritants and decrease smoking. Please follow up with your pulmonnologist upon discharge.      SECONDARY DISCHARGE DIAGNOSES  Diagnosis: Hyperglycemia  Assessment and Plan of Treatment: You were given steroids while inpatient due to your COPD exacerbation. Due to this, you have had elevated glucose levels. You should follow these directions to administer insulin while finishing your steroids at home.   While on prednisone 30 mg  20 units NPH in AM (same time as taking prednisone)  While on prednisone 20 mg:  15 units NPH in AM (same time as taking prednisone)  While on prednisone 10 mg  10 units NPH in AM (same time as taking prednisone)  Resume metformin when no longer on steroids.   While ate home at home, you should check with a finger stick your blood glucose levels pre-meal and bedtime. You should call you primary care doctor when the blood glucose level is <70 or above 400 and/or consistently above 200s as changes in the regimen will have to be made.  Please follow up with [facility] endocrinology outpatient.        PRINCIPAL DISCHARGE DIAGNOSIS  Diagnosis: Acute asthma exacerbation  Assessment and Plan of Treatment: You were treated for your asthma exacerbation with BiPAP, oxygen through a nasal canula, steroids and antibiotics. It is important to avoid known irritants and decrease smoking. Please follow up with your pulmonnologist upon discharge.  Prednisone taper:   - 3 tabs 11/17 and 11/18  - 2 tabs 11/19 and 11/20  - 1 tab 11/21 and 11/22      SECONDARY DISCHARGE DIAGNOSES  Diagnosis: Hyperglycemia  Assessment and Plan of Treatment: You were given steroids while inpatient due to your COPD exacerbation. Due to this, you have had elevated glucose levels. You should follow these directions to administer insulin while finishing your steroids at home.   While on prednisone 30 mg  20 units NPH in AM (same time as taking prednisone)  While on prednisone 20 mg:  15 units NPH in AM (same time as taking prednisone)  While on prednisone 10 mg  10 units NPH in AM (same time as taking prednisone)  Resume metformin when no longer on steroids.   While ate home at home, you should check with a finger stick your blood glucose levels pre-meal and bedtime. You should call you primary care doctor when the blood glucose level is <70 or above 400 and/or consistently above 200s as changes in the regimen will have to be made.  Please follow up with [facility] endocrinology outpatient.

## 2023-11-14 NOTE — DISCHARGE NOTE PROVIDER - NSDCCPTREATMENT_GEN_ALL_CORE_FT
PRINCIPAL PROCEDURE  Procedure: XR chest AP  Findings and Treatment: There is no pneumothorax or pleural effusion.  No acute bony abnormality. Pt was started on BiPAP for COPD exacerbation, transitioned to nasal canula, and then room air, also started on azithromycin for COPD exacerbation.      SECONDARY PROCEDURE  Procedure: Transthoracic echocardiography (TTE)  Findings and Treatment: Decreased EF from previous TTE in January 2023. Recommendations from cardiology to f/u outpatient.

## 2023-11-14 NOTE — PROGRESS NOTE ADULT - PROBLEM SELECTOR PLAN 6
A1C 8.7%  On discharge previously he required lantus 40 admelog 28 for steroid induced hyperglycemia and just recently had been taken off of insulin (long acting one month ago and short acting 6 months ago due to hypoglycemia)    Transitioned to NC, advanced diet to carb consistent diet   Lantus 15u   Lispro 5u before meals   ISS q6hr  FSq6hr A1C 8.7%  On discharge previously he required lantus 40 admelog 28 for steroid induced hyperglycemia and just recently had been taken off of insulin (long acting one month ago and short acting 6 months ago due to hypoglycemia)    Transitioned to NC, advanced diet to carb consistent diet   Lantus 20u   Lispro 5u before meals   ISS q6hr  FSq6hr

## 2023-11-14 NOTE — DISCHARGE NOTE PROVIDER - NSDCFUSCHEDAPPT_GEN_ALL_CORE_FT
[facility] Physician [facility]  CARDIOLOGY [id number]  Scheduled Appointment: 11/15/2023    [facility]  PULMMED [address] [facility]  Scheduled Appointment: 12/13/2023     [facility] Physician [address]  Scheduled Appointment: 12/13/2023

## 2023-11-14 NOTE — PROGRESS NOTE ADULT - PROBLEM SELECTOR PLAN 3
CXR w/o focal consolidations however appears more vol ol especially at bilateral bases compared to prior cxr    TTE  c/w metoprolol ER 50mg qd  c/w home Losartan  IV Lasix 40mg qD (not on at home) prn CXR w/o focal consolidations however appears more vol ol especially at bilateral bases compared to prior cxr    TTE  c/w metoprolol ER 50mg qd  d/c home Losartan as per nephro recs

## 2023-11-14 NOTE — DISCHARGE NOTE PROVIDER - NSDCMRMEDTOKEN_GEN_ALL_CORE_FT
atorvastatin 80 mg oral tablet: 1 tab(s) orally once a day  Eliquis 5 mg oral tablet: 1 tab(s) orally 2 times a day  ergocalciferol 1.25 mg (50,000 intl units) oral tablet: orally once a week  Flomax 0.4 mg oral capsule: 1 cap(s) orally once a day  folic acid 1 mg oral tablet: 1 tab(s) orally once a day  hydroCHLOROthiazide 25 mg oral tablet: 1 tab(s) orally once a day  losartan 100 mg oral tablet: 1 tab(s) orally once a day  metFORMIN 1000 mg oral tablet: 1 tab(s) orally 2 times a day  metoprolol succinate 50 mg oral capsule, extended release: 1 cap(s) orally once a day  Proventil HFA 90 mcg/inh inhalation aerosol: 2 puff(s) inhaled every 6 hours as needed for  bronchospasm  Reglan 10 mg oral tablet: 1 tab(s) orally once a day (at bedtime)  Singulair 10 mg oral tablet: 1 tab(s) orally once a day  Symbicort 160 mcg-4.5 mcg/inh inhalation aerosol: 2 puff(s) inhaled 2 times a day  Vitamin B12 1000 mcg oral tablet: 1 tab(s) orally once a day   atorvastatin 80 mg oral tablet: 1 tab(s) orally once a day  Eliquis 5 mg oral tablet: 1 tab(s) orally 2 times a day  ergocalciferol 1.25 mg (50,000 intl units) oral tablet: orally once a week  Flomax 0.4 mg oral capsule: 1 cap(s) orally once a day  folic acid 1 mg oral tablet: 1 tab(s) orally once a day  hydroCHLOROthiazide 25 mg oral tablet: 1 tab(s) orally once a day  losartan 100 mg oral tablet: 1 tab(s) orally once a day  metFORMIN 1000 mg oral tablet: 1 tab(s) orally 2 times a day  metoprolol succinate 25 mg oral tablet, extended release: 3 tab(s) orally once a day  NovoLIN 70/30 subcutaneous suspension: 10 unit(s) subcutaneous once a day Inject 20 units before breakfast. Do not take if not eating within 30 minutes.  Proventil HFA 90 mcg/inh inhalation aerosol: 2 puff(s) inhaled every 6 hours as needed for  bronchospasm  Reglan 10 mg oral tablet: 1 tab(s) orally once a day (at bedtime)  Singulair 10 mg oral tablet: 1 tab(s) orally once a day  Symbicort 160 mcg-4.5 mcg/inh inhalation aerosol: 2 puff(s) inhaled 2 times a day  Vitamin B12 1000 mcg oral tablet: 1 tab(s) orally once a day   alcohol swabs: Apply topically to affected area 4 times a day  atorvastatin 80 mg oral tablet: 1 tab(s) orally once a day  Eliquis 5 mg oral tablet: 1 tab(s) orally 2 times a day  ergocalciferol 1.25 mg (50,000 intl units) oral tablet: orally once a week  Flomax 0.4 mg oral capsule: 1 cap(s) orally once a day  folic acid 1 mg oral tablet: 1 tab(s) orally once a day  Freestyle Juan 2 Sensors: Apply 1 sensor every 14 days  glucose tablets: Follow instructions on bottle when sugar is low.  hydroCHLOROthiazide 25 mg oral tablet: 1 tab(s) orally once a day  Insulin Pen Needles, 4mm: 1 application subcutaneously 4 times a day. ** Use with insulin pen **  losartan 100 mg oral tablet: 1 tab(s) orally once a day  metFORMIN 1000 mg oral tablet: 1 tab(s) orally 2 times a day  metoprolol succinate 25 mg oral tablet, extended release: 3 tab(s) orally once a day  NovoLIN 70/30 subcutaneous suspension: 10 unit(s) subcutaneous once a day Inject 20 units before breakfast. Do not take if not eating within 30 minutes.  predniSONE 10 mg oral tablet: 1 tab(s) orally once a day Please take 3 tabs 11/17-18, 2 tabs 11/18-19, and 1 tab on 11/20-21  Proventil HFA 90 mcg/inh inhalation aerosol: 2 puff(s) inhaled every 6 hours as needed for  bronchospasm  Reglan 10 mg oral tablet: 1 tab(s) orally once a day (at bedtime)  Singulair 10 mg oral tablet: 1 tab(s) orally once a day  Symbicort 160 mcg-4.5 mcg/inh inhalation aerosol: 2 puff(s) inhaled 2 times a day  Vitamin B12 1000 mcg oral tablet: 1 tab(s) orally once a day

## 2023-11-14 NOTE — DISCHARGE NOTE PROVIDER - CARE PROVIDER_API CALL
Simran Carlisle  Cardiac Electrophysiology  [address] [address] [place] [address], [address]  [place] [address]  Phone: [phone]  Fax: [phone]  Follow Up Time:     Kwabena Alcazar.  Pulmonary Disease  [address] [facility], [address]  [place] [address]  Phone: [phone]  Fax: [phone]  Follow Up Time:    Simran Carlisle  Cardiac Electrophysiology  [address] [place] [address]  Phone: [phone]  Fax: [phone]  Follow Up Time:     Kwabena Alcazar  Pulmonary Disease  [address] [facility] [facility] [address]  [place] [address]  Phone: [phone]  Fax: [phone]  Follow Up Time:     Karin Swenson  Endocrinology/Metab/Diabetes  [address] [place] [address]  Phone: [phone]  Fax: [phone]  Follow Up Time:

## 2023-11-14 NOTE — PROGRESS NOTE ADULT - ASSESSMENT
68M Chinese-speaking w/ COPD/Asthma overlap current smoker with >20pack years, T2DM, HTN, CAD s/p 1 stent, Afib presenting with coughx4 days and SOB x2days concerning for asthma exacerbation vs new CHF. Admitted to medicine for further evaluation.

## 2023-11-14 NOTE — CONSULT NOTE ADULT - NS ATTEND AMEND GEN_ALL_CORE FT
Patient known to our practice  CHRISTA-likely pre-renal-hold ARB till scr improves. s/p IVF  MOnitor renal function closely

## 2023-11-14 NOTE — DISCHARGE NOTE PROVIDER - HOSPITAL COURSE
67yo Tamazight-speaking male pmhx Afib on Eliquis, CAD w/ stent, HTN, HLD, T2DM, Asthma, BIBEMS for respiratory distress. Patient reported he had been feeling unwell for the past 3-4 days with cough and SOB. At home he was using his rescue Albuterol with minimal relief. Today the patient experienced acute worsening and was in respiratory distress and EMS was called. Patient denied recent travel, sick contacts. EMS placed him on CPAP and he received Nebs x2, Mag 2g, Decadron 12mg with minimal relief.     ED COURSE  In the ED pt w/ B-lines on pocus, noted to have crackles. Switched to BPAP, received Lasix 40mg.     Pt was then admitted to the medicine service. He was continued on BiPAP until 11/13 and then transitioned to nasal canula. Pt tolerated nasal canula well and was then transitioned to room air. Pt was monitored closely on telemetry. Pt was given final dose of IV methylprednisolone on 11/14 and transitioned to PO for all subsequent days.       69yo Kinyarwanda-speaking male pmhx Afib on Eliquis, CAD w/ stent, HTN, HLD, T2DM, Asthma, BIBEMS for respiratory distress. Patient reported he had been feeling unwell for the past 3-4 days with cough and SOB. At home he was using his rescue Albuterol with minimal relief. Today the patient experienced acute worsening and was in respiratory distress and EMS was called. Patient denied recent travel, sick contacts. EMS placed him on CPAP and he received Nebs x2, Mag 2g, Decadron 12mg with minimal relief.     ED COURSE  In the ED pt w/ B-lines on pocus, noted to have crackles. Switched to BPAP, received Lasix 40mg.     Pt was then admitted to the medicine service. He was continued on BiPAP until 11/13 and then transitioned to nasal canula. Pt tolerated nasal canula well and was then transitioned to room air. Pt was monitored closely on telemetry. Pt was given final dose of IV methylprednisolone on 11/14 and transitioned to PO for all subsequent days. TTE completed on 11/14 with decrease in EF from previous TTE completed in January 2023. Cardiology recommending outpatient f/u.  Pt now medically optimized for discharge home.       69yo Amharic-speaking male pmhx Afib on Eliquis, CAD w/ stent, HTN, HLD, T2DM, Asthma, BIBEMS for respiratory distress. Patient reported he had been feeling unwell for the past 3-4 days with cough and SOB. At home he was using his rescue Albuterol with minimal relief. Today the patient experienced acute worsening and was in respiratory distress and EMS was called. Patient denied recent travel, sick contacts. EMS placed him on CPAP and he received Nebs x2, Mag 2g, Decadron 12mg with minimal relief.     ED COURSE  In the ED pt w/ B-lines on pocus, noted to have crackles. Switched to BiPAP, received Lasix 40mg.     Pt was then admitted to the medicine service. He was continued on BiPAP until 11/13 and then transitioned to nasal canula. Pt tolerated nasal canula well and was then transitioned to room air. Pt was monitored closely on telemetry. Pt was given final dose of IV methylprednisolone on 11/14 and transitioned to PO for all subsequent days. TTE completed on 11/14 with decrease in EF from previous TTE completed in January 2023. Cardiology recommending outpatient f/u.  Pt now medically optimized for discharge home.       67yo Nepali-speaking male pmhx Afib on Eliquis, CAD w/ stent, HTN, HLD, T2DM, Asthma, BIBEMS for respiratory distress. Patient reported he had been feeling unwell for the past 3-4 days with cough and SOB. At home he was using his rescue Albuterol with minimal relief. Today the patient experienced acute worsening and was in respiratory distress and EMS was called. Patient denied recent travel, sick contacts. EMS placed him on CPAP and he received Nebs x2, Mag 2g, Decadron 12mg with minimal relief.     ED COURSE  In the ED pt w/ B-lines on pocus, noted to have crackles. Switched to BiPAP, received Lasix 40mg.     Pt was then admitted to the medicine service. He was continued on BiPAP until 11/13 and then transitioned to nasal canula, w/ resolution of hypercapnia. Pt tolerated nasal canula well and was then transitioned to room air, normal ambulatory sat. Pt was monitored closely on telemetry; w/ intermittent Afib w/ RVR, and metoprolol succinate was increased 50mg --> 75mg qd. Pt was given final dose of IV methylprednisolone on 11/14 and transitioned to PO for all subsequent days, to complete prednisone taper. Course c/b steroid hyperglycemia; endocrine consulted and patient started on basal/bolus, and discharged on NPH while completing steroid taper. TTE completed on 11/14 with decrease in EF from previous TTE completed in January 2023, new LV and RV systolic dysfunction. Cardiology recommending outpatient f/u.  Pt now medically optimized for discharge home.     Changes to medications:   - prednisone taper ending 11/22  - NPH while on prednisone  - increased metoprolol succinate 50mg qd --> 75mg qd    Follow-up:   - cardiology  - pulmonology  - endocrinology (establish care)

## 2023-11-15 ENCOUNTER — APPOINTMENT (OUTPATIENT)
Dept: CARDIOLOGY | Facility: HOSPITAL | Age: 68
End: 2023-11-15

## 2023-11-15 DIAGNOSIS — R73.9 HYPERGLYCEMIA, UNSPECIFIED: ICD-10-CM

## 2023-11-15 DIAGNOSIS — E78.5 HYPERLIPIDEMIA, UNSPECIFIED: ICD-10-CM

## 2023-11-15 LAB
ALBUMIN SERPL ELPH-MCNC: 3.9 G/DL — SIGNIFICANT CHANGE UP (ref 3.3–5)
ALBUMIN SERPL ELPH-MCNC: 3.9 G/DL — SIGNIFICANT CHANGE UP (ref 3.3–5)
ALP SERPL-CCNC: 98 U/L — SIGNIFICANT CHANGE UP (ref 40–120)
ALP SERPL-CCNC: 98 U/L — SIGNIFICANT CHANGE UP (ref 40–120)
ALT FLD-CCNC: 15 U/L — SIGNIFICANT CHANGE UP (ref 4–41)
ALT FLD-CCNC: 15 U/L — SIGNIFICANT CHANGE UP (ref 4–41)
ANION GAP SERPL CALC-SCNC: 13 MMOL/L — SIGNIFICANT CHANGE UP (ref 7–14)
ANION GAP SERPL CALC-SCNC: 13 MMOL/L — SIGNIFICANT CHANGE UP (ref 7–14)
AST SERPL-CCNC: 14 U/L — SIGNIFICANT CHANGE UP (ref 4–40)
AST SERPL-CCNC: 14 U/L — SIGNIFICANT CHANGE UP (ref 4–40)
BASE EXCESS BLDV CALC-SCNC: -1.7 MMOL/L — SIGNIFICANT CHANGE UP (ref -2–3)
BASE EXCESS BLDV CALC-SCNC: -1.7 MMOL/L — SIGNIFICANT CHANGE UP (ref -2–3)
BILIRUB SERPL-MCNC: 0.4 MG/DL — SIGNIFICANT CHANGE UP (ref 0.2–1.2)
BILIRUB SERPL-MCNC: 0.4 MG/DL — SIGNIFICANT CHANGE UP (ref 0.2–1.2)
BLOOD GAS VENOUS COMPREHENSIVE RESULT: SIGNIFICANT CHANGE UP
BLOOD GAS VENOUS COMPREHENSIVE RESULT: SIGNIFICANT CHANGE UP
BUN SERPL-MCNC: 35 MG/DL — HIGH (ref 7–23)
BUN SERPL-MCNC: 35 MG/DL — HIGH (ref 7–23)
CALCIUM SERPL-MCNC: 8.8 MG/DL — SIGNIFICANT CHANGE UP (ref 8.4–10.5)
CALCIUM SERPL-MCNC: 8.8 MG/DL — SIGNIFICANT CHANGE UP (ref 8.4–10.5)
CHLORIDE BLDV-SCNC: 104 MMOL/L — SIGNIFICANT CHANGE UP (ref 96–108)
CHLORIDE BLDV-SCNC: 104 MMOL/L — SIGNIFICANT CHANGE UP (ref 96–108)
CHLORIDE SERPL-SCNC: 103 MMOL/L — SIGNIFICANT CHANGE UP (ref 98–107)
CHLORIDE SERPL-SCNC: 103 MMOL/L — SIGNIFICANT CHANGE UP (ref 98–107)
CO2 BLDV-SCNC: 25.7 MMOL/L — SIGNIFICANT CHANGE UP (ref 22–26)
CO2 BLDV-SCNC: 25.7 MMOL/L — SIGNIFICANT CHANGE UP (ref 22–26)
CO2 SERPL-SCNC: 25 MMOL/L — SIGNIFICANT CHANGE UP (ref 22–31)
CO2 SERPL-SCNC: 25 MMOL/L — SIGNIFICANT CHANGE UP (ref 22–31)
CREAT SERPL-MCNC: 1.24 MG/DL — SIGNIFICANT CHANGE UP (ref 0.5–1.3)
CREAT SERPL-MCNC: 1.24 MG/DL — SIGNIFICANT CHANGE UP (ref 0.5–1.3)
EGFR: 63 ML/MIN/1.73M2 — SIGNIFICANT CHANGE UP
EGFR: 63 ML/MIN/1.73M2 — SIGNIFICANT CHANGE UP
GAS PNL BLDV: 138 MMOL/L — SIGNIFICANT CHANGE UP (ref 136–145)
GAS PNL BLDV: 138 MMOL/L — SIGNIFICANT CHANGE UP (ref 136–145)
GAS PNL BLDV: SIGNIFICANT CHANGE UP
GAS PNL BLDV: SIGNIFICANT CHANGE UP
GLUCOSE BLDC GLUCOMTR-MCNC: 100 MG/DL — HIGH (ref 70–99)
GLUCOSE BLDC GLUCOMTR-MCNC: 100 MG/DL — HIGH (ref 70–99)
GLUCOSE BLDC GLUCOMTR-MCNC: 181 MG/DL — HIGH (ref 70–99)
GLUCOSE BLDC GLUCOMTR-MCNC: 181 MG/DL — HIGH (ref 70–99)
GLUCOSE BLDC GLUCOMTR-MCNC: 215 MG/DL — HIGH (ref 70–99)
GLUCOSE BLDC GLUCOMTR-MCNC: 215 MG/DL — HIGH (ref 70–99)
GLUCOSE BLDC GLUCOMTR-MCNC: 317 MG/DL — HIGH (ref 70–99)
GLUCOSE BLDC GLUCOMTR-MCNC: 317 MG/DL — HIGH (ref 70–99)
GLUCOSE BLDC GLUCOMTR-MCNC: 470 MG/DL — CRITICAL HIGH (ref 70–99)
GLUCOSE BLDC GLUCOMTR-MCNC: 470 MG/DL — CRITICAL HIGH (ref 70–99)
GLUCOSE BLDC GLUCOMTR-MCNC: 480 MG/DL — CRITICAL HIGH (ref 70–99)
GLUCOSE BLDC GLUCOMTR-MCNC: 480 MG/DL — CRITICAL HIGH (ref 70–99)
GLUCOSE BLDC GLUCOMTR-MCNC: 67 MG/DL — LOW (ref 70–99)
GLUCOSE BLDC GLUCOMTR-MCNC: 67 MG/DL — LOW (ref 70–99)
GLUCOSE BLDC GLUCOMTR-MCNC: 71 MG/DL — SIGNIFICANT CHANGE UP (ref 70–99)
GLUCOSE BLDC GLUCOMTR-MCNC: 71 MG/DL — SIGNIFICANT CHANGE UP (ref 70–99)
GLUCOSE BLDV-MCNC: 380 MG/DL — HIGH (ref 70–99)
GLUCOSE BLDV-MCNC: 380 MG/DL — HIGH (ref 70–99)
GLUCOSE SERPL-MCNC: 333 MG/DL — HIGH (ref 70–99)
GLUCOSE SERPL-MCNC: 333 MG/DL — HIGH (ref 70–99)
HCO3 BLDV-SCNC: 24 MMOL/L — SIGNIFICANT CHANGE UP (ref 22–29)
HCO3 BLDV-SCNC: 24 MMOL/L — SIGNIFICANT CHANGE UP (ref 22–29)
HCT VFR BLD CALC: 37.8 % — LOW (ref 39–50)
HCT VFR BLD CALC: 37.8 % — LOW (ref 39–50)
HCT VFR BLDA CALC: 37 % — LOW (ref 39–51)
HCT VFR BLDA CALC: 37 % — LOW (ref 39–51)
HGB BLD CALC-MCNC: 12.3 G/DL — LOW (ref 12.6–17.4)
HGB BLD CALC-MCNC: 12.3 G/DL — LOW (ref 12.6–17.4)
HGB BLD-MCNC: 12.4 G/DL — LOW (ref 13–17)
HGB BLD-MCNC: 12.4 G/DL — LOW (ref 13–17)
LACTATE BLDV-MCNC: 2.7 MMOL/L — HIGH (ref 0.5–2)
LACTATE BLDV-MCNC: 2.7 MMOL/L — HIGH (ref 0.5–2)
MAGNESIUM SERPL-MCNC: 2.2 MG/DL — SIGNIFICANT CHANGE UP (ref 1.6–2.6)
MAGNESIUM SERPL-MCNC: 2.2 MG/DL — SIGNIFICANT CHANGE UP (ref 1.6–2.6)
MCHC RBC-ENTMCNC: 28.3 PG — SIGNIFICANT CHANGE UP (ref 27–34)
MCHC RBC-ENTMCNC: 28.3 PG — SIGNIFICANT CHANGE UP (ref 27–34)
MCHC RBC-ENTMCNC: 32.8 GM/DL — SIGNIFICANT CHANGE UP (ref 32–36)
MCHC RBC-ENTMCNC: 32.8 GM/DL — SIGNIFICANT CHANGE UP (ref 32–36)
MCV RBC AUTO: 86.3 FL — SIGNIFICANT CHANGE UP (ref 80–100)
MCV RBC AUTO: 86.3 FL — SIGNIFICANT CHANGE UP (ref 80–100)
NRBC # BLD: 0 /100 WBCS — SIGNIFICANT CHANGE UP (ref 0–0)
NRBC # BLD: 0 /100 WBCS — SIGNIFICANT CHANGE UP (ref 0–0)
NRBC # FLD: 0 K/UL — SIGNIFICANT CHANGE UP (ref 0–0)
NRBC # FLD: 0 K/UL — SIGNIFICANT CHANGE UP (ref 0–0)
PCO2 BLDV: 45 MMHG — SIGNIFICANT CHANGE UP (ref 42–55)
PCO2 BLDV: 45 MMHG — SIGNIFICANT CHANGE UP (ref 42–55)
PH BLDV: 7.34 — SIGNIFICANT CHANGE UP (ref 7.32–7.43)
PH BLDV: 7.34 — SIGNIFICANT CHANGE UP (ref 7.32–7.43)
PHOSPHATE SERPL-MCNC: 4.2 MG/DL — SIGNIFICANT CHANGE UP (ref 2.5–4.5)
PHOSPHATE SERPL-MCNC: 4.2 MG/DL — SIGNIFICANT CHANGE UP (ref 2.5–4.5)
PLATELET # BLD AUTO: 292 K/UL — SIGNIFICANT CHANGE UP (ref 150–400)
PLATELET # BLD AUTO: 292 K/UL — SIGNIFICANT CHANGE UP (ref 150–400)
PO2 BLDV: 53 MMHG — HIGH (ref 25–45)
PO2 BLDV: 53 MMHG — HIGH (ref 25–45)
POTASSIUM BLDV-SCNC: 3.5 MMOL/L — SIGNIFICANT CHANGE UP (ref 3.5–5.1)
POTASSIUM BLDV-SCNC: 3.5 MMOL/L — SIGNIFICANT CHANGE UP (ref 3.5–5.1)
POTASSIUM SERPL-MCNC: 3.6 MMOL/L — SIGNIFICANT CHANGE UP (ref 3.5–5.3)
POTASSIUM SERPL-MCNC: 3.6 MMOL/L — SIGNIFICANT CHANGE UP (ref 3.5–5.3)
POTASSIUM SERPL-SCNC: 3.6 MMOL/L — SIGNIFICANT CHANGE UP (ref 3.5–5.3)
POTASSIUM SERPL-SCNC: 3.6 MMOL/L — SIGNIFICANT CHANGE UP (ref 3.5–5.3)
PROT SERPL-MCNC: 7.5 G/DL — SIGNIFICANT CHANGE UP (ref 6–8.3)
PROT SERPL-MCNC: 7.5 G/DL — SIGNIFICANT CHANGE UP (ref 6–8.3)
RBC # BLD: 4.38 M/UL — SIGNIFICANT CHANGE UP (ref 4.2–5.8)
RBC # BLD: 4.38 M/UL — SIGNIFICANT CHANGE UP (ref 4.2–5.8)
RBC # FLD: 18.1 % — HIGH (ref 10.3–14.5)
RBC # FLD: 18.1 % — HIGH (ref 10.3–14.5)
SAO2 % BLDV: 79.1 % — SIGNIFICANT CHANGE UP (ref 67–88)
SAO2 % BLDV: 79.1 % — SIGNIFICANT CHANGE UP (ref 67–88)
SODIUM SERPL-SCNC: 141 MMOL/L — SIGNIFICANT CHANGE UP (ref 135–145)
SODIUM SERPL-SCNC: 141 MMOL/L — SIGNIFICANT CHANGE UP (ref 135–145)
WBC # BLD: 15.99 K/UL — HIGH (ref 3.8–10.5)
WBC # BLD: 15.99 K/UL — HIGH (ref 3.8–10.5)
WBC # FLD AUTO: 15.99 K/UL — HIGH (ref 3.8–10.5)
WBC # FLD AUTO: 15.99 K/UL — HIGH (ref 3.8–10.5)

## 2023-11-15 PROCEDURE — 99255 IP/OBS CONSLTJ NEW/EST HI 80: CPT

## 2023-11-15 PROCEDURE — 99232 SBSQ HOSP IP/OBS MODERATE 35: CPT | Mod: GC

## 2023-11-15 RX ORDER — METOPROLOL TARTRATE 50 MG
25 TABLET ORAL ONCE
Refills: 0 | Status: COMPLETED | OUTPATIENT
Start: 2023-11-15 | End: 2023-11-15

## 2023-11-15 RX ORDER — POTASSIUM CHLORIDE 20 MEQ
40 PACKET (EA) ORAL ONCE
Refills: 0 | Status: COMPLETED | OUTPATIENT
Start: 2023-11-15 | End: 2023-11-15

## 2023-11-15 RX ORDER — METOPROLOL TARTRATE 50 MG
75 TABLET ORAL DAILY
Refills: 0 | Status: DISCONTINUED | OUTPATIENT
Start: 2023-11-16 | End: 2023-11-16

## 2023-11-15 RX ORDER — INSULIN GLARGINE 100 [IU]/ML
34 INJECTION, SOLUTION SUBCUTANEOUS AT BEDTIME
Refills: 0 | Status: DISCONTINUED | OUTPATIENT
Start: 2023-11-15 | End: 2023-11-15

## 2023-11-15 RX ORDER — INSULIN LISPRO 100/ML
10 VIAL (ML) SUBCUTANEOUS
Refills: 0 | Status: DISCONTINUED | OUTPATIENT
Start: 2023-11-15 | End: 2023-11-15

## 2023-11-15 RX ORDER — INSULIN LISPRO 100/ML
18 VIAL (ML) SUBCUTANEOUS
Refills: 0 | Status: DISCONTINUED | OUTPATIENT
Start: 2023-11-15 | End: 2023-11-16

## 2023-11-15 RX ORDER — INSULIN GLARGINE 100 [IU]/ML
17 INJECTION, SOLUTION SUBCUTANEOUS AT BEDTIME
Refills: 0 | Status: COMPLETED | OUTPATIENT
Start: 2023-11-15 | End: 2023-11-15

## 2023-11-15 RX ORDER — INSULIN GLARGINE 100 [IU]/ML
25 INJECTION, SOLUTION SUBCUTANEOUS AT BEDTIME
Refills: 0 | Status: DISCONTINUED | OUTPATIENT
Start: 2023-11-15 | End: 2023-11-15

## 2023-11-15 RX ADMIN — MONTELUKAST 10 MILLIGRAM(S): 4 TABLET, CHEWABLE ORAL at 12:18

## 2023-11-15 RX ADMIN — Medication 4: at 09:05

## 2023-11-15 RX ADMIN — Medication 3 MILLILITER(S): at 10:21

## 2023-11-15 RX ADMIN — Medication 8 UNIT(S): at 13:14

## 2023-11-15 RX ADMIN — TAMSULOSIN HYDROCHLORIDE 0.4 MILLIGRAM(S): 0.4 CAPSULE ORAL at 21:38

## 2023-11-15 RX ADMIN — Medication 12: at 13:14

## 2023-11-15 RX ADMIN — Medication 40 MILLIEQUIVALENT(S): at 13:14

## 2023-11-15 RX ADMIN — Medication 18 UNIT(S): at 17:39

## 2023-11-15 RX ADMIN — Medication 40 MILLIGRAM(S): at 06:32

## 2023-11-15 RX ADMIN — Medication 3 MILLILITER(S): at 04:10

## 2023-11-15 RX ADMIN — BUDESONIDE AND FORMOTEROL FUMARATE DIHYDRATE 2 PUFF(S): 160; 4.5 AEROSOL RESPIRATORY (INHALATION) at 10:22

## 2023-11-15 RX ADMIN — APIXABAN 5 MILLIGRAM(S): 2.5 TABLET, FILM COATED ORAL at 17:38

## 2023-11-15 RX ADMIN — APIXABAN 5 MILLIGRAM(S): 2.5 TABLET, FILM COATED ORAL at 06:30

## 2023-11-15 RX ADMIN — Medication 8 UNIT(S): at 09:08

## 2023-11-15 RX ADMIN — INSULIN GLARGINE 17 UNIT(S): 100 INJECTION, SOLUTION SUBCUTANEOUS at 23:54

## 2023-11-15 RX ADMIN — Medication 10 MILLIGRAM(S): at 21:38

## 2023-11-15 RX ADMIN — Medication 25 MILLIGRAM(S): at 17:38

## 2023-11-15 RX ADMIN — Medication 50 MILLIGRAM(S): at 06:30

## 2023-11-15 RX ADMIN — Medication 8: at 17:39

## 2023-11-15 RX ADMIN — BUDESONIDE AND FORMOTEROL FUMARATE DIHYDRATE 2 PUFF(S): 160; 4.5 AEROSOL RESPIRATORY (INHALATION) at 22:09

## 2023-11-15 NOTE — CONSULT NOTE ADULT - ASSESSMENT
68M Syriac-speaking w/ COPD/Asthma overlap current smoker with >20pack years, T2DM, gastroparesis, HTN, CAD s/p 1 stent, Afib presenting with coughx4 days and SOB x2days. with worsen CHEW. nephrology consulted for CHRISTA    CHRISTA  baseline ~ 1.1  now with worsen sec  given lasix 40x1  on losartan 100 daily  uncontrolled hyperglycemia on steroid for copd  CHRISTA likely prerenal   hold losartan until christa resolves and hyperglycemia better controlled  ordered for LR @ 50cc/hr x5 hrs by team  check urine cr, urea, UA, renal us to r/o other etiology  monitor  avoid nephrotoxic agents    sob  likely asthma/copd  f/u pulm  on steroid
A/P: 67 yo M with uncontrolled DM2, asthma, HTN, CAD, afib presenting with SOB, COPD exacerbation, consulted for DM and steroid induced hyperglycemia. Endocrine team consulted for uncontrolled diabetes. Patient is high risk with high level decision making due to uncontrolled diabetes which places patient at high risk for cardiovascular and cerebrovascular events. Patient with lability of glucose requiring close monitoring and insulin adjustments.    #Type 2 Diabetes Mellitus c/b steroid induced hyperglycemia  - HbA1c 8.7% ; home regimen: metformin 1 g bid    Steroid taper:  s/p methylpred 40 mg on 11/14  s/p prednisone 40 mg on 11/15 today  11/16 prednisone 40 mg  11/17-18 prednisone 30 mg  11/19-20 prednisone 20 mg  11/21-23 prednisone 10 mg    - Recommend lantus 34 units QHS  - Recommend admelog 18 unit with meals  - Recommend moderate admelog correction scale TIDQAC and QHS  - Please check FSG before meals and QHS, or q6h while NPO  - RD consult  - hypoglycemia orderset prn  - son will help with insulin on discharge   - Discharge planning:  basal-bolus insulin + metformin 1000 mg bid (egfr 63)    ***TENTATIVE DISCHARGE PLAN - WILL NEED TO BE MODIFIED BEFORE DISCHARGE***  While on prednisone 30 mg  lantus 28 units at bedtime  Admelog 14 units before meals    While on prednisone 20 mg:  lantus 22 units at bedtime  Admelog 12 units before meals    While on prednisone 10 mg  lantus 18 units at bedtime  admelog 10 units before meals    OFF prednisone:  lantus 15 units  metformin 1g bid  no admelog    If off steroids and:  -morning sugars are less than 80, reduce lantus to 12 units    Can also provide correction scale while on steroids:  Take additional admelog before meals based on sugar if elevated:  2 Unit(s) if Glucose 151 - 200  4 Unit(s) if Glucose 201 - 250  6 Unit(s) if Glucose 251 - 300  8 Unit(s) if Glucose 301 - 350  10 Unit(s) if Glucose 351 - 400  12 Unit(s) if Glucose GREATER THAN 400, call your doctor    DISCHARGE PLANNING:  - Make sure patient knows how to inject insulin and check fingersticks with glucometer  - At home, Patient should check FSBG premeals and bedtime. Pt should call their doctor when FSBG <70 or above >400 and or consistently above 200s as changes in the regimen will have to be made.  -Advised that pt should call PMD for DM related questions or concerns until pt is seen by CDE or Endocrine team.    DISCHARGE RX:  - Glucometer (ACCU_CHECK pratibha Connect, Ascensia Contour Next EZ or One, Freestyle [place] LITE or OneTouch Verio IQ)  - Glucometer test strips and lancets  (make sure compatible with glucometer): Dispense #100 (or #200), use as directed (3 refills)  - BD kenji 4mm pen needles: dispense #100, use as directed (3 refills)  - Alcohol pads: dispense #100, use as directed (3 refills)    #Hypertension  - BP goal <130/80  - Management as per primary team    #Hyperlipidemia  - check fasting lipid panel as outpatient    Antonietta Rojas MD  Attending Physician   Department of Endocrinology, Diabetes and Metabolism   [employer] Teams on 11-15-23 @ 16:38    If before 9AM or after 5PM, or on weekends/holidays, please call the Endocrine answering service for assistance ([phone]).  For nonurgent matters, please email [email or web address] for assistance.

## 2023-11-15 NOTE — PROVIDER CONTACT NOTE (OTHER) - ACTION/TREATMENT ORDERED:
Continue to monitor
no new orders at this time- monitor
Continue to provide diabetes education as needed.

## 2023-11-15 NOTE — MEDICAL STUDENT PROGRESS NOTE(EDUCATION) - SUBJECTIVE AND OBJECTIVE BOX
**************************************  Loreta Hunt  MS3   **************************************    INTERVAL HPI/OVERNIGHT EVENTS:  Patient was seen and examined at bedside. As per nurse and patient, no o/n events, patient resting comfortably. No complaints at this time. Patient denies: fever, chills, dizziness, weakness, HA, Changes in vision, CP, palpitations, SOB, cough, N/V/D/C, dysuria, changes in bowel movements, LE edema. ROS otherwise negative. Was transitioned from BIPAP to nasal canula yesterday and has been taken off the canula since last night at 11 pm. He reports that his breathing and coughing has much improved.     VITAL SIGNS:  T(F): 98 (11-14-23 @ 10:00)  HR: 98 (11-14-23 @ 10:00)  BP: 119/78 (11-14-23 @ 10:00)  RR: 17 (11-14-23 @ 10:00)  SpO2: 98% (11-14-23 @ 10:00)  Wt(kg): --    PHYSICAL EXAM:    Constitutional: WDWN, NAD  Respiratory: CTA b/l, good air entry, expiratory wheezes heard on auscultation, no rhonchi, no rales, without accessory muscle use and no intercostal retractions  Extremities: Warm, well perfused, no edema, no clubbing.   Neurological: AAOx3  Skin: Normal temperature, warm, dry    MEDICATIONS  (STANDING):  albuterol/ipratropium for Nebulization 3 milliLiter(s) Nebulizer every 6 hours  apixaban 5 milliGRAM(s) Oral every 12 hours  azithromycin  IVPB 500 milliGRAM(s) IV Intermittent every 24 hours  azithromycin  IVPB      budesonide 160 MICROgram(s)/formoterol 4.5 MICROgram(s) Inhaler 2 Puff(s) Inhalation two times a day  dextrose 5%. 1000 milliLiter(s) (50 mL/Hr) IV Continuous <Continuous>  dextrose 5%. 1000 milliLiter(s) (100 mL/Hr) IV Continuous <Continuous>  dextrose 50% Injectable 25 Gram(s) IV Push once  dextrose 50% Injectable 12.5 Gram(s) IV Push once  dextrose 50% Injectable 25 Gram(s) IV Push once  glucagon  Injectable 1 milliGRAM(s) IntraMuscular once  insulin glargine Injectable (LANTUS) 20 Unit(s) SubCutaneous at bedtime  insulin lispro (ADMELOG) corrective regimen sliding scale   SubCutaneous at bedtime  insulin lispro (ADMELOG) corrective regimen sliding scale   SubCutaneous three times a day before meals  insulin lispro Injectable (ADMELOG) 5 Unit(s) SubCutaneous three times a day before meals  metoclopramide 10 milliGRAM(s) Oral at bedtime  metoprolol succinate ER 50 milliGRAM(s) Oral daily  montelukast 10 milliGRAM(s) Oral daily  potassium chloride    Tablet ER 20 milliEquivalent(s) Oral once  tamsulosin 0.4 milliGRAM(s) Oral at bedtime    MEDICATIONS  (PRN):  dextrose Oral Gel 15 Gram(s) Oral once PRN Blood Glucose LESS THAN 70 milliGRAM(s)/deciliter      Allergies    No Known Allergies    Intolerances        LABS:                        11.9   16.91 )-----------( 276      ( 14 Nov 2023 07:40 )             34.2     11-14    137  |  99  |  48<H>  ----------------------------<  312<H>  3.8   |  24  |  1.40<H>    Ca    9.0      14 Nov 2023 07:40  Phos  4.8     11-14  Mg     2.40     11-14    TPro  7.4  /  Alb  3.5  /  TBili  0.3  /  DBili  x   /  AST  14  /  ALT  10  /  AlkPhos  99  11-14    BUN = 48   creatinine = 1.40  eGFR = 54  pH = 7.5  pCO2 = 33  pO2 = 80       Urinalysis Basic - ( 14 Nov 2023 07:40 )    Color: x / Appearance: x / SG: x / pH: x  Gluc: 312 mg/dL / Ketone: x  / Bili: x / Urobili: x   Blood: x / Protein: x / Nitrite: x   Leuk Esterase: x / RBC: x / WBC x   Sq Epi: x / Non Sq Epi: x / Bacteria: x        RADIOLOGY & ADDITIONAL TESTS:  Reviewed
no
**************************************  Loreta Hunt  MS3   **************************************    INTERVAL HPI/OVERNIGHT EVENTS:  Patient was seen and examined at bedside. As per nurse and patient, no o/n events, patient resting comfortably. No complaints at this time. Patient denies: fever, chills, dizziness, weakness, HA, Changes in vision, CP, palpitations, SOB, cough, N/V/D/C, dysuria, changes in bowel movements, LE edema. ROS otherwise negative. Was transitioned from BIPAP to nasal canula on Monday and has been taken off the canula since Monday night at 11 pm. He has been on room air since then and reports no issues with breathing or coughing. He was continuously monitored on telemetry. He was given his final dose of IV methylprednisolone on 11/14 and transitioned to PO for all subsequent days.     His echocardiogram from yesterday showed a moderately reduced ejection fraction of 40 - 45% which was decreased from his previous LVEF of 55% in January.    VITAL SIGNS:  T(F): 97.8 (11-15-23 @ 05:36)  HR: 88 (11-15-23 @ 05:36)  BP: 119/76 (11-15-23 @ 05:36)  RR: 18 (11-15-23 @ 05:36)  SpO2: 99% (11-15-23 @ 05:36)  Wt(kg): --    PHYSICAL EXAM:    Constitutional: WDWN, NAD  Respiratory: CTA b/l, good air entry b/l, expiratory wheezing, no rhonchi, no rales, without accessory muscle use and no intercostal retractions  Extremities: Warm, well perfused, no edema, no clubbing.  Neurological: AAOx3  Skin: Normal temperature, warm, dry    MEDICATIONS  (STANDING):  albuterol/ipratropium for Nebulization 3 milliLiter(s) Nebulizer every 6 hours  apixaban 5 milliGRAM(s) Oral every 12 hours  budesonide 160 MICROgram(s)/formoterol 4.5 MICROgram(s) Inhaler 2 Puff(s) Inhalation two times a day  dextrose 5%. 1000 milliLiter(s) (50 mL/Hr) IV Continuous <Continuous>  dextrose 5%. 1000 milliLiter(s) (100 mL/Hr) IV Continuous <Continuous>  dextrose 50% Injectable 12.5 Gram(s) IV Push once  dextrose 50% Injectable 25 Gram(s) IV Push once  dextrose 50% Injectable 25 Gram(s) IV Push once  glucagon  Injectable 1 milliGRAM(s) IntraMuscular once  insulin glargine Injectable (LANTUS) 20 Unit(s) SubCutaneous at bedtime  insulin lispro (ADMELOG) corrective regimen sliding scale   SubCutaneous three times a day before meals  insulin lispro (ADMELOG) corrective regimen sliding scale   SubCutaneous at bedtime  insulin lispro Injectable (ADMELOG) 8 Unit(s) SubCutaneous three times a day before meals  metoclopramide 10 milliGRAM(s) Oral at bedtime  metoprolol succinate ER 50 milliGRAM(s) Oral daily  montelukast 10 milliGRAM(s) Oral daily  predniSONE   Tablet   Oral   tamsulosin 0.4 milliGRAM(s) Oral at bedtime    MEDICATIONS  (PRN):  dextrose Oral Gel 15 Gram(s) Oral once PRN Blood Glucose LESS THAN 70 milliGRAM(s)/deciliter      Allergies    No Known Allergies    Intolerances        LABS:                        12.4   15.99 )-----------( 292      ( 15 Nov 2023 07:34 )             37.8     11-14    137  |  99  |  48<H>  ----------------------------<  312<H>  3.8   |  24  |  1.40<H>    Ca    9.0      14 Nov 2023 07:40  Phos  4.8     11-14  Mg     2.40     11-14    TPro  7.4  /  Alb  3.5  /  TBili  0.3  /  DBili  x   /  AST  14  /  ALT  10  /  AlkPhos  99  11-14      Urinalysis Basic - ( 14 Nov 2023 07:40 )    Color: x / Appearance: x / SG: x / pH: x  Gluc: 312 mg/dL / Ketone: x  / Bili: x / Urobili: x   Blood: x / Protein: x / Nitrite: x   Leuk Esterase: x / RBC: x / WBC x   Sq Epi: x / Non Sq Epi: x / Bacteria: x        RADIOLOGY & ADDITIONAL TESTS:  Reviewed

## 2023-11-15 NOTE — DIETITIAN INITIAL EVALUATION ADULT - PROBLEM SELECTOR PLAN 1
With respiratory acidosis 2.2 CO2 retention  Correlates to COPD exacerbation    c/w BIPAP for ventilation effects - trend VBGs, check in AM  Pulm consult in AM  C/w methyl-prednisonolone 40mg IV q8hrs, taper per pulm  c/w azithromycin  c/w symbicort + Duonebs + singulair  Duonebs q6hr

## 2023-11-15 NOTE — DIETITIAN INITIAL EVALUATION ADULT - PERTINENT LABORATORY DATA
11-15    141  |  103  |  35<H>  ----------------------------<  333<H>  3.6   |  25  |  1.24    Ca    8.8      15 Nov 2023 11:39  Phos  4.2     11-15  Mg     2.20     11-15    TPro  7.5  /  Alb  3.9  /  TBili  0.4  /  DBili  x   /  AST  14  /  ALT  15  /  AlkPhos  98  11-15  POCT Blood Glucose.: 470 mg/dL (11-15-23 @ 12:45)  A1C with Estimated Average Glucose Result: 8.7 % (11-12-23 @ 16:20)  A1C with Estimated Average Glucose Result: 8.4 % (01-07-23 @ 06:31)

## 2023-11-15 NOTE — PROGRESS NOTE ADULT - ASSESSMENT
68M Japanese-speaking w/ COPD/Asthma overlap current smoker with >20pack years, T2DM, gastroparesis, HTN, CAD s/p 1 stent, Afib presenting with coughx4 days and SOB x2days. with worsen CHEW. nephrology consulted for CHRISTA    CHRISTA  baseline ~ 1.1  now with worsen sec  given lasix 40x1  on losartan 100 daily  uncontrolled hyperglycemia on steroid for copd  CHRISTA likely prerenal   hold losartan  ordered for LR @ 50cc/hr x5 hrs by team  renal function better today. continue to monitor. can resume arb once glucose and scr better  monitor  avoid nephrotoxic agents    sob  likely asthma/copd  f/u pulm  on steroid

## 2023-11-15 NOTE — PROGRESS NOTE ADULT - PROBLEM SELECTOR PLAN 6
A1C 8.7%  On discharge previously he required lantus 40 admelog 28 for steroid induced hyperglycemia and just recently had been taken off of insulin (long acting one month ago and short acting 6 months ago due to hypoglycemia)    Transitioned to NC, advanced diet to carb consistent diet   Lantus 20u   Lispro 5u before meals   ISS q6hr  FSq6hr A1C 8.7%  On discharge previously he required lantus 40 admelog 28 for steroid induced hyperglycemia and just recently had been taken off of insulin (long acting one month ago and short acting 6 months ago due to hypoglycemia)    Transitioned to NC, advanced diet to carb consistent diet   Lantus 20u   Lispro 5u before meals   ISS q6hr  FSq6hr    Endo consult for home insulin recommendations

## 2023-11-15 NOTE — DIETITIAN INITIAL EVALUATION ADULT - OTHER INFO
68 year old male with a PMH of COPD/Asthma, T2DM, HTN, CAD presenting with coughx4 days and SOB x 2 days concerning for asthma exacerbation vs new CHF per chart.    Patient w/ no appetite issues. No GI distress noted. On Reglan given PMH of gastroparesis per chart. Has no food allergies. Unable to obtain UBW at this time. Per previous RD note (1/12), noted w/ weight 70 kg and height 65 in. ABW is 65.8 kg (11/13) per chart indicating a -6% weight loss x 10 months, not significant. No edema or pressure injuries per RN flow sheet.    Noted patient DM education previously per RD note (1/12).

## 2023-11-15 NOTE — MEDICAL STUDENT PROGRESS NOTE(EDUCATION) - PLAN 2
- c/w therapy as above   - steroids, inhalers, duonebs - c/w therapy as above   - steroids, inhalers

## 2023-11-15 NOTE — MEDICAL STUDENT PROGRESS NOTE(EDUCATION) - PLAN 9
Eliquis 5mg BID  Denies prior CHF so CHADSVASC of 3  Given suspicion high for CHF --> CHADSVASC 4
Eliquis 5mg BID  Denies prior CHF so CHADSVASC of 3  Given suspicion high for CHF --> CHADSVASC 4.

## 2023-11-15 NOTE — PROGRESS NOTE ADULT - PROBLEM SELECTOR PLAN 7
-resume metoprolol  -c/w home losartan -resume metoprolol  -c/w home losartan after creatinine levels resolve

## 2023-11-15 NOTE — MEDICAL STUDENT PROGRESS NOTE(EDUCATION) - PLAN 8
Patient should be aspirin 81 and eliquis  c/w High intensity statin
Resume ASA, statin, BB, eliquis  patient to have f/u with cards.

## 2023-11-15 NOTE — MEDICAL STUDENT PROGRESS NOTE(EDUCATION) - PLAN 4
Unclear cause of hematuria, given patient is a smoker he is at risk of bladder cancer - RESOLVED    Repeat UA to confirm blood as initial was without RBCs  CPK Negative.
Unclear cause of hematuria, given patient is a smoker he is at risk of bladder cancer    Repeat UA to confirm blood as initial was without RBCs  CPK Negative.

## 2023-11-15 NOTE — PROVIDER CONTACT NOTE (HYPOGLYCEMIA EVENT) - NS PROVIDER CONTACT BACKGROUND-HYPO
Age: 68y    Gender: Male    POCT Blood Glucose:  100 mg/dL (11-15-23 @ 21:54)  71 mg/dL (11-15-23 @ 21:24)  67 mg/dL (11-15-23 @ 21:22)  317 mg/dL (11-15-23 @ 17:14)  470 mg/dL (11-15-23 @ 12:45)  480 mg/dL (11-15-23 @ 12:43)  215 mg/dL (11-15-23 @ 08:29)  325 mg/dL (11-14-23 @ 22:43)      eMAR:  insulin glargine Injectable (LANTUS)   20 Unit(s) SubCutaneous (11-14-23 @ 22:50)    insulin lispro (ADMELOG) corrective regimen sliding scale   4 Unit(s) SubCutaneous (11-14-23 @ 22:49)    insulin lispro (ADMELOG) corrective regimen sliding scale   8 Unit(s) SubCutaneous (11-15-23 @ 17:39)   12 Unit(s) SubCutaneous (11-15-23 @ 13:14)   4 Unit(s) SubCutaneous (11-15-23 @ 09:05)    insulin lispro Injectable (ADMELOG)   18 Unit(s) SubCutaneous (11-15-23 @ 17:39)    insulin lispro Injectable (ADMELOG)   8 Unit(s) SubCutaneous (11-15-23 @ 13:14)   8 Unit(s) SubCutaneous (11-15-23 @ 09:08)    predniSONE   Tablet   40 milliGRAM(s) Oral (11-15-23 @ 06:32)

## 2023-11-15 NOTE — DIETITIAN INITIAL EVALUATION ADULT - PERTINENT MEDS FT
MEDICATIONS  (STANDING):  apixaban 5 milliGRAM(s) Oral every 12 hours  budesonide 160 MICROgram(s)/formoterol 4.5 MICROgram(s) Inhaler 2 Puff(s) Inhalation two times a day  dextrose 5%. 1000 milliLiter(s) (100 mL/Hr) IV Continuous <Continuous>  dextrose 5%. 1000 milliLiter(s) (50 mL/Hr) IV Continuous <Continuous>  dextrose 50% Injectable 12.5 Gram(s) IV Push once  dextrose 50% Injectable 25 Gram(s) IV Push once  dextrose 50% Injectable 25 Gram(s) IV Push once  glucagon  Injectable 1 milliGRAM(s) IntraMuscular once  insulin glargine Injectable (LANTUS) 20 Unit(s) SubCutaneous at bedtime  insulin lispro (ADMELOG) corrective regimen sliding scale   SubCutaneous three times a day before meals  insulin lispro (ADMELOG) corrective regimen sliding scale   SubCutaneous at bedtime  insulin lispro Injectable (ADMELOG) 8 Unit(s) SubCutaneous three times a day before meals  metoclopramide 10 milliGRAM(s) Oral at bedtime  metoprolol succinate ER 50 milliGRAM(s) Oral daily  montelukast 10 milliGRAM(s) Oral daily  tamsulosin 0.4 milliGRAM(s) Oral at bedtime    MEDICATIONS  (PRN):  dextrose Oral Gel 15 Gram(s) Oral once PRN Blood Glucose LESS THAN 70 milliGRAM(s)/deciliter

## 2023-11-15 NOTE — DIETITIAN INITIAL EVALUATION ADULT - PROBLEM SELECTOR PLAN 8
Asa 81 not part of home medications    Patient should be aspirin 81 and eliquis  c/w High intensity statin  Patient should be on ASA per last cardiology note in January, patient to have f/u with cards

## 2023-11-15 NOTE — PROGRESS NOTE ADULT - PROBLEM SELECTOR PLAN 1
With respiratory acidosis 2.2 CO2 retention- now resolved   Correlates to COPD exacerbation    - transition to room air- trend VBGs, check daily, start on nasal canula if needed  - O2 sat goal 90%-95%  Pulm notified patient is here- agree with course of action and can formally consult if needed    methyl-prednisonolone 40mg IV q8hrs d/c'd- now transitioned to PO taper for 5 days   - last dose of azithromycin 11/14  - c/w symbicort + Duonebs + singulair  - Duonebs q6hr

## 2023-11-15 NOTE — MEDICAL STUDENT PROGRESS NOTE(EDUCATION) - PLAN 1
With respiratory acidosis 2.2 CO2 retention which correlates to COPD exacerbation - RESOLVED    - has been transitioned to room air on 11/13  - has been transitioned from IV methylprednisolone to PO taper for 5 days on 11/14  - last dose of azithromycin on 11/14  - c/w symbicort + Duonebs + singulair  - Duonebs q6hr. With respiratory acidosis 2.2 CO2 retention which correlates to COPD exacerbation - RESOLVED    - has been transitioned to room air on 11/13  - has been transitioned from IV methylprednisolone to PO taper on 11/14  - last dose of azithromycin on 11/14  - c/w symbicort + singulair

## 2023-11-15 NOTE — CONSULT NOTE ADULT - SUBJECTIVE AND OBJECTIVE BOX
HPI:  68M Occitan-speaking w/ COPD/Asthma overlap current smoker with >20pack years, T2DM, gastroparesis, HTN, CAD s/p 1 stent, Afib presenting with coughx4 days and SOB x2days. Patient is A/Ox4 though defers to son for history. Patient started to have a dry, non-productive cough, 4 days ago. It felt like a typical worsening of his asthma and he had to use his rescue inhaler ~4 times. Despite this he started to develop a SOB with exertion. He is usually able to walk 1-2 flights of stairs without becoming winded however now he cannot walk up a flight of stairs. Due to this, the son called for EMS to evaluate the patient. While on the ride with EMS he started to become even more short of breath requiring a mask (CPAP) to support his breathing further. Patient himself states he feels like this is asthma and continues to endorse SOB. Denies CP past or present, denies abdominal pain/dysuria. Denies recent travel and sick contacts. States that it had significantly worsened today. Denies fever, chills, sore throat, congestion, N/V, abdominal pain, edema, PND/orthopnea. He reports no nocturnal awakenings either from asthma or any other cause. Patient is on HCTZ 25mg qd but no longer on lasix. Also no longer on insulin - long acting stopped one month ago and short acting stopped 6 months ago and has had low FSGs to 50-70s in the evening previously.     In ED, afebrile, tachycardic, hypertensive, required BIPAP for increased WOB. Received 2 nebs, 2g mag, 12 of decadron by EMS. In ED received tylenol, lasix 40, azithromycin, solu-medrol 40, Zosyn. ED noted B-lines on lung POCUS.     Pulm: [facility]   Cards: Simran Maylin   (13 Nov 2023 00:01)      Endocrine consult  HPI:  67yo M with uncontrolled DM2, asthma, HTN, CAD, afib presenting with SOB, COPD exacerbation, consulted for DM and steroid induced hyperglycemia. Endocrine team consulted for uncontrolled diabetes.     [place]  ID [id number]   Patient was diagnosed with T2DM 5 years ago. Son helps with meds. No complications. Follows with PCP.  Pt denies poor appetite.  Denies family history of DM.  Steroid taper listed below.    Further history obtained from son by phone:  After 1/2023 when diabetes team discharged him on insulin with steroid taper, pt continued on insulin until about 6 months ago. This was weaned off due to lower sugars and pt was maintained on metformin twice daily and glyburide 5 mg. Pt was having hypoglycemia so glyburide was stopped 1 month ago. The patient has only been taking metformin twice daily since then.    Steroid taper:  s/p methylpred 40 mg on 11/14  s/p prednisone 40 mg on 11/15 today  11/16 prednisone 40 mg  11/17-18 prednisone 30 mg  11/19-20 prednisone 20 mg  11/21-23 prednisone 10 mg      Endocrinologist: none    Last HbA1c: 8.7%    Home DM regimen:  metformin 1000 mg bid      PAST MEDICAL & SURGICAL HISTORY:  Type 2 diabetes mellitus      CAD (coronary artery disease)      Asthma      Essential hypertension      No significant past surgical history          FAMILY HISTORY:  No pertinent family history in first degree relatives  DM in family        Social History:  no tobacco, etoh or drug use    MEDICATIONS  (STANDING):  apixaban 5 milliGRAM(s) Oral every 12 hours  budesonide 160 MICROgram(s)/formoterol 4.5 MICROgram(s) Inhaler 2 Puff(s) Inhalation two times a day  dextrose 5%. 1000 milliLiter(s) (100 mL/Hr) IV Continuous <Continuous>  dextrose 5%. 1000 milliLiter(s) (50 mL/Hr) IV Continuous <Continuous>  dextrose 50% Injectable 12.5 Gram(s) IV Push once  dextrose 50% Injectable 25 Gram(s) IV Push once  dextrose 50% Injectable 25 Gram(s) IV Push once  glucagon  Injectable 1 milliGRAM(s) IntraMuscular once  insulin glargine Injectable (LANTUS) 25 Unit(s) SubCutaneous at bedtime  insulin lispro (ADMELOG) corrective regimen sliding scale   SubCutaneous three times a day before meals  insulin lispro (ADMELOG) corrective regimen sliding scale   SubCutaneous at bedtime  insulin lispro Injectable (ADMELOG) 10 Unit(s) SubCutaneous three times a day before meals  metoclopramide 10 milliGRAM(s) Oral at bedtime  metoprolol tartrate 25 milliGRAM(s) Oral once  montelukast 10 milliGRAM(s) Oral daily  tamsulosin 0.4 milliGRAM(s) Oral at bedtime    MEDICATIONS  (PRN):  dextrose Oral Gel 15 Gram(s) Oral once PRN Blood Glucose LESS THAN 70 milliGRAM(s)/deciliter      Allergies    No Known Allergies    Intolerances        Review of Systems:  Constitutional: No fever, good appetite/po intake  Eyes: No blurry vision, diplopia  Neuro: No tremors  HEENT: No pain  Cardiovascular: No chest pain, palpitations  Respiratory: No SOB, no cough  GI: No nausea, vomiting,   : No dysuria, hematuria  Skin: no rash  Psych: no depression  Endocrine: no polyuria, polydipsia  Hem/lymph: no swelling  Osteoporosis: no fractures    ALL OTHER SYSTEMS REVIEWED AND NEGATIVE    PHYSICAL EXAM:  VITALS: T(C): 37 (11-15-23 @ 12:37)  T(F): 98.6 (11-15-23 @ 12:37), Max: 98.6 (11-15-23 @ 12:37)  HR: 111 (11-15-23 @ 12:37) (88 - 111)  BP: 199/81 (11-15-23 @ 12:37) (116/55 - 199/81)  RR:  (18 - 20)  SpO2:  (93% - 99%)  Wt(kg): --  GENERAL: NAD, well-groomed, well-developed  EYES: No proptosis, extraocular movements intact,  no lid lag, anicteric  HEENT:  Atraumatic, Normocephalic, dry mucous membranes  THYROID: Normal size, no thyromegaly  RESPIRATORY: on RA, +rhonchi  CARDIOVASCULAR:No murmurs; no peripheral edema  GI: Soft, nontender, non distended, normal bowel sounds  SKIN: Dry, intact, No rashes or lesions  NEURO: sensation intact, no tremors  PSYCH: reactive affect, euthymic mood  CUSHING'S SIGNS: no striae or visible bruising                              12.4   15.99 )-----------( 292      ( 15 Nov 2023 07:34 )             37.8       11-15    141  |  103  |  35<H>  ----------------------------<  333<H>  3.6   |  25  |  1.24    eGFR: 63    Ca    8.8      11-15  Mg     2.20     11-15  Phos  4.2     11-15    TPro  7.5  /  Alb  3.9  /  TBili  0.4  /  DBili  x   /  AST  14  /  ALT  15  /  AlkPhos  98  11-15            
[facility]   MD FE COUGHLIN MD ANGELA WONG, PA        TEL:  OFFICE: [phone]  From 5pm-7am answering service [phone]    --- INITIAL RENAL CONSULT NOTE ---date of service 11-14-23 @ 09:23    HPI:  68M Yi-speaking w/ COPD/Asthma overlap current smoker with >20pack years, T2DM, gastroparesis, HTN, CAD s/p 1 stent, Afib presenting with coughx4 days and SOB x2days. with worsen CHEW. While on the ride with EMS he started to become even more short of breath requiring a mask (CPAP) to support his breathing further. Patient himself states he feels like this is asthma and continues to endorse SOB. Denies CP past or present, denies abdominal pain/dysuria. Denies recent travel and sick contacts. States that it had significantly worsened today. Denies fever, chills, sore throat, congestion, N/V, abdominal pain, edema, PND/orthopnea. He reports no nocturnal awakenings either from asthma or any other cause. Patient is on HCTZ 25mg qd but no longer on lasix. Also no longer on insulin - long acting stopped one month ago and short acting stopped 6 months ago and has had low FSGs to 50-70s in the evening previously.   pt follows up with Dr. Arthur for ckd       Allergies:  No Known Allergies      PAST MEDICAL & SURGICAL HISTORY:  Type 2 diabetes mellitus      CAD (coronary artery disease)      Asthma      Essential hypertension      No significant past surgical history          Home Medications Reviewed    Hospital Medications:   MEDICATIONS  (STANDING):  albuterol/ipratropium for Nebulization 3 milliLiter(s) Nebulizer every 6 hours  apixaban 5 milliGRAM(s) Oral every 12 hours  azithromycin  IVPB 500 milliGRAM(s) IV Intermittent every 24 hours  azithromycin  IVPB      budesonide 160 MICROgram(s)/formoterol 4.5 MICROgram(s) Inhaler 2 Puff(s) Inhalation two times a day  dextrose 5%. 1000 milliLiter(s) (100 mL/Hr) IV Continuous <Continuous>  dextrose 5%. 1000 milliLiter(s) (50 mL/Hr) IV Continuous <Continuous>  dextrose 50% Injectable 12.5 Gram(s) IV Push once  dextrose 50% Injectable 25 Gram(s) IV Push once  dextrose 50% Injectable 25 Gram(s) IV Push once  glucagon  Injectable 1 milliGRAM(s) IntraMuscular once  insulin glargine Injectable (LANTUS) 20 Unit(s) SubCutaneous at bedtime  insulin lispro (ADMELOG) corrective regimen sliding scale   SubCutaneous three times a day before meals  insulin lispro (ADMELOG) corrective regimen sliding scale   SubCutaneous at bedtime  insulin lispro Injectable (ADMELOG) 5 Unit(s) SubCutaneous three times a day before meals  lactated ringers. 500 milliLiter(s) (100 mL/Hr) IV Continuous <Continuous>  losartan 100 milliGRAM(s) Oral daily  methylPREDNISolone sodium succinate Injectable 40 milliGRAM(s) IV Push every 8 hours  metoclopramide 10 milliGRAM(s) Oral at bedtime  metoprolol succinate ER 50 milliGRAM(s) Oral daily  montelukast 10 milliGRAM(s) Oral daily  potassium chloride    Tablet ER 20 milliEquivalent(s) Oral once  tamsulosin 0.4 milliGRAM(s) Oral at bedtime      SOCIAL HISTORY:  current Smoking,     FAMILY HISTORY:  No pertinent family history in first degree relatives        REVIEW OF SYSTEMS:  CONSTITUTIONAL: No weakness, fevers or chills  EYES/ENT: No visual changes;  No vertigo or throat pain   NECK: No pain or stiffness  RESPIRATORY: see hpi  CARDIOVASCULAR: No chest pain or palpitations.  GASTROINTESTINAL: No abdominal or epigastric pain. No nausea, vomiting, or hematemesis; No diarrhea or constipation. No melena or hematochezia.  GENITOURINARY: No dysuria, frequency, foamy urine, urinary urgency, incontinence or hematuria  NEUROLOGICAL: No numbness or weakness  SKIN: No itching, burning, rashes, or lesions   VASCULAR: No bilateral lower extremity edema.   All other review of systems is negative unless indicated above.    VITALS:  T(F): 97.8 (11-14-23 @ 05:52), Max: 98.5 (11-13-23 @ 22:32)  HR: 94 (11-14-23 @ 05:52)  BP: 109/70 (11-14-23 @ 05:52)  RR: 18 (11-14-23 @ 05:52)  SpO2: 96% (11-14-23 @ 05:52)  Wt(kg): --    11-13 @ 07:01  -  11-14 @ 07:00  --------------------------------------------------------  IN: 800 mL / OUT: 600 mL / NET: 200 mL          PHYSICAL EXAM:  General: NAD  HEENT: anicteric sclera, oropharynx clear, MMM  Neck: No JVD  Respiratory: wheeze  Cardiovascular: S1, S2, RRR  Gastrointestinal: BS+, soft, NT/ND  Extremities: No cyanosis or clubbing. No peripheral edema  Neurological: A/O x 3, no focal deficits  Psychiatric: Normal mood, normal affect  : No CVA tenderness. No mena.   Skin: No rashes  Vascular Access: none    LABS:  11-14    137  |  99  |  48<H>  ----------------------------<  312<H>  3.8   |  24  |  1.40<H>    Ca    9.0      14 Nov 2023 07:40  Phos  4.8     11-14  Mg     2.40     11-14    TPro  7.4  /  Alb  3.5  /  TBili  0.3  /  DBili      /  AST  14  /  ALT  10  /  AlkPhos  99  11-14    Creatinine Trend: 1.40 <--, 1.27 <--, 1.24 <--                        11.9   16.91 )-----------( 276      ( 14 Nov 2023 07:40 )             34.2     Urine Studies:  Urinalysis Basic - ( 14 Nov 2023 07:40 )    Color:  / Appearance:  / SG:  / pH:   Gluc: 312 mg/dL / Ketone:   / Bili:  / Urobili:    Blood:  / Protein:  / Nitrite:    Leuk Esterase:  / RBC:  / WBC    Sq Epi:  / Non Sq Epi:  / Bacteria:           RADIOLOGY & ADDITIONAL STUDIES:

## 2023-11-15 NOTE — MEDICAL STUDENT PROGRESS NOTE(EDUCATION) - PLAN 3
CXR w/o focal consolidations however appears more vol ol especially at bilateral bases compared to prior cxr    TTE - DONE on 11/14 - showed reduced EF and plan is to follow up with cardio outpatient  c/w metoprolol ER 50mg qd  d/c home Losartan as per nephro recs.
CXR w/o focal consolidations however appears more vol ol especially at bilateral bases compared to prior cxr    TTE  c/w metoprolol ER 50mg qd  d/c home Losartan as per nephro recs.

## 2023-11-15 NOTE — PROGRESS NOTE ADULT - PROBLEM SELECTOR PLAN 12
Activity: ambulate as tolerated   Bowel reg: miralax   Consultants: none    Diet: consistent carb diet  DVT ppx: AC  Dispo: pending clinical improvement  Directive: Full code

## 2023-11-15 NOTE — MEDICAL STUDENT PROGRESS NOTE(EDUCATION) - PLAN 5
Lactic acidosis likely iso of albuterol use  Low index of suspicion for low flow/Type A state
Lactic acidosis likely iso of albuterol use  Low index of suspicion for low flow/Type A state.

## 2023-11-15 NOTE — MEDICAL STUDENT PROGRESS NOTE(EDUCATION) - PLAN 10
Offer nicotine patch  Smoking cessation SBIRT prior to discharge.
Offer nicotine patch  Smoking cessation SBIRT prior to discharge.    Problem 11 - Gastroparesis  Plan: continue with home reglan     Problem 12 - Need for prophylactic measure   Plan: Ambulate as tolerated   Bowel reg: miralax   Consultants: none    Diet: consistent carb diet  DVT ppx: AC  Dispo: pending clinical improvement  Directive: Full code.

## 2023-11-15 NOTE — PROVIDER CONTACT NOTE (HYPOGLYCEMIA EVENT) - NS PROVIDER CONTACT RECOMMEND-HYPO
BS 69 --orange juice @930 PM and home cook food.  Recheck @ 9:56 , Patient is asymptomatic , provider , ROMMEL Gutierrez made aware and stated ok to give 34 units of lantus scheduled for tonight.    BS 71 --orange juice @930 PM and home cook food.  Recheck @ 9:56 , Patient is asymptomatic , provider , ROMMEL Gutierrez made aware and stated will modified insulin

## 2023-11-15 NOTE — DIETITIAN INITIAL EVALUATION ADULT - PROBLEM SELECTOR PLAN 6
Subjective:     Patient ID: Wyatt Pascual is a 19 y o  female  [facility] Clinical Progress Notes      Specialized Services Documentation  Therapist must complete separate progress note for each specific clinical activity in which the individual participated during the day  Case Management Note    [facility]    Current suicide risk : Unable to assess due to absence  Wyatt Pascual contacted the program and stated they were not going to be present due to continued illness  This writer contacted ASHLEY Gonzalez back discussing commitment to program and continued absence  Today was supposed to be Florencia's scheduled 16th day, but she has only been in attendance for 8 days  This will be her second 3-day consecutive absence occurrence since attending program   Due to an inability to adhere to consistent attendance, ASHLEY Gonzalez is being recommended for discharge  She was encouraged to continue with her outpatient providers and contact the program if needed  Medications changes/added/denied? No    Treatment session number: N/A    Individual Case Management Visit provided today? No    [facility] follow up physician's orders: None at this time  A1C 8.7%  On discharge previously he required lantus 40 admelog 28 for steroid induced hyperglycemia and just recently had been taken off of insulin (long acting one month ago and short acting 6 months ago due to hypoglycemia)    On BIPAP so will be NPO  Lantus 10u to start - uptitrate as needed  ISS q6hr  FSq6hr

## 2023-11-15 NOTE — MEDICAL STUDENT PROGRESS NOTE(EDUCATION) - PLAN 6
A1C 8.7%  On discharge previously he required lantus 40 admelog 28 for steroid induced hyperglycemia and just recently had been taken off of insulin (long acting one month ago and short acting 6 months ago due to hypoglycemia)    Transitioned to NC, advanced diet to carb consistent diet   Lantus 20u   Lispro 5u before meals   ISS q6hr  FSq6hr.
A1C 8.7%  On discharge previously he required lantus 40 admelog 28 for steroid induced hyperglycemia and just recently had been taken off of insulin (long acting one month ago and short acting 6 months ago due to hypoglycemia)    Transitioned to NC, advanced diet to carb consistent diet   Lantus 20u   Lispro 5u before meals   ISS q6hr  FSq6hr.

## 2023-11-15 NOTE — PROGRESS NOTE ADULT - ASSESSMENT
68M Pashto-speaking w/ COPD/Asthma overlap current smoker with >20pack years, T2DM, HTN, CAD s/p 1 stent, Afib presenting with coughx4 days and SOB x2days concerning for asthma exacerbation vs new CHF. Admitted to medicine for further evaluation.

## 2023-11-15 NOTE — PROGRESS NOTE ADULT - PROBLEM SELECTOR PLAN 3
CXR w/o focal consolidations however appears more vol ol especially at bilateral bases compared to prior cxr    TTE  c/w metoprolol ER 50mg qd  d/c home Losartan as per nephro recs CXR w/o focal consolidations however appears more vol ol especially at bilateral bases compared to prior cxr    TTE  c/w metoprolol ER 50mg qd  d/c home Losartan as per nephro recs, restart when creatinine back to baseline

## 2023-11-15 NOTE — DIETITIAN INITIAL EVALUATION ADULT - PROBLEM SELECTOR PLAN 7
-resume metoprolol  -hold losartan/HCTZ for now while getting diuresed, resume if BP uptrends. Currently 128/73

## 2023-11-15 NOTE — DIETITIAN INITIAL EVALUATION ADULT - PROBLEM SELECTOR PLAN 4
Unclear cause of hematuria, given patient is a smoker he is at risk of bladder cancer    Repeat UA to confirm blood as initial was without RBCs  CPK Negative

## 2023-11-15 NOTE — PROGRESS NOTE ADULT - SUBJECTIVE AND OBJECTIVE BOX
**************************************  Pati Curiel, PGY1  **************************************    INTERVAL HPI/OVERNIGHT EVENTS:  Patient was seen and examined at bedside. As per nurse and patient, no o/n events, patient resting comfortably. No complaints at this time. Patient denies: fever, chills, dizziness, weakness, HA, Changes in vision, CP, palpitations, SOB, cough, N/V/D/C, dysuria, changes in bowel movements, LE edema. ROS otherwise negative.    VITAL SIGNS:  T(F): 97.8 (11-15-23 @ 05:36)  HR: 88 (11-15-23 @ 05:36)  BP: 119/76 (11-15-23 @ 05:36)  RR: 18 (11-15-23 @ 05:36)  SpO2: 99% (11-15-23 @ 05:36)  Wt(kg): --    PHYSICAL EXAM:  General: WN/WD NAD, on nasal canula   Neurology: A&Ox3, nonfocal, AMARAL x 4  Eyes: PERRLA/ EOMI, Gross vision intact  ENT/Neck: Neck supple, trachea midline, No JVD, Gross hearing intact, No HJR  Respiratory: Wheezing and crackles bilaterally, no accessory muscle use  CV: irregular rhythm, +S1/S2, -S3/S4, no murmurs, rubs or gallops, no LE edema  Abdominal: Soft, NT, ND +BS, No HSM  MSK: 5/5 strength UE/LE bilaterally  Extremities: No edema, 2+ peripheral pulses  Skin: No Rashes, Hematoma, Ecchymosi    MEDICATIONS  (STANDING):  albuterol/ipratropium for Nebulization 3 milliLiter(s) Nebulizer every 6 hours  apixaban 5 milliGRAM(s) Oral every 12 hours  budesonide 160 MICROgram(s)/formoterol 4.5 MICROgram(s) Inhaler 2 Puff(s) Inhalation two times a day  dextrose 5%. 1000 milliLiter(s) (50 mL/Hr) IV Continuous <Continuous>  dextrose 5%. 1000 milliLiter(s) (100 mL/Hr) IV Continuous <Continuous>  dextrose 50% Injectable 25 Gram(s) IV Push once  dextrose 50% Injectable 12.5 Gram(s) IV Push once  dextrose 50% Injectable 25 Gram(s) IV Push once  glucagon  Injectable 1 milliGRAM(s) IntraMuscular once  insulin glargine Injectable (LANTUS) 20 Unit(s) SubCutaneous at bedtime  insulin lispro (ADMELOG) corrective regimen sliding scale   SubCutaneous at bedtime  insulin lispro (ADMELOG) corrective regimen sliding scale   SubCutaneous three times a day before meals  insulin lispro Injectable (ADMELOG) 8 Unit(s) SubCutaneous three times a day before meals  metoclopramide 10 milliGRAM(s) Oral at bedtime  metoprolol succinate ER 50 milliGRAM(s) Oral daily  montelukast 10 milliGRAM(s) Oral daily  predniSONE   Tablet   Oral   tamsulosin 0.4 milliGRAM(s) Oral at bedtime    MEDICATIONS  (PRN):  dextrose Oral Gel 15 Gram(s) Oral once PRN Blood Glucose LESS THAN 70 milliGRAM(s)/deciliter      Allergies    No Known Allergies    Intolerances        LABS:                        12.4   15.99 )-----------( 292      ( 15 Nov 2023 07:34 )             37.8     11-14    137  |  99  |  48<H>  ----------------------------<  312<H>  3.8   |  24  |  1.40<H>    Ca    9.0      14 Nov 2023 07:40  Phos  4.8     11-14  Mg     2.40     11-14    TPro  7.4  /  Alb  3.5  /  TBili  0.3  /  DBili  x   /  AST  14  /  ALT  10  /  AlkPhos  99  11-14      Urinalysis Basic - ( 14 Nov 2023 07:40 )    Color: x / Appearance: x / SG: x / pH: x  Gluc: 312 mg/dL / Ketone: x  / Bili: x / Urobili: x   Blood: x / Protein: x / Nitrite: x   Leuk Esterase: x / RBC: x / WBC x   Sq Epi: x / Non Sq Epi: x / Bacteria: x        RADIOLOGY & ADDITIONAL TESTS:  Reviewed

## 2023-11-15 NOTE — DIETITIAN INITIAL EVALUATION ADULT - PROBLEM SELECTOR PLAN 3
CXR w/o focal consolidations however appears more vol ol especially at bilateral bases compared to prior cxr    TTE  c/w metoprolol ER 50mg qd  Hold HCTZ/Losartan for now. /73. If uptrending resume losartan  IV Lasix 40mg qD (not on at home)

## 2023-11-15 NOTE — PROVIDER CONTACT NOTE (OTHER) - ASSESSMENT
Patient blood glucose 445
A&Ox4. Patient endorsed he had a chocolate milk and other drinks prior to lunch. Provided education to the son who translated to the patient to avoid/limit sugary drinks. Both provided teach back stating they understood.
A&Ox4, no complains. Patient is currently moving around and eating.

## 2023-11-15 NOTE — MEDICAL STUDENT PROGRESS NOTE(EDUCATION) - ASSESSMENT
68M with PMH significant for COPD, asthma, extensive smoking history, T2DM, HTN, CAD and A.fib who presented on Sunday with a 4 day hx of a dry, non-productive cough and 2 day hx of shortness of breath. He was on BIPAP until Monday, after which he was transitioned to nasal canula and then taken off of oxygen on Monday night. His presentation with acute respiratory failure with hypercapnia is consistent with an asthma/COPD exacerbation which has now resolved. Per patient, cough and breathing are much improved. His echocardiogram results from Tuesday showed a moderated reduction in LVEF at 40 - 45%. His previous LVEF in January was 55%.

## 2023-11-15 NOTE — DIETITIAN INITIAL EVALUATION ADULT - HEIGHT FOR BMI (FEET)
[FreeTextEntry1] : 35 mo w cough, RR, Fever\par had the Flu Xmas 2019 x 5 days( seen @ [facility])\par PE TMs not tred\par RR\par PND\par Chest CTA, NO W/R/R\par Viral infection\par Humidifier, FLuids (T&H, C-Soup) antipyretics, Bromfed DM, Asthma meds PRN\par Dental referral\par If symptoms worsen or concerned, call/return to office.\par Questions answered.\par  5

## 2023-11-16 ENCOUNTER — TRANSCRIPTION ENCOUNTER (OUTPATIENT)
Age: 68
End: 2023-11-16

## 2023-11-16 VITALS
TEMPERATURE: 98 F | DIASTOLIC BLOOD PRESSURE: 72 MMHG | HEART RATE: 91 BPM | SYSTOLIC BLOOD PRESSURE: 138 MMHG | RESPIRATION RATE: 18 BRPM | OXYGEN SATURATION: 97 %

## 2023-11-16 LAB
BASE EXCESS BLDV CALC-SCNC: -2.9 MMOL/L — LOW (ref -2–3)
BASE EXCESS BLDV CALC-SCNC: -2.9 MMOL/L — LOW (ref -2–3)
CHLORIDE SERPL-SCNC: 106 MMOL/L — SIGNIFICANT CHANGE UP (ref 98–107)
CHLORIDE SERPL-SCNC: 106 MMOL/L — SIGNIFICANT CHANGE UP (ref 98–107)
CO2 BLDV-SCNC: 23.9 MMOL/L — SIGNIFICANT CHANGE UP (ref 22–26)
CO2 BLDV-SCNC: 23.9 MMOL/L — SIGNIFICANT CHANGE UP (ref 22–26)
CO2 SERPL-SCNC: 20 MMOL/L — LOW (ref 22–31)
CO2 SERPL-SCNC: 20 MMOL/L — LOW (ref 22–31)
GLUCOSE BLDC GLUCOMTR-MCNC: 215 MG/DL — HIGH (ref 70–99)
GLUCOSE BLDC GLUCOMTR-MCNC: 215 MG/DL — HIGH (ref 70–99)
GLUCOSE BLDC GLUCOMTR-MCNC: 340 MG/DL — HIGH (ref 70–99)
GLUCOSE BLDC GLUCOMTR-MCNC: 340 MG/DL — HIGH (ref 70–99)
HCO3 BLDV-SCNC: 23 MMOL/L — SIGNIFICANT CHANGE UP (ref 22–29)
HCO3 BLDV-SCNC: 23 MMOL/L — SIGNIFICANT CHANGE UP (ref 22–29)
HCT VFR BLD CALC: 34.4 % — LOW (ref 39–50)
HCT VFR BLD CALC: 34.4 % — LOW (ref 39–50)
HGB BLD-MCNC: 11.4 G/DL — LOW (ref 13–17)
HGB BLD-MCNC: 11.4 G/DL — LOW (ref 13–17)
MCHC RBC-ENTMCNC: 28.1 PG — SIGNIFICANT CHANGE UP (ref 27–34)
MCHC RBC-ENTMCNC: 28.1 PG — SIGNIFICANT CHANGE UP (ref 27–34)
MCHC RBC-ENTMCNC: 33.1 GM/DL — SIGNIFICANT CHANGE UP (ref 32–36)
MCHC RBC-ENTMCNC: 33.1 GM/DL — SIGNIFICANT CHANGE UP (ref 32–36)
MCV RBC AUTO: 84.9 FL — SIGNIFICANT CHANGE UP (ref 80–100)
MCV RBC AUTO: 84.9 FL — SIGNIFICANT CHANGE UP (ref 80–100)
NRBC # BLD: 0 /100 WBCS — SIGNIFICANT CHANGE UP (ref 0–0)
NRBC # BLD: 0 /100 WBCS — SIGNIFICANT CHANGE UP (ref 0–0)
NRBC # FLD: 0 K/UL — SIGNIFICANT CHANGE UP (ref 0–0)
NRBC # FLD: 0 K/UL — SIGNIFICANT CHANGE UP (ref 0–0)
PCO2 BLDV: 41 MMHG — LOW (ref 42–55)
PCO2 BLDV: 41 MMHG — LOW (ref 42–55)
PH BLDV: 7.35 — SIGNIFICANT CHANGE UP (ref 7.32–7.43)
PH BLDV: 7.35 — SIGNIFICANT CHANGE UP (ref 7.32–7.43)
PLATELET # BLD AUTO: 274 K/UL — SIGNIFICANT CHANGE UP (ref 150–400)
PLATELET # BLD AUTO: 274 K/UL — SIGNIFICANT CHANGE UP (ref 150–400)
PO2 BLDV: 53 MMHG — HIGH (ref 25–45)
PO2 BLDV: 53 MMHG — HIGH (ref 25–45)
POTASSIUM SERPL-MCNC: 4.1 MMOL/L — SIGNIFICANT CHANGE UP (ref 3.5–5.3)
POTASSIUM SERPL-MCNC: 4.1 MMOL/L — SIGNIFICANT CHANGE UP (ref 3.5–5.3)
POTASSIUM SERPL-SCNC: 4.1 MMOL/L — SIGNIFICANT CHANGE UP (ref 3.5–5.3)
POTASSIUM SERPL-SCNC: 4.1 MMOL/L — SIGNIFICANT CHANGE UP (ref 3.5–5.3)
RBC # BLD: 4.05 M/UL — LOW (ref 4.2–5.8)
RBC # BLD: 4.05 M/UL — LOW (ref 4.2–5.8)
RBC # FLD: 17.9 % — HIGH (ref 10.3–14.5)
RBC # FLD: 17.9 % — HIGH (ref 10.3–14.5)
SAO2 % BLDV: 81.4 % — SIGNIFICANT CHANGE UP (ref 67–88)
SAO2 % BLDV: 81.4 % — SIGNIFICANT CHANGE UP (ref 67–88)
SODIUM SERPL-SCNC: 137 MMOL/L — SIGNIFICANT CHANGE UP (ref 135–145)
SODIUM SERPL-SCNC: 137 MMOL/L — SIGNIFICANT CHANGE UP (ref 135–145)
WBC # BLD: 12.27 K/UL — HIGH (ref 3.8–10.5)
WBC # BLD: 12.27 K/UL — HIGH (ref 3.8–10.5)
WBC # FLD AUTO: 12.27 K/UL — HIGH (ref 3.8–10.5)
WBC # FLD AUTO: 12.27 K/UL — HIGH (ref 3.8–10.5)

## 2023-11-16 PROCEDURE — 99239 HOSP IP/OBS DSCHRG MGMT >30: CPT | Mod: GC

## 2023-11-16 PROCEDURE — 99232 SBSQ HOSP IP/OBS MODERATE 35: CPT | Mod: GC

## 2023-11-16 RX ORDER — ISOPROPYL ALCOHOL, BENZOCAINE .7; .06 ML/ML; ML/ML
0 SWAB TOPICAL
Qty: 100 | Refills: 1
Start: 2023-11-16

## 2023-11-16 RX ORDER — INSULIN GLARGINE 100 [IU]/ML
20 INJECTION, SOLUTION SUBCUTANEOUS AT BEDTIME
Refills: 0 | Status: DISCONTINUED | OUTPATIENT
Start: 2023-11-16 | End: 2023-11-16

## 2023-11-16 RX ORDER — LOSARTAN POTASSIUM 100 MG/1
100 TABLET, FILM COATED ORAL DAILY
Refills: 0 | Status: DISCONTINUED | OUTPATIENT
Start: 2023-11-16 | End: 2023-11-16

## 2023-11-16 RX ORDER — METOPROLOL TARTRATE 50 MG
3 TABLET ORAL
Qty: 90 | Refills: 0
Start: 2023-11-16 | End: 2023-12-15

## 2023-11-16 RX ORDER — INSULIN NPH HUM/REG INSULIN HM 70-30/ML
10 VIAL (ML) SUBCUTANEOUS
Qty: 1 | Refills: 0
Start: 2023-11-16 | End: 2023-12-15

## 2023-11-16 RX ORDER — INSULIN LISPRO 100/ML
10 VIAL (ML) SUBCUTANEOUS
Refills: 0 | Status: DISCONTINUED | OUTPATIENT
Start: 2023-11-16 | End: 2023-11-16

## 2023-11-16 RX ADMIN — BUDESONIDE AND FORMOTEROL FUMARATE DIHYDRATE 2 PUFF(S): 160; 4.5 AEROSOL RESPIRATORY (INHALATION) at 09:36

## 2023-11-16 RX ADMIN — APIXABAN 5 MILLIGRAM(S): 2.5 TABLET, FILM COATED ORAL at 17:56

## 2023-11-16 RX ADMIN — Medication 10 UNIT(S): at 13:32

## 2023-11-16 RX ADMIN — Medication 75 MILLIGRAM(S): at 04:52

## 2023-11-16 RX ADMIN — Medication 4: at 13:31

## 2023-11-16 RX ADMIN — Medication 8: at 09:34

## 2023-11-16 RX ADMIN — Medication 18 UNIT(S): at 09:35

## 2023-11-16 RX ADMIN — MONTELUKAST 10 MILLIGRAM(S): 4 TABLET, CHEWABLE ORAL at 13:33

## 2023-11-16 RX ADMIN — Medication 40 MILLIGRAM(S): at 04:51

## 2023-11-16 RX ADMIN — APIXABAN 5 MILLIGRAM(S): 2.5 TABLET, FILM COATED ORAL at 04:51

## 2023-11-16 NOTE — PROGRESS NOTE ADULT - PROBLEM SELECTOR PLAN 3
CXR w/o focal consolidations however appears more vol ol especially at bilateral bases compared to prior cxr    TTE  c/w metoprolol ER 50mg qd  d/c home Losartan as per nephro recs, restart when creatinine back to baseline CXR w/o focal consolidations however appears more vol ol especially at bilateral bases compared to prior cxr    TTE completed wt findings of decreased EF to 40-45% from 55% in previous TTE January 2023- pt to f/u with cardiologist outpatient   c/w metoprolol ER 75mg qd increased from 50mg qd   d/c home Losartan as per nephro recs, restart when creatinine back to baseline

## 2023-11-16 NOTE — PROGRESS NOTE ADULT - SUBJECTIVE AND OBJECTIVE BOX
Admitted for: Asthma with acute exacerbation        Following for: steroid induced hyperglycemia    Subjective/ OVN: episode of hypoglycemia at around 9pm - BG of 67.    MEDICATIONS  (STANDING):  apixaban 5 milliGRAM(s) Oral every 12 hours  budesonide 160 MICROgram(s)/formoterol 4.5 MICROgram(s) Inhaler 2 Puff(s) Inhalation two times a day  dextrose 5%. 1000 milliLiter(s) (50 mL/Hr) IV Continuous <Continuous>  dextrose 5%. 1000 milliLiter(s) (100 mL/Hr) IV Continuous <Continuous>  dextrose 50% Injectable 25 Gram(s) IV Push once  dextrose 50% Injectable 12.5 Gram(s) IV Push once  dextrose 50% Injectable 25 Gram(s) IV Push once  glucagon  Injectable 1 milliGRAM(s) IntraMuscular once  insulin lispro (ADMELOG) corrective regimen sliding scale   SubCutaneous three times a day before meals  insulin lispro (ADMELOG) corrective regimen sliding scale   SubCutaneous at bedtime  insulin lispro Injectable (ADMELOG) 18 Unit(s) SubCutaneous three times a day before meals  metoclopramide 10 milliGRAM(s) Oral at bedtime  metoprolol succinate ER 75 milliGRAM(s) Oral daily  montelukast 10 milliGRAM(s) Oral daily  predniSONE   Tablet   Oral   tamsulosin 0.4 milliGRAM(s) Oral at bedtime    MEDICATIONS  (PRN):  dextrose Oral Gel 15 Gram(s) Oral once PRN Blood Glucose LESS THAN 70 milliGRAM(s)/deciliter      Allergies    No Known Allergies    Intolerances          PHYSICAL EXAM:  VITALS: T(C): 36.7 (11-16-23 @ 08:30)  T(F): 98 (11-16-23 @ 08:30), Max: 98.6 (11-15-23 @ 12:37)  HR: 98 (11-16-23 @ 08:30) (63 - 112)  BP: 131/78 (11-16-23 @ 08:30) (105/74 - 199/81)  RR:  (17 - 19)  SpO2:  (98% - 100%)  Wt(kg): --  GENERAL: NAD  EYES: No proptosis, no lid lag, anicteric  RESPIRATORY: Clear to auscultation bilaterally  CARDIOVASCULAR: Regular rate and rhythm  GI: Soft, nontender, non distended  EXT: b/l feet without wounds, 2+ pulses  PSYCH: Alert and oriented x 3, reactive affect      A1C with Estimated Average Glucose Result: 8.7 % (11-12-23 @ 16:20)  A1C with Estimated Average Glucose Result: 8.4 % (01-07-23 @ 06:31)      POCT Blood Glucose.: 340 mg/dL (11-16-23 @ 09:03)  POCT Blood Glucose.: 181 mg/dL (11-15-23 @ 23:15)  POCT Blood Glucose.: 100 mg/dL (11-15-23 @ 21:54)  POCT Blood Glucose.: 71 mg/dL (11-15-23 @ 21:24)  POCT Blood Glucose.: 67 mg/dL (11-15-23 @ 21:22)  POCT Blood Glucose.: 317 mg/dL (11-15-23 @ 17:14)  POCT Blood Glucose.: 470 mg/dL (11-15-23 @ 12:45)  POCT Blood Glucose.: 480 mg/dL (11-15-23 @ 12:43)  POCT Blood Glucose.: 215 mg/dL (11-15-23 @ 08:29)  POCT Blood Glucose.: 325 mg/dL (11-14-23 @ 22:43)  POCT Blood Glucose.: 243 mg/dL (11-14-23 @ 17:04)  POCT Blood Glucose.: 445 mg/dL (11-14-23 @ 12:53)  POCT Blood Glucose.: 433 mg/dL (11-14-23 @ 12:51)  POCT Blood Glucose.: 269 mg/dL (11-14-23 @ 08:43)  POCT Blood Glucose.: 190 mg/dL (11-13-23 @ 22:26)  POCT Blood Glucose.: 423 mg/dL (11-13-23 @ 17:18)  POCT Blood Glucose.: 260 mg/dL (11-13-23 @ 12:35)      11-15    141  |  103  |  35<H>  ----------------------------<  333<H>  3.6   |  25  |  1.24    eGFR: 63    Ca    8.8      11-15  Mg     2.20     11-15  Phos  4.2     11-15    TPro  7.5  /  Alb  3.9  /  TBili  0.4  /  DBili  x   /  AST  14  /  ALT  15  /  AlkPhos  98  11-15      Thyroid Function Tests:                         Admitted for: Asthma with acute exacerbation        Following for: steroid induced hyperglycemia    Subjective/ OVN: episode of hypoglycemia at around 9pm - BG of 67. Interview conducted with [employer] (Roslyn ) #[id number]. Pt states felt a little dizzy yesterday, but today is not dizzy or lightheaded, had no nausea/vomiting, chest pain, abdominal pain. States has been eating all meals and appetite is not decreased.      MEDICATIONS  (STANDING):  apixaban 5 milliGRAM(s) Oral every 12 hours  budesonide 160 MICROgram(s)/formoterol 4.5 MICROgram(s) Inhaler 2 Puff(s) Inhalation two times a day  dextrose 5%. 1000 milliLiter(s) (50 mL/Hr) IV Continuous <Continuous>  dextrose 5%. 1000 milliLiter(s) (100 mL/Hr) IV Continuous <Continuous>  dextrose 50% Injectable 25 Gram(s) IV Push once  dextrose 50% Injectable 12.5 Gram(s) IV Push once  dextrose 50% Injectable 25 Gram(s) IV Push once  glucagon  Injectable 1 milliGRAM(s) IntraMuscular once  insulin lispro (ADMELOG) corrective regimen sliding scale   SubCutaneous three times a day before meals  insulin lispro (ADMELOG) corrective regimen sliding scale   SubCutaneous at bedtime  insulin lispro Injectable (ADMELOG) 18 Unit(s) SubCutaneous three times a day before meals  metoclopramide 10 milliGRAM(s) Oral at bedtime  metoprolol succinate ER 75 milliGRAM(s) Oral daily  montelukast 10 milliGRAM(s) Oral daily  predniSONE   Tablet   Oral   tamsulosin 0.4 milliGRAM(s) Oral at bedtime    MEDICATIONS  (PRN):  dextrose Oral Gel 15 Gram(s) Oral once PRN Blood Glucose LESS THAN 70 milliGRAM(s)/deciliter      Allergies    No Known Allergies    Intolerances          PHYSICAL EXAM:  VITALS: T(C): 36.7 (11-16-23 @ 08:30)  T(F): 98 (11-16-23 @ 08:30), Max: 98.6 (11-15-23 @ 12:37)  HR: 98 (11-16-23 @ 08:30) (63 - 112)  BP: 131/78 (11-16-23 @ 08:30) (105/74 - 199/81)  RR:  (17 - 19)  SpO2:  (98% - 100%)  Wt(kg): --  GENERAL: NAD  EYES: No proptosis, no lid lag, anicteric  RESPIRATORY: Clear to auscultation bilaterally  CARDIOVASCULAR: Regular rate and rhythm  GI: Soft, nontender, non distended  EXT: b/l feet without wounds, no pitting edema  PSYCH: Alert and oriented x 3, reactive affect      A1C with Estimated Average Glucose Result: 8.7 % (11-12-23 @ 16:20)  A1C with Estimated Average Glucose Result: 8.4 % (01-07-23 @ 06:31)      POCT Blood Glucose.: 340 mg/dL (11-16-23 @ 09:03)  POCT Blood Glucose.: 181 mg/dL (11-15-23 @ 23:15)  POCT Blood Glucose.: 100 mg/dL (11-15-23 @ 21:54)  POCT Blood Glucose.: 71 mg/dL (11-15-23 @ 21:24)  POCT Blood Glucose.: 67 mg/dL (11-15-23 @ 21:22)  POCT Blood Glucose.: 317 mg/dL (11-15-23 @ 17:14)  POCT Blood Glucose.: 470 mg/dL (11-15-23 @ 12:45)  POCT Blood Glucose.: 480 mg/dL (11-15-23 @ 12:43)  POCT Blood Glucose.: 215 mg/dL (11-15-23 @ 08:29)  POCT Blood Glucose.: 325 mg/dL (11-14-23 @ 22:43)  POCT Blood Glucose.: 243 mg/dL (11-14-23 @ 17:04)  POCT Blood Glucose.: 445 mg/dL (11-14-23 @ 12:53)  POCT Blood Glucose.: 433 mg/dL (11-14-23 @ 12:51)  POCT Blood Glucose.: 269 mg/dL (11-14-23 @ 08:43)  POCT Blood Glucose.: 190 mg/dL (11-13-23 @ 22:26)  POCT Blood Glucose.: 423 mg/dL (11-13-23 @ 17:18)  POCT Blood Glucose.: 260 mg/dL (11-13-23 @ 12:35)      11-15    141  |  103  |  35<H>  ----------------------------<  333<H>  3.6   |  25  |  1.24    eGFR: 63    Ca    8.8      11-15  Mg     2.20     11-15  Phos  4.2     11-15    TPro  7.5  /  Alb  3.9  /  TBili  0.4  /  DBili  x   /  AST  14  /  ALT  15  /  AlkPhos  98  11-15      Thyroid Function Tests:                         Admitted for: Asthma with acute exacerbation    Following for: steroid induced hyperglycemia    Subjective/ OVN: episode of hypoglycemia at around 9pm - BG of 67. Interview conducted with [employer] ([place] ) #[id number]. Pt states felt a little dizzy yesterday, but today is not dizzy or lightheaded, had no nausea/vomiting, chest pain, abdominal pain. States has been eating all meals and appetite is not decreased.      MEDICATIONS  (STANDING):  apixaban 5 milliGRAM(s) Oral every 12 hours  budesonide 160 MICROgram(s)/formoterol 4.5 MICROgram(s) Inhaler 2 Puff(s) Inhalation two times a day  dextrose 5%. 1000 milliLiter(s) (50 mL/Hr) IV Continuous <Continuous>  dextrose 5%. 1000 milliLiter(s) (100 mL/Hr) IV Continuous <Continuous>  dextrose 50% Injectable 25 Gram(s) IV Push once  dextrose 50% Injectable 12.5 Gram(s) IV Push once  dextrose 50% Injectable 25 Gram(s) IV Push once  glucagon  Injectable 1 milliGRAM(s) IntraMuscular once  insulin lispro (ADMELOG) corrective regimen sliding scale   SubCutaneous three times a day before meals  insulin lispro (ADMELOG) corrective regimen sliding scale   SubCutaneous at bedtime  insulin lispro Injectable (ADMELOG) 18 Unit(s) SubCutaneous three times a day before meals  metoclopramide 10 milliGRAM(s) Oral at bedtime  metoprolol succinate ER 75 milliGRAM(s) Oral daily  montelukast 10 milliGRAM(s) Oral daily  predniSONE   Tablet   Oral   tamsulosin 0.4 milliGRAM(s) Oral at bedtime    MEDICATIONS  (PRN):  dextrose Oral Gel 15 Gram(s) Oral once PRN Blood Glucose LESS THAN 70 milliGRAM(s)/deciliter      Allergies    No Known Allergies    Intolerances          PHYSICAL EXAM:  VITALS: T(C): 36.7 (11-16-23 @ 08:30)  T(F): 98 (11-16-23 @ 08:30), Max: 98.6 (11-15-23 @ 12:37)  HR: 98 (11-16-23 @ 08:30) (63 - 112)  BP: 131/78 (11-16-23 @ 08:30) (105/74 - 199/81)  RR:  (17 - 19)  SpO2:  (98% - 100%)  Wt(kg): --  GENERAL: NAD  EYES: No proptosis, no lid lag, anicteric  RESPIRATORY: Clear to auscultation bilaterally  CARDIOVASCULAR: Regular rate and rhythm  GI: Soft, nontender, non distended  EXT: b/l feet without wounds, no pitting edema  PSYCH: Alert and oriented x 3, reactive affect      A1C with Estimated Average Glucose Result: 8.7 % (11-12-23 @ 16:20)  A1C with Estimated Average Glucose Result: 8.4 % (01-07-23 @ 06:31)      POCT Blood Glucose.: 340 mg/dL (11-16-23 @ 09:03)  POCT Blood Glucose.: 181 mg/dL (11-15-23 @ 23:15)  POCT Blood Glucose.: 100 mg/dL (11-15-23 @ 21:54)  POCT Blood Glucose.: 71 mg/dL (11-15-23 @ 21:24)  POCT Blood Glucose.: 67 mg/dL (11-15-23 @ 21:22)  POCT Blood Glucose.: 317 mg/dL (11-15-23 @ 17:14)  POCT Blood Glucose.: 470 mg/dL (11-15-23 @ 12:45)  POCT Blood Glucose.: 480 mg/dL (11-15-23 @ 12:43)  POCT Blood Glucose.: 215 mg/dL (11-15-23 @ 08:29)  POCT Blood Glucose.: 325 mg/dL (11-14-23 @ 22:43)  POCT Blood Glucose.: 243 mg/dL (11-14-23 @ 17:04)  POCT Blood Glucose.: 445 mg/dL (11-14-23 @ 12:53)  POCT Blood Glucose.: 433 mg/dL (11-14-23 @ 12:51)  POCT Blood Glucose.: 269 mg/dL (11-14-23 @ 08:43)  POCT Blood Glucose.: 190 mg/dL (11-13-23 @ 22:26)  POCT Blood Glucose.: 423 mg/dL (11-13-23 @ 17:18)  POCT Blood Glucose.: 260 mg/dL (11-13-23 @ 12:35)      11-15    141  |  103  |  35<H>  ----------------------------<  333<H>  3.6   |  25  |  1.24    eGFR: 63    Ca    8.8      11-15  Mg     2.20     11-15  Phos  4.2     11-15    TPro  7.5  /  Alb  3.9  /  TBili  0.4  /  DBili  x   /  AST  14  /  ALT  15  /  AlkPhos  98  11-15      Thyroid Function Tests:

## 2023-11-16 NOTE — DISCHARGE NOTE NURSING/CASE MANAGEMENT/SOCIAL WORK - NSDCFUADDAPPT_GEN_ALL_CORE_FT
Emailed [email or web address] for follow up   Please follow up with your cardiologist for the new findings in TTE as well as missed cardiology appointment while inpatient.   Please use the information provided to schedule an appointment with [facility] Endocrinology.

## 2023-11-16 NOTE — PROGRESS NOTE ADULT - PROBLEM SELECTOR PROBLEM 3
Acute decompensated heart failure
Type II diabetes mellitus
Acute decompensated heart failure

## 2023-11-16 NOTE — PROGRESS NOTE ADULT - PROBLEM SELECTOR PROBLEM 1
Steroid-induced hyperglycemia
Acute respiratory failure with hypercapnia

## 2023-11-16 NOTE — PROGRESS NOTE ADULT - PROBLEM SELECTOR PLAN 8
Asa 81 not part of home medications    Patient should be aspirin 81 and eliquis  c/w High intensity statin  Patient should be on ASA per last cardiology note in January, patient to have f/u with cards
Island Pedicle Flap Text: The defect edges were debeveled with a #15 scalpel blade.  Given the location of the defect, shape of the defect and the proximity to free margins an island pedicle advancement flap was deemed most appropriate.  Using a sterile surgical marker, an appropriate advancement flap was drawn incorporating the defect, outlining the appropriate donor tissue and placing the expected incisions within the relaxed skin tension lines where possible.    The area thus outlined was incised deep to adipose tissue with a #15 scalpel blade.  The skin margins were undermined to an appropriate distance in all directions around the primary defect and laterally outward around the island pedicle utilizing iris scissors.  There was minimal undermining beneath the pedicle flap.

## 2023-11-16 NOTE — PROGRESS NOTE ADULT - SUBJECTIVE AND OBJECTIVE BOX
[facility]   MD FE COUGHLIN MD ANGELA WONG, PA    TEL:  OFFICE: [phone]  From 5pm-7am Answering Service [phone]    -- RENAL FOLLOW UP NOTE ---Date of Service 11-16-23 @ 10:24    Patient is a 68y old  Male who presents with a chief complaint of COPD/Asthma exacerbation (16 Nov 2023 07:22)      Patient seen and examined at bedside. No chest pain/sob    VITALS:  T(F): 98 (11-16-23 @ 08:30), Max: 98.6 (11-15-23 @ 12:37)  HR: 98 (11-16-23 @ 08:30)  BP: 131/78 (11-16-23 @ 08:30)  RR: 18 (11-16-23 @ 08:30)  SpO2: 100% (11-16-23 @ 08:30)  Wt(kg): --    Height (cm): 162.6 (11-15 @ 23:25)    PHYSICAL EXAM:  General: NAD  Neck: No JVD  Respiratory: CTAB, no wheezes, rales or rhonchi  Cardiovascular: S1, S2, RRR  Gastrointestinal: BS+, soft, NT/ND  Extremities: No peripheral edema    Hospital Medications:   MEDICATIONS  (STANDING):  apixaban 5 milliGRAM(s) Oral every 12 hours  budesonide 160 MICROgram(s)/formoterol 4.5 MICROgram(s) Inhaler 2 Puff(s) Inhalation two times a day  dextrose 5%. 1000 milliLiter(s) (50 mL/Hr) IV Continuous <Continuous>  dextrose 5%. 1000 milliLiter(s) (100 mL/Hr) IV Continuous <Continuous>  dextrose 50% Injectable 25 Gram(s) IV Push once  dextrose 50% Injectable 12.5 Gram(s) IV Push once  dextrose 50% Injectable 25 Gram(s) IV Push once  glucagon  Injectable 1 milliGRAM(s) IntraMuscular once  insulin lispro (ADMELOG) corrective regimen sliding scale   SubCutaneous three times a day before meals  insulin lispro (ADMELOG) corrective regimen sliding scale   SubCutaneous at bedtime  insulin lispro Injectable (ADMELOG) 18 Unit(s) SubCutaneous three times a day before meals  metoclopramide 10 milliGRAM(s) Oral at bedtime  metoprolol succinate ER 75 milliGRAM(s) Oral daily  montelukast 10 milliGRAM(s) Oral daily  predniSONE   Tablet   Oral   tamsulosin 0.4 milliGRAM(s) Oral at bedtime      LABS:  11-15    141  |  103  |  35<H>  ----------------------------<  333<H>  3.6   |  25  |  1.24    Ca    8.8      15 Nov 2023 11:39  Phos  4.2     11-15  Mg     2.20     11-15    TPro  7.5  /  Alb  3.9  /  TBili  0.4  /  DBili      /  AST  14  /  ALT  15  /  AlkPhos  98  11-15    Creatinine Trend: 1.24 <--, 1.40 <--, 1.27 <--, 1.24 <--    Albumin: 3.9 g/dL (11-15 @ 11:39)  Phosphorus: 4.2 mg/dL (11-15 @ 11:39)                              12.4   15.99 )-----------( 292      ( 15 Nov 2023 07:34 )             37.8     Urine Studies:  Urinalysis - [11-15-23 @ 11:39]      Color  / Appearance  / SG  / pH       Gluc 333 / Ketone   / Bili  / Urobili        Blood  / Protein  / Leuk Est  / Nitrite       RBC  / WBC  / Hyaline  / Gran  / Sq Epi  / Non Sq Epi  / Bacteria       Lipid: chol 170, , HDL 34, LDL --      [01-08-23 @ 06:50]        RADIOLOGY & ADDITIONAL STUDIES:

## 2023-11-16 NOTE — PATIENT PROFILE ADULT - NSPROEXTENSIONSOFSELF_GEN_A_NUR
Physician Progress Note      PATIENT:               Odalis Stallworth  CSN #:                  [id number]  :                       1973  ADMIT DATE:       3/22/2022 1:55 PM  [address] [place] DATE:  RESPONDING  PROVIDER #:        Ronald Mg NP          QUERY TEXT:    Pt admitted with abd pain, nausea and vomiting . Pt noted to have anastomotic   gastric ulcer and  cannabinoid hyperemesis syndrome,. If possible, please   document in progress notes and discharge summary if you are evaluating and /or   treating any of the following: The medical record reflects the following:  Risk Factors: chronic abd pain and hx mx abd surgeries, marijuana abuse  , hx   gastric bypass  Clinical Indicators: Presents with nausea, vomiting and abd pain. has a   history of multiple abdominal surgeries and he has chronic abdominal pains. Tox screen is positive for cannabinoid. Per EGD-The stomach had evidence of   tasha-en-y gastric bypass. A small 2-3 mm non-bleeding anastomotic ulcer was   present. No bleeding present. No stricture present. The gastric pouch   otherwise appeared normal on forward and retroflexed views. Documentation per   GI of Keep on BID PPI and will add Carafate 1 gram QID. Not sure this is the   cause. Recommend avoiding Marijuana as well given toshia  Treatment: EGD, carafate ,PPI, Gi consult, discontinue marijuana use ,   dilaudid , ketamine, ativan, zofran, percocet, phenergan    Thank You, Jeff Fair RN CDS CRCR  [email or web address]  Options provided:  -- Abd pain, Nausea and Vomiting due to cannabinoid hyperemesis syndrome  -- Abd pain, Nausea and Vomiting due to gastric ulcer  -- Abd pain, Nausea and Vomiting is multifactorial and possibly due to   cannabinoid hyperemesis syndrome and/or gastric ulcer  -- Other - I will add my own diagnosis  -- Disagree - Not applicable / Not valid  -- Disagree - Clinically unable to determine / Unknown  -- Refer to Clinical Documentation Reviewer    PROVIDER RESPONSE TEXT:    This patient has abd pain, nausea and vomiting is multifactorial and possibly   due to cannabinoid hyperemesis syndrome and/or gastric ulcer    Query created by:  Horacio Fair on 3/25/2022 9:08 AM      Electronically signed by:  Germaine Mg NP 3/25/2022 9:16 AM none

## 2023-11-16 NOTE — PATIENT PROFILE ADULT - FALL HARM RISK - RISK INTERVENTIONS
Assistance with ambulation/Communicate Fall Risk and Risk Factors to all staff, patient, and family/Reinforce activity limits and safety measures with patient and family/Visual Cue: Yellow wristband/Bed in lowest position, wheels locked, appropriate side rails in place/Call bell, personal items and telephone in reach/Instruct patient to call for assistance before getting out of bed or chair/Non-slip footwear when patient is out of bed/Hope to call system/Physically safe environment - no spills, clutter or unnecessary equipment/Purposeful Proactive Rounding/Room/bathroom lighting operational, light cord in reach

## 2023-11-16 NOTE — PROGRESS NOTE ADULT - PROBLEM SELECTOR PLAN 2
c/w therapy as above  Steroids, azithromycin, inhalers, duonebs c/w therapy as above  Steroids, azithromycin, symbicort, singulair

## 2023-11-16 NOTE — PROVIDER CONTACT NOTE (CRITICAL VALUE NOTIFICATION) - ACTION/TREATMENT ORDERED:
verified BG via FS, sliding scale adjusted, Insulin administered.
IVPB Calcium Gluconate given as ordered

## 2023-11-16 NOTE — PROGRESS NOTE ADULT - ASSESSMENT
68M Bulgarian-speaking w/ COPD/Asthma overlap current smoker with >20pack years, T2DM, HTN, CAD s/p 1 stent, Afib presenting with coughx4 days and SOB x2days concerning for asthma exacerbation vs new CHF. Admitted to medicine for further evaluation.

## 2023-11-16 NOTE — PROGRESS NOTE ADULT - PROBLEM/PLAN-1
1
DISPLAY PLAN FREE TEXT

## 2023-11-16 NOTE — PROGRESS NOTE ADULT - ASSESSMENT
68M Greek-speaking w/ COPD/Asthma overlap current smoker with >20pack years, T2DM, gastroparesis, HTN, CAD s/p 1 stent, Afib presenting with coughx4 days and SOB x2days. with worsen CHEW. nephrology consulted for CHRISTA    CHRISTA  baseline ~ 1.1  now with worsen   given lasix 40x1  on losartan 100 daily  uncontrolled hyperglycemia on steroid for copd  CHRISTA likely prerenal   hold losartan  ordered for LR @ 50cc/hr x5 hrs by team  renal function better pending today's bmp. continue to monitor. can resume arb once glucose and scr better if needed  monitor  avoid nephrotoxic agents    sob  likely asthma/copd  f/u pulm  on steroid    htn  controlled on bb  off ARB due to christa and hyperglycemia  monitor

## 2023-11-16 NOTE — PROGRESS NOTE ADULT - PROBLEM SELECTOR PROBLEM 5
Increased lactic acid level

## 2023-11-16 NOTE — PROGRESS NOTE ADULT - PROBLEM SELECTOR PLAN 1
With respiratory acidosis 2.2 CO2 retention- now resolved   Correlates to COPD exacerbation    - transition to room air- trend VBGs, check daily, start on nasal canula if needed  - O2 sat goal 90%-95%  Pulm notified patient is here- agree with course of action and can formally consult if needed    methyl-prednisonolone 40mg IV q8hrs d/c'd- now transitioned to PO taper for 5 days   - last dose of azithromycin 11/14  - c/w symbicort + Duonebs + singulair  - Duonebs q6hr With respiratory acidosis 2.2 CO2 retention- now resolved   Correlates to COPD exacerbation    - transition to room air- trend VBGs, check daily, start on nasal canula if needed  - O2 sat goal 90%-95%  Pulm notified patient is here- agree with course of action and can formally consult if needed    methyl-prednisonolone 40mg IV q8hrs d/c'd- now transitioned to PO taper for 10 days  - last dose of azithromycin 11/14  - c/w symbicort + singulair  - d/c Duonebs q6hr

## 2023-11-16 NOTE — DISCHARGE NOTE NURSING/CASE MANAGEMENT/SOCIAL WORK - NSDCPEFALRISK_GEN_ALL_CORE
For information on Fall & Injury Prevention, visit: [email or web address] OR  [email or web address] OR  https://www.cdc.gov/steadi/patient.html

## 2023-11-16 NOTE — PROGRESS NOTE ADULT - PROVIDER SPECIALTY LIST ADULT
Endocrinology
Nephrology
Internal Medicine
Nephrology
MICU
Internal Medicine

## 2023-11-16 NOTE — PROGRESS NOTE ADULT - ASSESSMENT
A/P: 67 yo M with uncontrolled DM2, asthma, HTN, CAD, afib presenting with SOB, COPD exacerbation, consulted for DM and steroid induced hyperglycemia. Endocrine team consulted for uncontrolled diabetes. Patient is high risk with high level decision making due to uncontrolled diabetes which places patient at high risk for cardiovascular and cerebrovascular events. Patient with lability of glucose requiring close monitoring and insulin adjustments.    #Type 2 Diabetes Mellitus c/b steroid induced hyperglycemia  - HbA1c 8.7% ; home regimen: metformin 1 g bid  - Episode of hypoglycemia at 9pm 11/15 with FS at 67  - prior had 12 units correctional given at lunch + FS checked at 5:30pm with 8 units correctional + 18 units admelog given, leading to total 38 units admelog given for lunch time food, likely cause of hypoglycemia.   - Required 8 units correctional pre-breakfast with FS of 340.     Steroid taper:  s/p methylpred 40 mg on 11/14  s/p prednisone 40 mg on 11/15 today  11/16 prednisone 40 mg  11/17-18 prednisone 30 mg  11/19-20 prednisone 20 mg  11/21-23 prednisone 10 mg    - Recommend lantus 34 units QHS (will have to increase)  - Recommend admelog 18 unit with meals  - Recommend moderate admelog correction scale TIDQAC and QHS  - Please check FSG before meals and QHS, or q6h while NPO  - RD consult  - hypoglycemia orderset prn  - son will help with insulin on discharge   - Discharge planning:  basal-bolus insulin + metformin 1000 mg bid (egfr 63)    ***TENTATIVE DISCHARGE PLAN - WILL NEED TO BE MODIFIED BEFORE DISCHARGE***  While on prednisone 30 mg  lantus 28 units at bedtime  Admelog 14 units before meals    While on prednisone 20 mg:  lantus 22 units at bedtime  Admelog 12 units before meals    While on prednisone 10 mg  lantus 18 units at bedtime  admelog 10 units before meals    OFF prednisone:  lantus 15 units  metformin 1g bid  no admelog    If off steroids and:  -morning sugars are less than 80, reduce lantus to 12 units    Can also provide correction scale while on steroids:  Take additional admelog before meals based on sugar if elevated:  2 Unit(s) if Glucose 151 - 200  4 Unit(s) if Glucose 201 - 250  6 Unit(s) if Glucose 251 - 300  8 Unit(s) if Glucose 301 - 350  10 Unit(s) if Glucose 351 - 400  12 Unit(s) if Glucose GREATER THAN 400, call your doctor    DISCHARGE PLANNING:  - Make sure patient knows how to inject insulin and check fingersticks with glucometer  - At home, Patient should check FSBG premeals and bedtime. Pt should call their doctor when FSBG <70 or above >400 and or consistently above 200s as changes in the regimen will have to be made.  -Advised that pt should call PMD for DM related questions or concerns until pt is seen by CDE or Endocrine team.    DISCHARGE RX:  - Glucometer (ACCU_CHECK pratibha Connect, [employer]ensia Contour Next EZ or One, Freestyle [place] LITE or OneTouch Verio IQ)  - Glucometer test strips and lancets  (make sure compatible with glucometer): Dispense #100 (or #200), use as directed (3 refills)  - BD kenji 4mm pen needles: dispense #100, use as directed (3 refills)  - Alcohol pads: dispense #100, use as directed (3 refills)    #Hypertension  - BP goal <130/80, sBP in 120s-140s. On metoprolol, holding home losartan until CHRISTA resolved per primary.   - Management as per primary team    #Hyperlipidemia  - check fasting lipid panel as outpatient  - likely will need high intensity statin given CAD, DM2    If before 9AM or after 5PM, or on weekends/holidays, please call the Endocrine answering service for assistance ([phone]).  For nonurgent matters, please email [email or web address] for assistance.    NOTE AND RECOMMENDATIONS NOT FINALIZED UNTIL SIGNED BY ATTENDING      A/P: 69 yo M with uncontrolled DM2, asthma, HTN, CAD, afib presenting with SOB, COPD exacerbation, consulted for DM and steroid induced hyperglycemia. Endocrine team consulted for uncontrolled diabetes. Patient is high risk with high level decision making due to uncontrolled diabetes which places patient at high risk for cardiovascular and cerebrovascular events. Patient with lability of glucose requiring close monitoring and insulin adjustments.    #Type 2 Diabetes Mellitus c/b steroid induced hyperglycemia  - HbA1c 8.7% ; home regimen: metformin 1 g bid  - Episode of hypoglycemia at 9pm 11/15 with FS at 67  - prior had 12 units correctional given at lunch + FS checked at 5:30pm with 8 units correctional + 18 units admelog given, leading to total 38 units admelog given for lunch time food, likely cause of hypoglycemia.   - Required 8 units correctional pre-breakfast with FS of 340.     Steroid taper:  s/p methylpred 40 mg on 11/14  s/p prednisone 40 mg on 11/15 today  11/16 prednisone 40 mg  11/17-18 prednisone 30 mg  11/19-20 prednisone 20 mg  11/21-23 prednisone 10 mg    - Reduce lantus to 20 units qhs  - Reduce admelog to 10 unit with meals  - Recommend moderate admelog correction scale TIDQAC and QHS  - Please check FSG before meals and QHS, or q6h while NPO  - RD consult  - hypoglycemia orderset prn  - son will help with insulin on discharge   - Discharge planning:  basal-bolus insulin + metformin 1000 mg bid (egfr 63)    ***TENTATIVE DISCHARGE PLAN - WILL NEED TO BE MODIFIED BEFORE DISCHARGE***  While on prednisone 30 mg  lantus 28 units at bedtime  Admelog 14 units before meals    While on prednisone 20 mg:  lantus 22 units at bedtime  Admelog 12 units before meals    While on prednisone 10 mg  lantus 18 units at bedtime  admelog 10 units before meals    OFF prednisone:  lantus 15 units  metformin 1g bid  no admelog    If off steroids and:  -morning sugars are less than 80, reduce lantus to 12 units    Can also provide correction scale while on steroids:  Take additional admelog before meals based on sugar if elevated:  2 Unit(s) if Glucose 151 - 200  4 Unit(s) if Glucose 201 - 250  6 Unit(s) if Glucose 251 - 300  8 Unit(s) if Glucose 301 - 350  10 Unit(s) if Glucose 351 - 400  12 Unit(s) if Glucose GREATER THAN 400, call your doctor    DISCHARGE PLANNING:  - Make sure patient knows how to inject insulin and check fingersticks with glucometer  - At home, Patient should check FSBG premeals and bedtime. Pt should call their doctor when FSBG <70 or above >400 and or consistently above 200s as changes in the regimen will have to be made.  -Advised that pt should call PMD for DM related questions or concerns until pt is seen by CDE or Endocrine team.    DISCHARGE RX:  - Glucometer (ACCU_CHECK pratibha Connect, Ascensia Contour Next EZ or One, Freestyle [place] LITE or OneTouch Verio IQ)  - Glucometer test strips and lancets  (make sure compatible with glucometer): Dispense #100 (or #200), use as directed (3 refills)  - BD kenji 4mm pen needles: dispense #100, use as directed (3 refills)  - Alcohol pads: dispense #100, use as directed (3 refills)    #Hypertension  - BP goal <130/80, sBP in 120s-140s. On metoprolol, holding home losartan until CHRISTA resolved per primary.   - Management as per primary team    #Hyperlipidemia  - check fasting lipid panel as outpatient  - likely will need high intensity statin given CAD, DM2    If before 9AM or after 5PM, or on weekends/holidays, please call the Endocrine answering service for assistance ([phone]).  For nonurgent matters, please email [email or web address] for assistance.    NOTE AND RECOMMENDATIONS NOT FINALIZED UNTIL SIGNED BY ATTENDING      A/P: 67 yo M with uncontrolled DM2, asthma, HTN, CAD, afib presenting with SOB, COPD exacerbation, consulted for DM and steroid induced hyperglycemia. Endocrine team consulted for uncontrolled diabetes. Patient is high risk with high level decision making due to uncontrolled diabetes which places patient at high risk for cardiovascular and cerebrovascular events. Patient with lability of glucose requiring close monitoring and insulin adjustments.    #Type 2 Diabetes Mellitus c/b steroid induced hyperglycemia  - HbA1c 8.7% ; home regimen: metformin 1 g bid  - Episode of hypoglycemia at 9pm 11/15 with FS at 67  - prior had 20 units total given at lunch + FS checked at 5:30pm with 26 total units given, likely too much insulin leading to hypoglycemic event.  - Required 8 units correctional pre-breakfast with FS of 340. When on 20 units lantus before, there was a net decrease in bedtime () to AM (11/15) glucose from 325 to 215 so will continue with 20 units lantus.     Steroid taper:  s/p methylpred 40 mg on   s/p prednisone 40 mg on 11/15 today   prednisone 40 mg  - prednisone 30 mg  - prednisone 20 mg  - prednisone 10 mg    - Reduce lantus to 20 units qhs  - Reduce admelog to 10 units with meals  - Recommend moderate admelog correction scale TIDQAC and QHS  - Please check FSG before meals and QHS, or q6h while NPO  - RD consult  - hypoglycemia orderset prn  - son will help with insulin on discharge   - Discharge planning:  basal-bolus insulin + metformin 1000 mg bid (egfr 63)  - continue metformin on discharge + insulin plan for taper as below:    DISCHARGE INSULIN PLAN   While on prednisone 30 mg  20 units NPH in AM (same time as taking prednisone)    While on prednisone 20 m units NPH in AM (same time as taking prednisone)    While on prednisone 10 mg  10 units NPH in AM (same time as taking prednisone)      OFF prednisone:  continue only metformin 1g bid  no NPH    DISCHARGE PLANNING:  - Make sure patient knows how to inject insulin and check fingersticks with glucometer  - please check to ensure patient has coverage for humalin or novolin N pens and pen needles  - At home, Patient should check FSBG premeals and bedtime. Pt should call their doctor when FSBG <70 or above >400 and or consistently above 200s as changes in the regimen will have to be made.  -Advised that pt should call PMD for DM related questions or concerns until pt is seen by CDE or Endocrine team.    DISCHARGE RX:  - Glucometer (ACCU_CHECK pratibha Connect, Ascensia Contour Next EZ or One, Freestyle [place] LITE or OneTouch Verio IQ)  - Glucometer test strips and lancets  (make sure compatible with glucometer): Dispense #100 (or #200), use as directed (3 refills)  - Humalin or novolin N pens and pen needles, depending on what is covered for patient  - Alcohol pads: dispense #100, use as directed (3 refills)    #Hypertension  - BP goal <130/80, sBP in 120s-140s. On metoprolol, holding home losartan until CHRISTA resolved per primary.   - Management as per primary team    #Hyperlipidemia  - check fasting lipid panel as outpatient  - likely will need high intensity statin given CAD, DM2    If before 9AM or after 5PM, or on weekends/holidays, please call the Endocrine answering service for assistance ([phone]).  For nonurgent matters, please email [email or web address] for assistance.    NOTE AND RECOMMENDATIONS NOT FINALIZED UNTIL SIGNED BY ATTENDING      A/P: 69 yo M with uncontrolled DM2, asthma, HTN, CAD, afib presenting with SOB, COPD exacerbation, consulted for DM and steroid induced hyperglycemia. Endocrine team consulted for uncontrolled diabetes. Patient is high risk with high level decision making due to uncontrolled diabetes which places patient at high risk for cardiovascular and cerebrovascular events. Patient with lability of glucose requiring close monitoring and insulin adjustments.    #Type 2 Diabetes Mellitus c/b steroid induced hyperglycemia  - HbA1c 8.7% ; home regimen: metformin 1 g bid  - Episode of hypoglycemia at 9pm 11/15 with FS at 67  - prior had 20 units total given at lunch + FS checked at 5:30pm with 26 total units given, likely too much insulin leading to hypoglycemic event.  - Required 8 units correctional pre-breakfast with FS of 340. When on 20 units lantus before, there was a net decrease in bedtime (11/14) to AM (11/15) glucose from 325 to 215 so will continue with 20 units lantus.     Steroid taper:  s/p methylpred 40 mg on 11/14  s/p prednisone 40 mg on 11/15 today  11/16 prednisone 40 mg  11/17-18 prednisone 30 mg  11/19-20 prednisone 20 mg  11/21-23 prednisone 10 mg    - Reduce lantus to 20 units qhs  - Reduce admelog to 10 units with meals  - Recommend moderate admelog correction scale TIDQAC and QHS  - Please check FSG before meals and QHS, or q6h while NPO  - RD consult  - hypoglycemia orderset prn  - son will help with insulin on discharge   - Discharge planning:  basal-bolus insulin + metformin 1000 mg bid (egfr 63)  - continue metformin on discharge + insulin plan for taper as below:    DISCHARGE INSULIN PLAN   While on prednisone 30 mg  20 units NPH in AM (same time as taking prednisone)    While on prednisone 20 mg:  15 units NPH in AM (same time as taking prednisone)    While on prednisone 10 mg  10 units NPH in AM (same time as taking prednisone)      OFF prednisone:  continue only metformin 1g bid  no NPH    DISCHARGE PLANNING:  - Make sure patient knows how to inject insulin and check fingersticks with glucometer  - please check to ensure patient has coverage for humalin or novolin N pens and pen needles  - At home, Patient should check FSBG premeals and bedtime. Pt should call their doctor when FSBG <70 or above >400 and or consistently above 200s as changes in the regimen will have to be made.  -Advised that pt should call PMD for DM related questions or concerns until pt is seen by CDE or Endocrine team.    DISCHARGE RX:  - Glucometer (ACCU_CHECK pratibha Connect, Ascensia Contour Next EZ or One, Freestyle [place] LITE or OneTouch Verio IQ)  - Glucometer test strips and lancets  (make sure compatible with glucometer): Dispense #100 (or #200), use as directed (3 refills)  - Humalin or novolin N pens and pen needles, depending on what is covered for patient  - Alcohol pads: dispense #100, use as directed (3 refills)    #Hypertension  - BP goal <130/80, sBP in 120s-140s. On metoprolol, holding home losartan until CHRISTA resolved per primary.   - Management as per primary team    #Hyperlipidemia  - check fasting lipid panel as outpatient  - likely will need high intensity statin given CAD, DM2    If before 9AM or after 5PM, or on weekends/holidays, please call the Endocrine answering service for assistance ([phone]).  For nonurgent matters, please email [email or web address] for assistance.    NOTE AND RECOMMENDATIONS NOT FINALIZED UNTIL SIGNED BY ATTENDING      A/P: 67 yo M with uncontrolled DM2, asthma, HTN, CAD, afib presenting with SOB, COPD exacerbation, consulted for DM and steroid induced hyperglycemia. Endocrine team consulted for uncontrolled diabetes. Patient is high risk with high level decision making due to uncontrolled diabetes which places patient at high risk for cardiovascular and cerebrovascular events. Patient with lability of glucose requiring close monitoring and insulin adjustments.    #Type 2 Diabetes Mellitus c/b steroid induced hyperglycemia  - HbA1c 8.7% ; home regimen: metformin 1 g bid  - Episode of hypoglycemia at 9pm 11/15 with FS at 67  - prior had 20 units total given at lunch + FS checked at 5:30pm with 26 total units given, likely too much insulin leading to hypoglycemic event.  - Required 8 units correctional pre-breakfast with FS of 340. When on 20 units lantus before, there was a net decrease in bedtime (11/14) to AM (11/15) glucose from 325 to 215 so will continue with 20 units lantus.     Steroid taper:  s/p methylpred 40 mg on 11/14  s/p prednisone 40 mg on 11/15 today  11/16 prednisone 40 mg  11/17-18 prednisone 30 mg  11/19-20 prednisone 20 mg  11/21-23 prednisone 10 mg    - Reduce lantus to 20 units qhs  - Reduce admelog to 10 units with meals  - Recommend moderate admelog correction scale TIDQAC and QHS  - Please check FSG before meals and QHS, or q6h while NPO  - RD consult  - hypoglycemia orderset prn  - son will help with insulin on discharge   - Discharge planning:  basal-bolus insulin + metformin 1000 mg bid (egfr 63)  - continue metformin on discharge + insulin plan for taper as below:    DISCHARGE INSULIN PLAN   While on prednisone 30 mg  20 units NPH in AM (same time as taking prednisone)    While on prednisone 20 mg:  15 units NPH in AM (same time as taking prednisone)    While on prednisone 10 mg  10 units NPH in AM (same time as taking prednisone)      OFF prednisone:  continue only metformin 1g bid  no NPH    DISCHARGE PLANNING:  - Make sure patient knows how to inject insulin and check fingersticks with glucometer  - please check to ensure patient has coverage for humalin or novolin N pens and pen needles  - At home, Patient should check FSBG premeals and bedtime. Pt should call their doctor when FSBG <70 or above >400 and or consistently above 200s as changes in the regimen will have to be made.  -Advised that pt should call PMD for DM related questions or concerns until pt is seen by CDE or Endocrine team.    DISCHARGE RX:  - Glucometer (ACCU_CHECK pratibha Connect, Ascensia Contour Next EZ or One, Freestyle [place] LITE or OneTouch Verio IQ)  - Glucometer test strips and lancets  (make sure compatible with glucometer): Dispense #100 (or #200), use as directed (3 refills)  - Humalin or novolin N pens and pen needles, depending on what is covered for patient  - Alcohol pads: dispense #100, use as directed (3 refills)    #Hypertension  - BP goal <130/80, sBP in 120s-140s. On metoprolol, holding home losartan until CHRISTA resolved per primary.   - Management as per primary team    #Hyperlipidemia  - check fasting lipid panel as outpatient  - likely will need high intensity statin given CAD, DM2

## 2023-11-16 NOTE — PROGRESS NOTE ADULT - PROBLEM SELECTOR PLAN 5
Lactic acidosis likely iso of albuterol use  Low index of suspicion for low flow/Type A state.

## 2023-11-16 NOTE — PROGRESS NOTE ADULT - PROBLEM SELECTOR PLAN 9
Eliquis 5mg BID  Denies prior CHF so CHADSVASC of 3  Given suspicion high for CHF --> CHADSVASC 4  - TTE completed with decreased EF compared to previous TTE completed in January 2023. Cardiology recommendations for outpatient f/u
Eliquis 5mg BID  Denies prior CHF so CHADSVASC of 3  Given suspicion high for CHF --> CHADSVASC 4
Eliquis 5mg BID  Denies prior CHF so CHADSVASC of 3  Given suspicion high for CHF --> CHADSVASC 4  - TTE completed with decreased EF compared to previous TTE completed in January 2023. Cardiology recommendations for outpatient f/u
Eliquis 5mg BID  Denies prior CHF so CHADSVASC of 3  Given suspicion high for CHF --> CHADSVASC 4

## 2023-11-16 NOTE — PROGRESS NOTE ADULT - SUBJECTIVE AND OBJECTIVE BOX
**************************************  Pati Curiel, PGY1  **************************************    INTERVAL HPI/OVERNIGHT EVENTS: Episode of nonsymptomatic hypoglycemia with resolution after given fruit juice.   Patient was seen and examined at bedside. As per nurse and patient, no o/n events, patient resting comfortably. No complaints at this time. Patient denies: fever, chills, dizziness, weakness, HA, Changes in vision, CP, palpitations, SOB, cough, N/V/D/C, dysuria, changes in bowel movements, LE edema. ROS otherwise negative.    VITAL SIGNS:  T(F): 97.3 (11-16-23 @ 04:35)  HR: 95 (11-16-23 @ 04:35)  BP: 126/77 (11-16-23 @ 04:35)  RR: 17 (11-16-23 @ 04:35)  SpO2: 100% (11-16-23 @ 04:35)  Wt(kg): --    PHYSICAL EXAM:  General: WN/WD NAD, on nasal canula   Neurology: A&Ox3, nonfocal, AMARAL x 4  Eyes: PERRLA/ EOMI, Gross vision intact  ENT/Neck: Neck supple, trachea midline, No JVD, Gross hearing intact, No HJR  Respiratory: Wheezing and crackles bilaterally, no accessory muscle use  CV: irregular rhythm, +S1/S2, -S3/S4, no murmurs, rubs or gallops, no LE edema  Abdominal: Soft, NT, ND +BS, No HSM  MSK: 5/5 strength UE/LE bilaterally  Extremities: No edema, 2+ peripheral pulses  Skin: No Rashes, Hematoma, Ecchymosi    MEDICATIONS  (STANDING):  apixaban 5 milliGRAM(s) Oral every 12 hours  budesonide 160 MICROgram(s)/formoterol 4.5 MICROgram(s) Inhaler 2 Puff(s) Inhalation two times a day  dextrose 5%. 1000 milliLiter(s) (50 mL/Hr) IV Continuous <Continuous>  dextrose 5%. 1000 milliLiter(s) (100 mL/Hr) IV Continuous <Continuous>  dextrose 50% Injectable 25 Gram(s) IV Push once  dextrose 50% Injectable 12.5 Gram(s) IV Push once  dextrose 50% Injectable 25 Gram(s) IV Push once  glucagon  Injectable 1 milliGRAM(s) IntraMuscular once  insulin lispro (ADMELOG) corrective regimen sliding scale   SubCutaneous three times a day before meals  insulin lispro (ADMELOG) corrective regimen sliding scale   SubCutaneous at bedtime  insulin lispro Injectable (ADMELOG) 18 Unit(s) SubCutaneous three times a day before meals  metoclopramide 10 milliGRAM(s) Oral at bedtime  metoprolol succinate ER 75 milliGRAM(s) Oral daily  montelukast 10 milliGRAM(s) Oral daily  predniSONE   Tablet   Oral   tamsulosin 0.4 milliGRAM(s) Oral at bedtime    MEDICATIONS  (PRN):  dextrose Oral Gel 15 Gram(s) Oral once PRN Blood Glucose LESS THAN 70 milliGRAM(s)/deciliter      Allergies    No Known Allergies    Intolerances        LABS:                        12.4   15.99 )-----------( 292      ( 15 Nov 2023 07:34 )             37.8     11-15    141  |  103  |  35<H>  ----------------------------<  333<H>  3.6   |  25  |  1.24    Ca    8.8      15 Nov 2023 11:39  Phos  4.2     11-15  Mg     2.20     11-15    TPro  7.5  /  Alb  3.9  /  TBili  0.4  /  DBili  x   /  AST  14  /  ALT  15  /  AlkPhos  98  11-15      Urinalysis Basic - ( 15 Nov 2023 11:39 )    Color: x / Appearance: x / SG: x / pH: x  Gluc: 333 mg/dL / Ketone: x  / Bili: x / Urobili: x   Blood: x / Protein: x / Nitrite: x   Leuk Esterase: x / RBC: x / WBC x   Sq Epi: x / Non Sq Epi: x / Bacteria: x        RADIOLOGY & ADDITIONAL TESTS:  Reviewed **************************************  Pati Curiel, PGY1  **************************************    INTERVAL HPI/OVERNIGHT EVENTS: Episode of nonsymptomatic hypoglycemia with resolution after given fruit juice.   Patient was seen and examined at bedside. As per nurse and patient, no o/n events, patient resting comfortably. No complaints at this time. Patient denies: fever, chills, dizziness, weakness, HA, Changes in vision, CP, palpitations, SOB, cough, N/V/D/C, dysuria, changes in bowel movements, LE edema. ROS otherwise negative.    VITAL SIGNS:  T(F): 97.3 (11-16-23 @ 04:35)  HR: 95 (11-16-23 @ 04:35)  BP: 126/77 (11-16-23 @ 04:35)  RR: 17 (11-16-23 @ 04:35)  SpO2: 100% (11-16-23 @ 04:35)  Wt(kg): --    PHYSICAL EXAM:  General: WN/WD NAD, on nasal canula   Neurology: A&Ox3, nonfocal, AMARAL x 4  Eyes: PERRLA/ EOMI, Gross vision intact  ENT/Neck: Neck supple, trachea midline, No JVD, Gross hearing intact, No HJR  Respiratory: Wheezing and crackles bilaterally, no accessory muscle use  CV: irregular rhythm, +S1/S2, -S3/S4, no murmurs, rubs or gallops, no LE edema  Abdominal: Soft, NT, ND +BS, No HSM  MSK: 5/5 strength UE/LE bilaterally  Extremities: No edema, 2+ peripheral pulses  Skin: No Rashes, Hematoma, Ecchymosi    MEDICATIONS  (STANDING):  apixaban 5 milliGRAM(s) Oral every 12 hours  budesonide 160 MICROgram(s)/formoterol 4.5 MICROgram(s) Inhaler 2 Puff(s) Inhalation two times a day  dextrose 5%. 1000 milliLiter(s) (50 mL/Hr) IV Continuous <Continuous>  dextrose 5%. 1000 milliLiter(s) (100 mL/Hr) IV Continuous <Continuous>  dextrose 50% Injectable 25 Gram(s) IV Push once  dextrose 50% Injectable 12.5 Gram(s) IV Push once  dextrose 50% Injectable 25 Gram(s) IV Push once  glucagon  Injectable 1 milliGRAM(s) IntraMuscular once  insulin lispro (ADMELOG) corrective regimen sliding scale   SubCutaneous three times a day before meals  insulin lispro (ADMELOG) corrective regimen sliding scale   SubCutaneous at bedtime  insulin lispro Injectable (ADMELOG) 18 Unit(s) SubCutaneous three times a day before meals  metoclopramide 10 milliGRAM(s) Oral at bedtime  metoprolol succinate ER 75 milliGRAM(s) Oral daily  montelukast 10 milliGRAM(s) Oral daily  predniSONE   Tablet   Oral   tamsulosin 0.4 milliGRAM(s) Oral at bedtime    MEDICATIONS  (PRN):  dextrose Oral Gel 15 Gram(s) Oral once PRN Blood Glucose LESS THAN 70 milliGRAM(s)/deciliter      Allergies    No Known Allergies    Intolerances        LABS:                        12.4   15.99 )-----------( 292      ( 15 Nov 2023 07:34 )             37.8     11-15    141  |  103  |  35<H>  ----------------------------<  333<H>  3.6   |  25  |  1.24    Ca    8.8      15 Nov 2023 11:39  Phos  4.2     11-15  Mg     2.20     11-15    TPro  7.5  /  Alb  3.9  /  TBili  0.4  /  DBili  x   /  AST  14  /  ALT  15  /  AlkPhos  98  11-15      Urinalysis Basic - ( 15 Nov 2023 11:39 )    Color: x / Appearance: x / SG: x / pH: x  Gluc: 333 mg/dL / Ketone: x  / Bili: x / Urobili: x   Blood: x / Protein: x / Nitrite: x   Leuk Esterase: x / RBC: x / WBC x   Sq Epi: x / Non Sq Epi: x / Bacteria: x         RADIOLOGY & ADDITIONAL TESTS:  Reviewed

## 2023-11-16 NOTE — DISCHARGE NOTE NURSING/CASE MANAGEMENT/SOCIAL WORK - PATIENT PORTAL LINK FT
You can access the FollowMyHealth Patient Portal offered by [facility] by registering at the following website: [email or web address]. By joining [employer]’s FollowMyHealth portal, you will also be able to view your health information using other applications (apps) compatible with our system.

## 2023-11-16 NOTE — PROGRESS NOTE ADULT - PROBLEM SELECTOR PLAN 10
Offer nicotine patch  Smoking cessation SBIRT prior to discharge

## 2023-11-16 NOTE — PROGRESS NOTE ADULT - ATTENDING COMMENTS
67yo Azeri-speaking male pmhx Afib on Eliquis, CAD w/ stent, HTN, HLD, T2DM, Asthma, BIBEMS for respiratory distress. MICU consulted for new Bipap.   continue steroids, nebs  serial blood gas while on bipap, wean as tolerated   work up for possible CHF, TTE, trend CE, pro BNP, lasix monitor UO   tele, pulse oxim  reconsult as needed
DM2 exacerbated by steroid for COPD. Hypoglycemia and hyperglycemia inpatient on high doses of insulin related to steroid hyperglycemia.  Simplify discharge plan using NPH (Novolon-N or Humilin-N pen) with tapering doses of insulin as prednisone dose is tapered as outlined above.  Once off prednisone can stop insulin and just use metformin 1000mg BID.    Karin Swenson MD  Division of Endocrinology  Pager: [phone]    If after 6PM or before 9AM, or on weekends/holidays, please call endocrine answering service for assistance ([phone]).  For nonurgent matters email [email or web address] for assistance.
Pt seen this morning with son at bedside. Pt says SOB and cough improving. No fever or chills. No CP or palpitations now. Still with bilateral wheezing on exam but nonlabored breathing and appears comfortable.     69 yo M with PMH of COPD/Asthma, T2DM, HTN, CAD s/p 1 stent, and Afib presents with worsening cough and SOB, found to have acute hypercarbic respiratory failure in setting of COPD exacerbation. Had episodes of RVR on tele. Overall SOB improved.    # Acute hypercarbic respiratory failure  - required BIPAP transiently; now off bipap; now on room air    # Acute COPD exacerbation   - d/c nebs and resume albuterol MDI prn, steroid taper, azithromycin  - no focal consolidation on imaging; RVP negative    # Elevated ProBNP, without LE edema and prior TTE 10 months ago with normal LV systolic function  - TTE: moderately decreased Left ventricular systolic function EF 40 to 45%. Mild dilated LA. Reduced RV systolic function and mildly dilated RA. Mild AS and moderate AR.  - compared to echo from 10 months ago, LV systolic function reduced. D/w son - last stress test 2 years ago. Cath w/ stent placement in 2014 in [facility]. May need ischemic evaluation. HS d/w cardiology and patient can be followed up as planned in cardiology clinic.  - uptitrate metoprolol for rate control; resume HCTZ; will likely resume losartan tomorrow    # Afib  - C/w eliquis, metoprolol    # DM2 - uncontrolled in setting of steroids  - increase lantus to 25 units and admelog to 10 units TID AC with moderate dose correction scale premeal and QHS  - monitoring FSG while on steroids and will uptitrate insulin regimen as needed  - Endo consult requested as patient may need insulin on d/c while on steroid taper    # HTN  - c/w metoprolol (increase dose for rate control); may resume losartan tomorrow as CHRISTA resolving  - monitoring BP    # CAD  - Resume ASA, statin, BB    D/c planning.  Discussed with patient and son Al at bedside.
67 yo M with PMH of COPD/Asthma, T2DM, HTN, CAD s/p 1 stent, and Afib presents with worsening cough and SOB, found to have acute hypercarbic respiratory failure in setting of COPD exacerbation.    # Acute hypercarbic respiratory failure  - improved w/ BIPAP; now off bipap; continue to monitor    # Acute COPD exacerbation   - c/w duonebs, steroids, azithromycin  - no focal consolidation on imaging; RVP negative    # Elevated ProBNP, without LE edema and prior TTE 10 months ago with normal LV systolic function  - check TTE  - monitor I/O with diuresis  - c/w metoprolol    # Afib  - C/w eliquis, metoprolol    # DM2  - Lantus 10u qhs, SSI  - monitoring FSG while on steroids and will uptitrate insulin regimen as needed    # HTN  - resume losartan and metoprolol  - monitoring BP    # CAD  - Resume ASA, statin, BB    Discussed with patient and son Douglas at bedside.
67 yo M with PMH of COPD/Asthma, T2DM, HTN, CAD s/p 1 stent, and Afib presents with worsening cough and SOB, found to have acute hypercarbic respiratory failure in setting of COPD exacerbation. Had episodes of RVR on tele. Overall SOB improved.    # Acute hypercarbic respiratory failure  - improved w/ BIPAP; now off bipap; continue to monitor    # Acute COPD exacerbation   - c/w nebs, steroid taper, azithromycin  - no focal consolidation on imaging; RVP negative    # Elevated ProBNP, without LE edema and prior TTE 10 months ago with normal LV systolic function  - check TTE  - hold lasix since creat elevated (CHRISTA likely 2/2 diuresis)  - c/w metoprolol and uptitrate as needed for rate control (likely exacerbated by nebs)    # Afib  - C/w eliquis, metoprolol    # DM2 - uncontrolled in setting of steroids  - increase lantus to 20 units and admelog to 8 units TID AC with moderate dose correction scale premeal and QHS  - monitoring FSG while on steroids and will uptitrate insulin regimen as needed    # HTN  - c/w metoprolol; hold losartan i/s/o CHRISTA  - monitoring BP    # CAD  - Resume ASA, statin, BB    Discussed with patient and son Douglas at bedside.
67 yo M with PMH of COPD/Asthma, T2DM, HTN, CAD s/p 1 stent, and Afib presents with worsening cough and SOB, found to have acute hypercarbic respiratory failure in setting of COPD exacerbation. SOB/wheezing overall improved.    # Acute hypercarbic respiratory failure  - required BIPAP transiently; now off bipap; now on room air    # Acute COPD exacerbation   - d/c nebs and resume albuterol MDI prn, steroid taper, azithromycin  - no focal consolidation on imaging; RVP negative  - f/u with pulmonology within 7 days of discharge     # Elevated ProBNP, without LE edema and prior TTE 10 months ago with normal LV systolic function  - TTE: moderately decreased Left ventricular systolic function EF 40 to 45%. Mild dilated LA. Reduced RV systolic function and mildly dilated RA. Mild AS and moderate AR.  - compared to echo from 10 months ago, LV systolic function reduced. D/w son - last stress test 2 years ago. Cath w/ stent placement in 2014 in [facility]. May need ischemic evaluation. HS d/w cardiology and was advised to have patient follow-up in cardiology clinic.  - uptitrated metoprolol for rate control; resume HCTZ; will likely resume losartan on discharge    # Afib  - C/w eliquis, metoprolol  - f/u with PCP and cardiology    # DM2 - uncontrolled in setting of steroids  - had an episode of hypoglycemia yesterday but was asymptomatic; FSG increased again today. Endo f/u appreciated. Plan to d/c patient on tapering doses of NPH with steroid taper. Once off steroids patient should resume metformin  - f/u with PCP    # HTN  - c/w metoprolol (increase dose for rate control); resume losartan as CHRISTA resolving  - monitoring BP  - f/u with PCP and cardiology    # CAD  - Resume ASA, statin, BB    D/c home. F/u with PCP, cardiology, and pulmonolgy as outpatient.  Time spent ~35 mins

## 2023-11-16 NOTE — PROVIDER CONTACT NOTE (CRITICAL VALUE NOTIFICATION) - BACKGROUND
31 y/o female came in for worsening URI symptoms, found to have influenza A. Patient also found to be hypocalcemic.

## 2023-11-16 NOTE — PROGRESS NOTE ADULT - PROBLEM SELECTOR PLAN 12
Activity: ambulate as tolerated   Bowel reg: miralax   Consultants: none    Diet: consistent carb diet  DVT ppx: AC  Dispo: pending clinical improvement  Directive: Full code Activity: ambulate as tolerated   Bowel reg: miralax   Consultants: none    Diet: consistent carb diet  DVT ppx: AC  Dispo: d/c home   Directive: Full code

## 2023-11-16 NOTE — PROGRESS NOTE ADULT - REASON FOR ADMISSION
COPD/Asthma exacerbation

## 2023-11-16 NOTE — PROGRESS NOTE ADULT - PROBLEM SELECTOR PLAN 6
A1C 8.7%  On discharge previously he required lantus 40 admelog 28 for steroid induced hyperglycemia and just recently had been taken off of insulin (long acting one month ago and short acting 6 months ago due to hypoglycemia)    Transitioned to NC, advanced diet to carb consistent diet   Lantus 20u   Lispro 5u before meals   ISS q6hr  FSq6hr    Endo consult for home insulin recommendations A1C 8.7%  On discharge previously he required lantus 40 admelog 28 for steroid induced hyperglycemia and just recently had been taken off of insulin (long acting one month ago and short acting 6 months ago due to hypoglycemia)    Transitioned to NC, advanced diet to carb consistent diet   appreciate endo recommendations for insulin: Lantus 28u, Lispro 14u before meals   ISS q6hr  FSq6hr    Endo consult for home insulin recommendations

## 2023-11-17 LAB
CULTURE RESULTS: SIGNIFICANT CHANGE UP
SPECIMEN SOURCE: SIGNIFICANT CHANGE UP

## 2023-12-04 ENCOUNTER — NON-APPOINTMENT (OUTPATIENT)
Age: 68
End: 2023-12-04

## 2023-12-04 ENCOUNTER — APPOINTMENT (OUTPATIENT)
Dept: ELECTROPHYSIOLOGY | Facility: CLINIC | Age: 68
End: 2023-12-04
Payer: MEDICAID

## 2023-12-04 VITALS
OXYGEN SATURATION: 98 % | SYSTOLIC BLOOD PRESSURE: 105 MMHG | DIASTOLIC BLOOD PRESSURE: 59 MMHG | HEART RATE: 54 BPM | HEIGHT: 63 IN | BODY MASS INDEX: 25.69 KG/M2 | TEMPERATURE: 98 F | WEIGHT: 145 LBS

## 2023-12-04 DIAGNOSIS — R06.09 OTHER FORMS OF DYSPNEA: ICD-10-CM

## 2023-12-04 DIAGNOSIS — I42.8 OTHER CARDIOMYOPATHIES: ICD-10-CM

## 2023-12-04 PROCEDURE — 93000 ELECTROCARDIOGRAM COMPLETE: CPT

## 2023-12-04 PROCEDURE — 99215 OFFICE O/P EST HI 40 MIN: CPT

## 2023-12-04 RX ORDER — CARVEDILOL 6.25 MG/1
6.25 TABLET, FILM COATED ORAL TWICE DAILY
Qty: 180 | Refills: 3 | Status: ACTIVE | COMMUNITY
Start: 2023-12-04

## 2023-12-04 RX ORDER — APIXABAN 5 MG/1
5 TABLET, FILM COATED ORAL
Qty: 90 | Refills: 3 | Status: DISCONTINUED | COMMUNITY
Start: 2023-02-22 | End: 2023-12-04

## 2023-12-04 RX ORDER — APIXABAN 5 MG/1
5 TABLET, FILM COATED ORAL
Qty: 60 | Refills: 5 | Status: ACTIVE | COMMUNITY
Start: 2023-12-04 | End: 1900-01-01

## 2023-12-04 RX ORDER — SACUBITRIL AND VALSARTAN 24; 26 MG/1; MG/1
24-26 TABLET, FILM COATED ORAL TWICE DAILY
Qty: 60 | Refills: 3 | Status: ACTIVE | COMMUNITY
Start: 2023-12-04

## 2023-12-04 RX ORDER — SPIRONOLACTONE 25 MG/1
25 TABLET ORAL DAILY
Qty: 30 | Refills: 2 | Status: ACTIVE | COMMUNITY
Start: 2023-12-04

## 2023-12-13 ENCOUNTER — APPOINTMENT (OUTPATIENT)
Dept: PULMONOLOGY | Facility: CLINIC | Age: 68
End: 2023-12-13

## 2023-12-27 ENCOUNTER — OUTPATIENT (OUTPATIENT)
Dept: OUTPATIENT SERVICES | Facility: HOSPITAL | Age: 68
LOS: 1 days | End: 2023-12-27

## 2023-12-27 VITALS
RESPIRATION RATE: 15 BRPM | HEIGHT: 63.5 IN | OXYGEN SATURATION: 96 % | DIASTOLIC BLOOD PRESSURE: 64 MMHG | SYSTOLIC BLOOD PRESSURE: 101 MMHG | TEMPERATURE: 98 F | HEART RATE: 80 BPM | WEIGHT: 139.99 LBS

## 2023-12-27 DIAGNOSIS — I10 ESSENTIAL (PRIMARY) HYPERTENSION: ICD-10-CM

## 2023-12-27 DIAGNOSIS — I48.19 OTHER PERSISTENT ATRIAL FIBRILLATION: ICD-10-CM

## 2023-12-27 DIAGNOSIS — I48.91 UNSPECIFIED ATRIAL FIBRILLATION: ICD-10-CM

## 2023-12-27 DIAGNOSIS — E11.9 TYPE 2 DIABETES MELLITUS WITHOUT COMPLICATIONS: ICD-10-CM

## 2023-12-27 LAB
A1C WITH ESTIMATED AVERAGE GLUCOSE RESULT: 9.1 % — HIGH (ref 4–5.6)
A1C WITH ESTIMATED AVERAGE GLUCOSE RESULT: 9.1 % — HIGH (ref 4–5.6)
ALBUMIN SERPL ELPH-MCNC: 3.6 G/DL — SIGNIFICANT CHANGE UP (ref 3.3–5)
ALBUMIN SERPL ELPH-MCNC: 3.6 G/DL — SIGNIFICANT CHANGE UP (ref 3.3–5)
ALP SERPL-CCNC: 133 U/L — HIGH (ref 40–120)
ALP SERPL-CCNC: 133 U/L — HIGH (ref 40–120)
ALT FLD-CCNC: 13 U/L — SIGNIFICANT CHANGE UP (ref 4–41)
ALT FLD-CCNC: 13 U/L — SIGNIFICANT CHANGE UP (ref 4–41)
ANION GAP SERPL CALC-SCNC: 12 MMOL/L — SIGNIFICANT CHANGE UP (ref 7–14)
ANION GAP SERPL CALC-SCNC: 12 MMOL/L — SIGNIFICANT CHANGE UP (ref 7–14)
AST SERPL-CCNC: 13 U/L — SIGNIFICANT CHANGE UP (ref 4–40)
AST SERPL-CCNC: 13 U/L — SIGNIFICANT CHANGE UP (ref 4–40)
BILIRUB SERPL-MCNC: 0.2 MG/DL — SIGNIFICANT CHANGE UP (ref 0.2–1.2)
BILIRUB SERPL-MCNC: 0.2 MG/DL — SIGNIFICANT CHANGE UP (ref 0.2–1.2)
BLD GP AB SCN SERPL QL: NEGATIVE — SIGNIFICANT CHANGE UP
BLD GP AB SCN SERPL QL: NEGATIVE — SIGNIFICANT CHANGE UP
BUN SERPL-MCNC: 22 MG/DL — SIGNIFICANT CHANGE UP (ref 7–23)
BUN SERPL-MCNC: 22 MG/DL — SIGNIFICANT CHANGE UP (ref 7–23)
CALCIUM SERPL-MCNC: 9.1 MG/DL — SIGNIFICANT CHANGE UP (ref 8.4–10.5)
CALCIUM SERPL-MCNC: 9.1 MG/DL — SIGNIFICANT CHANGE UP (ref 8.4–10.5)
CHLORIDE SERPL-SCNC: 101 MMOL/L — SIGNIFICANT CHANGE UP (ref 98–107)
CHLORIDE SERPL-SCNC: 101 MMOL/L — SIGNIFICANT CHANGE UP (ref 98–107)
CO2 SERPL-SCNC: 24 MMOL/L — SIGNIFICANT CHANGE UP (ref 22–31)
CO2 SERPL-SCNC: 24 MMOL/L — SIGNIFICANT CHANGE UP (ref 22–31)
CREAT SERPL-MCNC: 1.32 MG/DL — HIGH (ref 0.5–1.3)
CREAT SERPL-MCNC: 1.32 MG/DL — HIGH (ref 0.5–1.3)
EGFR: 58 ML/MIN/1.73M2 — LOW
EGFR: 58 ML/MIN/1.73M2 — LOW
ESTIMATED AVERAGE GLUCOSE: 214 — SIGNIFICANT CHANGE UP
ESTIMATED AVERAGE GLUCOSE: 214 — SIGNIFICANT CHANGE UP
GLUCOSE SERPL-MCNC: 241 MG/DL — HIGH (ref 70–99)
GLUCOSE SERPL-MCNC: 241 MG/DL — HIGH (ref 70–99)
HCT VFR BLD CALC: 40.7 % — SIGNIFICANT CHANGE UP (ref 39–50)
HCT VFR BLD CALC: 40.7 % — SIGNIFICANT CHANGE UP (ref 39–50)
HGB BLD-MCNC: 13.9 G/DL — SIGNIFICANT CHANGE UP (ref 13–17)
HGB BLD-MCNC: 13.9 G/DL — SIGNIFICANT CHANGE UP (ref 13–17)
MCHC RBC-ENTMCNC: 28.9 PG — SIGNIFICANT CHANGE UP (ref 27–34)
MCHC RBC-ENTMCNC: 28.9 PG — SIGNIFICANT CHANGE UP (ref 27–34)
MCHC RBC-ENTMCNC: 34.2 GM/DL — SIGNIFICANT CHANGE UP (ref 32–36)
MCHC RBC-ENTMCNC: 34.2 GM/DL — SIGNIFICANT CHANGE UP (ref 32–36)
MCV RBC AUTO: 84.6 FL — SIGNIFICANT CHANGE UP (ref 80–100)
MCV RBC AUTO: 84.6 FL — SIGNIFICANT CHANGE UP (ref 80–100)
NRBC # BLD: 0 /100 WBCS — SIGNIFICANT CHANGE UP (ref 0–0)
NRBC # BLD: 0 /100 WBCS — SIGNIFICANT CHANGE UP (ref 0–0)
NRBC # FLD: 0 K/UL — SIGNIFICANT CHANGE UP (ref 0–0)
NRBC # FLD: 0 K/UL — SIGNIFICANT CHANGE UP (ref 0–0)
PLATELET # BLD AUTO: 306 K/UL — SIGNIFICANT CHANGE UP (ref 150–400)
PLATELET # BLD AUTO: 306 K/UL — SIGNIFICANT CHANGE UP (ref 150–400)
POTASSIUM SERPL-MCNC: 4.6 MMOL/L — SIGNIFICANT CHANGE UP (ref 3.5–5.3)
POTASSIUM SERPL-MCNC: 4.6 MMOL/L — SIGNIFICANT CHANGE UP (ref 3.5–5.3)
POTASSIUM SERPL-SCNC: 4.6 MMOL/L — SIGNIFICANT CHANGE UP (ref 3.5–5.3)
POTASSIUM SERPL-SCNC: 4.6 MMOL/L — SIGNIFICANT CHANGE UP (ref 3.5–5.3)
PROT SERPL-MCNC: 7.5 G/DL — SIGNIFICANT CHANGE UP (ref 6–8.3)
PROT SERPL-MCNC: 7.5 G/DL — SIGNIFICANT CHANGE UP (ref 6–8.3)
RBC # BLD: 4.81 M/UL — SIGNIFICANT CHANGE UP (ref 4.2–5.8)
RBC # BLD: 4.81 M/UL — SIGNIFICANT CHANGE UP (ref 4.2–5.8)
RBC # FLD: 16.2 % — HIGH (ref 10.3–14.5)
RBC # FLD: 16.2 % — HIGH (ref 10.3–14.5)
RH IG SCN BLD-IMP: POSITIVE — SIGNIFICANT CHANGE UP
SODIUM SERPL-SCNC: 137 MMOL/L — SIGNIFICANT CHANGE UP (ref 135–145)
SODIUM SERPL-SCNC: 137 MMOL/L — SIGNIFICANT CHANGE UP (ref 135–145)
WBC # BLD: 13.58 K/UL — HIGH (ref 3.8–10.5)
WBC # BLD: 13.58 K/UL — HIGH (ref 3.8–10.5)
WBC # FLD AUTO: 13.58 K/UL — HIGH (ref 3.8–10.5)
WBC # FLD AUTO: 13.58 K/UL — HIGH (ref 3.8–10.5)

## 2023-12-27 RX ORDER — LOSARTAN POTASSIUM 100 MG/1
1 TABLET, FILM COATED ORAL
Refills: 0 | DISCHARGE

## 2023-12-27 RX ORDER — ERGOCALCIFEROL 1.25 MG/1
0 CAPSULE ORAL
Refills: 0 | DISCHARGE

## 2023-12-27 RX ORDER — HYDROCHLOROTHIAZIDE 25 MG
1 TABLET ORAL
Refills: 0 | DISCHARGE

## 2023-12-27 RX ORDER — ALBUTEROL 90 UG/1
2 AEROSOL, METERED ORAL
Refills: 0 | DISCHARGE

## 2023-12-27 RX ORDER — METFORMIN HYDROCHLORIDE 850 MG/1
1 TABLET ORAL
Refills: 0 | DISCHARGE

## 2023-12-27 RX ORDER — TAMSULOSIN HYDROCHLORIDE 0.4 MG/1
1 CAPSULE ORAL
Refills: 0 | DISCHARGE

## 2023-12-27 RX ORDER — PREGABALIN 225 MG/1
1 CAPSULE ORAL
Refills: 0 | DISCHARGE

## 2023-12-27 RX ORDER — BUDESONIDE AND FORMOTEROL FUMARATE DIHYDRATE 160; 4.5 UG/1; UG/1
2 AEROSOL RESPIRATORY (INHALATION)
Refills: 0 | DISCHARGE

## 2023-12-27 RX ORDER — FOLIC ACID 0.8 MG
1 TABLET ORAL
Refills: 0 | DISCHARGE

## 2023-12-27 RX ORDER — METOCLOPRAMIDE HCL 10 MG
1 TABLET ORAL
Refills: 0 | DISCHARGE

## 2023-12-27 NOTE — H&P PST ADULT - NSANTHOSAYNRD_GEN_A_CORE
No. KEARA screening performed.  STOP BANG Legend: 0-2 = LOW Risk; 3-4 = INTERMEDIATE Risk; 5-8 = HIGH Risk

## 2023-12-27 NOTE — H&P PST ADULT - BP NONINVASIVE DIASTOLIC (MM HG)
Anticoagulation Management    Discussed INR home monitoring program with Carri Mckeon reviewing:      Elibigility requirements: >= 3 months of anticoagulation therapy, indication for chronic anticoagulation and order from provider    Required testing frequency (q1-2 weeks)    Home meters, testing supplies, meter training, and reporting of INR results done through an outside company. Patient would be contacted by home monitoring company to review insurance coverage with home monitoring company prior to enrolling.    [employer] would continue to receive and manage INR results.    Home monitoring application may take several weeks and must continue to follow up with recommended INR monitoring in clinic until receives monitor and training completed.     Home monitoring terms reviewed with patient      Patient agrees to frequency of testing as directed by referring provider ( weekly or biweekly) Yes    Testing to be performed during business hours of [facility] Yes    Patient agrees they have the skill (or a designated caregiver) necessary to perform the self test Yes    Patient agrees to report all INR results to INR home monitoring company Yes    Patient agrees to have additional INR test in clinic if a home result is critical Yes    Patient agrees to use a [employer] approved service provider and device for home monitoring Yes    [facility]    Referring provider: Dr. Hawkins    Referring providers Clinic Fax number [phone]    Carri Mckeon is interested home INR monitoring and requests order be submitted.      
We tried to get Carri a home meter back in July, however because she has out of state medicaid [employer] are unable to service.   
Writer notifies patient that due to insurance the home meter isn't a possibility for the patient.   
62

## 2023-12-27 NOTE — H&P PST ADULT - PROBLEM SELECTOR PLAN 2
Patient instructed to hold all cardiac meds on the morning of procedure as instructed by EP correspondence.

## 2023-12-27 NOTE — H&P PST ADULT - WEIGHT IN KG
Pt called back and spoke with [place]  Appointment scheduled for tomorrow 5/13 with James Carballo  63.5

## 2023-12-27 NOTE — H&P PST ADULT - NSICDXFAMILYHX_GEN_ALL_CORE_FT
FAMILY HISTORY:  Mother  Still living? No  Family history of asthma in mother, Age at diagnosis: Age Unknown    Sibling  Still living? Yes, Estimated age: Age Unknown  FH: HTN (hypertension), Age at diagnosis: Age Unknown  FHx: diabetes mellitus, Age at diagnosis: Age Unknown    Child  Still living? Yes, Estimated age: Age Unknown  FH: HTN (hypertension), Age at diagnosis: Age Unknown  FHx: diabetes mellitus, Age at diagnosis: Age Unknown

## 2023-12-27 NOTE — H&P PST ADULT - PROBLEM SELECTOR PLAN 1
Scheduled for Afib Ablation with [employer] on 01/02/24.  Preop instructions provided and patient's son verbalizes understanding.  Labs done and results pending.  Hibiclens provided with instructions and was signed by patient. Teach-back method was utilized to assess patient's understanding. Patient verbalized understanding.  Patient instructed to hold Eliquis on the morning of procedure as instructed by EP correspondence.

## 2023-12-27 NOTE — H&P PST ADULT - NSICDXPASTMEDICALHX_GEN_ALL_CORE_FT
PAST MEDICAL HISTORY:  Afib     Asthma     CAD (coronary artery disease)     CHF (congestive heart failure)     Essential hypertension     Kidney stone     Type 2 diabetes mellitus      PAST MEDICAL HISTORY:  Afib     Aortic valve regurgitation     Asthma     CAD (coronary artery disease)     CHF (congestive heart failure)     Essential hypertension     Kidney stone     Type 2 diabetes mellitus

## 2023-12-27 NOTE — H&P PST ADULT - HISTORY OF PRESENT ILLNESS
68 yr old male accompanied by son with multiple comorbidities including T2DM, HTN, Asthma, CAD s/p coronary stent 2014 presents for preop evaluation with dx of Afib in January 2023.  Patient is now scheduled for Afib Ablation with [employer] tentatively on 01/02/24.  Of note this patient was hospitalized  x 2 November 2023 [facility] and Ru with c/o dyspnea and with workup patient was dx with Aortic Valve Regurgitation and heart failure.

## 2024-01-02 ENCOUNTER — OUTPATIENT (OUTPATIENT)
Dept: OUTPATIENT SERVICES | Facility: HOSPITAL | Age: 69
LOS: 1 days | Discharge: HOME | End: 2024-01-02
Payer: MEDICAID

## 2024-01-02 DIAGNOSIS — I48.19 OTHER PERSISTENT ATRIAL FIBRILLATION: ICD-10-CM

## 2024-01-02 LAB
GLUCOSE BLDC GLUCOMTR-MCNC: 172 MG/DL — HIGH (ref 70–99)
GLUCOSE BLDC GLUCOMTR-MCNC: 172 MG/DL — HIGH (ref 70–99)
GLUCOSE BLDC GLUCOMTR-MCNC: 207 MG/DL — HIGH (ref 70–99)
GLUCOSE BLDC GLUCOMTR-MCNC: 207 MG/DL — HIGH (ref 70–99)

## 2024-01-02 PROCEDURE — 92623 DX ALY AUD OI SND PRCSR EACH: CPT

## 2024-01-02 PROCEDURE — 93655 ICAR CATH ABLTJ DSCRT ARRHYT: CPT

## 2024-01-02 PROCEDURE — 93010 ELECTROCARDIOGRAM REPORT: CPT | Mod: 76

## 2024-01-02 PROCEDURE — 93657 TX L/R ATRIAL FIB ADDL: CPT

## 2024-01-02 PROCEDURE — 93656 COMPRE EP EVAL ABLTJ ATR FIB: CPT

## 2024-01-02 RX ORDER — SODIUM CHLORIDE 9 MG/ML
3 INJECTION INTRAMUSCULAR; INTRAVENOUS; SUBCUTANEOUS EVERY 8 HOURS
Refills: 0 | Status: DISCONTINUED | OUTPATIENT
Start: 2024-01-02 | End: 2024-01-16

## 2024-01-02 RX ORDER — PANTOPRAZOLE SODIUM 20 MG/1
1 TABLET, DELAYED RELEASE ORAL
Qty: 30 | Refills: 0
Start: 2024-01-02 | End: 2024-01-31

## 2024-01-02 NOTE — CHART NOTE - NSCHARTNOTEFT_GEN_A_CORE
ELECTROPHYSIOLOGY    Son at bedside. Patient requesting his son translate.  Patient s/p atrial fibrillation ablation.   Tolerated the procedure well. No immediate complications.   Vital signs stable. Telemetry: NSR.  Right groin without bleeding, ecchymosis or hematoma.  Post procedure ablation teaching done.   Written instructions and contact information provided.   All questions answered.   He has a follow-up appointment with Dr. Carlisle on Monday 2/5/24 at 2:00pm  4th [facility] [phone]. ELECTROPHYSIOLOGY    Son at bedside. Patient requesting his son translate.  Patient s/p atrial fibrillation ablation.   Tolerated the procedure well. No immediate complications.   Vital signs stable. Telemetry: NSR.  Right groin without bleeding, ecchymosis or hematoma.  Post procedure ablation teaching done.   Written instructions and contact information provided.   All questions answered.   He has a follow-up appointment with Dr. Carlisle on Monday 2/5/24 at 2:00pm  4th [facility] [phone].

## 2024-01-02 NOTE — CHART NOTE - NSCHARTNOTEFT_GEN_A_CORE
Interventional Recovery Suite PA Note    Patient is a 68 year old male with PMH of HTN, HLD, DM2, CAD, s/p stent 2014,VHD, CHF, Asthma arrived for elective  A-fib  Ablation.   The process of the procedures along with the risk and benefits for the procedure were explained in detail which included but not limited to bleeding, infection, stroke,  esophageal injury, complication from anesthesia, cardiac tamponade, heart block requiring pacemaker, intubation, and death. Patient expressed understanding an all questions were answered.   PST H&P appreciated;  Patient seen and examined today; physical exam unremarkable.   Medication reconciliation reviewed, the patient did not take any meds today morning. Last dose of Eliquis 5 mg was taken yesterday morning on 1/1/24 at 9am.  The patient denies chest pain, SOB, palpitations, dizziness, presyncope, syncope,  headache, visual disturbances, PE, DVT, KEAAR, abdominal pain, N/V/D/C, hematochezia, melena, dysuria, hematuria, fever, chills, ulcers. Interventional Recovery Suite PA Note    Patient is a 68 year old male with PMH of HTN, HLD, DM2, CAD, s/p stent 2014,VHD, CHF, Asthma arrived for elective  A-fib  Ablation.   The process of the procedures along with the risk and benefits for the procedure were explained in detail which included but not limited to bleeding, infection, stroke,  esophageal injury, complication from anesthesia, cardiac tamponade, heart block requiring pacemaker, intubation, and death. Patient expressed understanding an all questions were answered.   PST H&P appreciated;  Patient seen and examined today; physical exam unremarkable.   Medication reconciliation reviewed, the patient did not take any meds today morning. Last dose of Eliquis 5 mg was taken yesterday morning on 1/1/24 at 9am.  The patient denies chest pain, SOB, palpitations, dizziness, presyncope, syncope,  headache, visual disturbances, PE, DVT, KEARA, abdominal pain, N/V/D/C, hematochezia, melena, dysuria, hematuria, fever, chills, ulcers.

## 2024-01-23 NOTE — H&P PST ADULT - NSANTHSNORERD_ENT_A_CORE
HISTORY OF PRESENT ILLNESS:  Patient is a 44 year old female here for the following reasons:    Sinus congestion  Acute bacterial sinusitis  Yadira presents to the clinic today with complaints of sinus pain/pressure onset couple of weeks ago. Patient denies any associated nausea, vomiting, sore throat, dysphagia, rash, cough, shortness of breath, or chest pain. Patient does report a history of sinus issues. Reports exposure to close contacts with similar symptoms.  Denies any changes to appetite or fluid intake. Has tried over-the-counter nasal decongestants and allergy medication.    Assessment/plan:  Uncontrolled, suspect that symptoms are bacterial in etiology.  Education regarding etiology and treatment of acute illness provided.  Appropriate use of over-the-counter medications including analgesics, decongestants, intranasal steroids, antihistamines, nasal saline rinses, and expectorants reviewed.  Recommend patient maintain adequate hydration.  Patient may also use prescription medications to help manage symptoms.  Medication education provided.  Patient instructed to follow-up if symptoms fail to improve as expected or sooner if symptoms worsen.  Symptoms that warrant urgent and emergent evaluation reviewed.  Patient verbalizes understanding and is in agreement with this plan of care.     New medication:  - amoxicillin (AMOXIL) 875 MG tablet; Take 1 tablet by mouth in the morning and 1 tablet in the evening. Do all this for 10 days.  Dispense: 20 tablet; Refill: 0    Chronic pain of left knee  Patient presents ambulatory with reports chronic left knee pain ongoing for years.  Pain worsened on Sunday and is currently similar to her baseline discomfort.  She suspects her pain is worse due to her current living situation.  She would move furniture over the weekend and had worsening back and leg pain, but declines any acute injury.  Pain is described as a “knot of pain” but has since resolved.  Pain worsens with  walking and movement.  She has been taking meloxicam, nortriptyline, Lyrica, and Flexeril as directed.  Currently she rates her pain as “average.”  She feels worn out due to her chronic pain.   She has requesting an x-ray today.  No other associated symptoms, aggravating or alleviating factors identified or treatments tried.      Assessment/plan:  Stable, waiting results of x-ray.  Recommend patient follow up with Orthopedics.  Referral placed.  Continue meloxicam and other medications as previously directed.  Discussed rest, icing, compression, and elevation.  Recommend patient follow up with pain management for continued chronic pain.  Potential diagnostic and treatment options discussed in the setting of shared decision-making.  Symptoms that warrant re-evaluation, urgent or emergent evaluation discussed.  Patient verbalizes understanding and is in agreement with this plan of care.   - XR KNEE 3 VW BILATERAL; Future  - SERVICE TO ORTHOPEDICS    Refill:   - meloxicam (MOBIC) 15 MG tablet; Take 1 tablet by mouth daily.  Dispense: 90 tablet; Refill: 1    PHYSICAL EXAM:  Visit Vitals  /86 (BP Location: RUE - Right upper extremity, Patient Position: Sitting, Cuff Size: Large Adult)   Resp 16   Wt 125.6 kg (277 lb)   BMI 47.55 kg/m²     Patient is an alert, appropriate, pleasant female in no apparent distress.  Constitutional:  Well developed, well nourished in no apparent distress, nontoxic appearance.   HENT:  Atraumatic. Ears viewed with otoscope, TM (tympanic membranes) intact bilaterally, without effusion or erythema. Nasal turbinates boggy and pale with clear nasal discharge. Moist oral mucous membranes, unremarkable oropharynx. No sinus tenderness with palpation.  Neck: ROM (range of motion) WNL (within normal limits),  supple, nontender. No lymphadenopathy palpated.   Respiratory:  No respiratory distress, normal breath sounds, no rales, wheezing or rhonchi noted.  Cardiovascular:  Normal rate, normal  rhythm. No murmurs noted.   Gastrointestinal:  Soft, nontender, nondistended, normal bowel sounds, no hepatosplenomegaly.  Neurologic:  Alert & oriented x 3.  Cranial nerves 3-12 normal, normal motor function, normal sensory function, no focal deficits noted.  Musculoskeletal: Full but tender range of motion noted. No patellar subluxation appreciated. Lachman test and anterior and posterior drawer test is unremarkable. Valgus and varus stress tests without appreciated abnormality. Enrique's test without any crepitus or tenderness. Patellar tendon reflexes 1+ and symmetric bilaterally. Sensation is intact throughout bilateral lower extremities. Pedal pulses are 2+, full, equal bilaterally.   Psychiatric:  Speech and behavior appropriate.  Patient cooperative.    All questions were answered to the best of my ability.  We reviewed signs and symptoms to monitor after this visit and when to notify me of any concerns or changes.  The patient agrees with the diagnosis and treatment plan of the aforementioned diagnoses, and denies any questions or concerns.      Return if symptoms worsen or fail to improve.    MONROE Hurtado       Yes

## 2024-02-05 ENCOUNTER — NON-APPOINTMENT (OUTPATIENT)
Age: 69
End: 2024-02-05

## 2024-02-05 ENCOUNTER — APPOINTMENT (OUTPATIENT)
Dept: ELECTROPHYSIOLOGY | Facility: CLINIC | Age: 69
End: 2024-02-05
Payer: MEDICAID

## 2024-02-05 VITALS
BODY MASS INDEX: 25.69 KG/M2 | HEART RATE: 85 BPM | WEIGHT: 145 LBS | HEIGHT: 63 IN | OXYGEN SATURATION: 99 % | DIASTOLIC BLOOD PRESSURE: 73 MMHG | SYSTOLIC BLOOD PRESSURE: 128 MMHG

## 2024-02-05 PROBLEM — I35.1 NONRHEUMATIC AORTIC (VALVE) INSUFFICIENCY: Chronic | Status: ACTIVE | Noted: 2023-12-27

## 2024-02-05 PROBLEM — I50.9 HEART FAILURE, UNSPECIFIED: Chronic | Status: ACTIVE | Noted: 2023-12-27

## 2024-02-05 PROBLEM — I48.91 UNSPECIFIED ATRIAL FIBRILLATION: Chronic | Status: ACTIVE | Noted: 2023-12-27

## 2024-02-05 PROBLEM — N20.0 CALCULUS OF KIDNEY: Chronic | Status: ACTIVE | Noted: 2023-12-27

## 2024-02-05 PROCEDURE — 93000 ELECTROCARDIOGRAM COMPLETE: CPT

## 2024-02-05 PROCEDURE — 99214 OFFICE O/P EST MOD 30 MIN: CPT

## 2024-02-05 NOTE — CARDIOLOGY SUMMARY
[de-identified] : 11/14/2023: CONCLUSIONS:    1. Left ventricular systolic function is moderately decreased with an ejection fraction visually estimated at 40 to 45 %.  2. Normal right ventricular cavity size, wall thickness, and reduced systolic function.  3. The left atrium is mildly dilated in size.  4. The right atrium is mildly dilated in size.  5. The aortic valve appears trileaflet with reduced systolic excursion. There is calcification of the aortic valve leaflets. There is mild aortic stenosis. The peak transaortic velocity is 1.88 m/s, peak transaortic gradient is 14.1 mmHg and mean transaortic gradient is 8.0 mmHg with an LVOT/aortic valve VTI ratio of 0.52. The aortic valve area is estimated at 1.63 cm? by the continuity equation. There is moderate aortic regurgitation.  6. No pericardial effusion seen.  7. The inferior vena cava is normal in size (normal <2.1cm) with normal inspiratory collapse (normal >50%) consistent with normal right atrial pressure (~3, range 0-5mmHg).   1/10/23:  Mitral Valve: Mitral annular calcification, otherwise  normal mitral valve. Mild mitral regurgitation. Aortic Root: Normal aortic root. Aortic Valve: Calcified trileaflet aortic valve with normal opening. Mild-moderate aortic regurgitation. Left Atrium: Normal left atrium. LA volume index = 28 cc/m2. Left Ventricle: Normal left ventricular systolic function. No segmental wall motion abnormalities. Eccentric left ventricular hypertrophy (dilated left ventricle with normal relative wall thickness). EF 55% Right Heart: Normal right atrium. Normal right ventricular size and function. Normal tricuspid valve. Minimal tricuspid regurgitation. Normal pulmonic valve. Minimal pulmonic regurgitation. Pericardium/PleuraNormal pericardium with no pericardial effusion. Hemodynamic: Estimated right ventricular systolic pressure equals 30 mm Hg, assuming right atrial pressure equals 10 mm Hg, consistent with normal pulmonary pressures.

## 2024-02-05 NOTE — HISTORY OF PRESENT ILLNESS
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 Admits doing well post ablation with improved dyspnea. Denies chest pain, palpitations, SOB, syncope or near syncope. Right groin without pain, swelling, or bruising.

## 2024-02-05 NOTE — DISCUSSION/SUMMARY
[EKG obtained to assist in diagnosis and management of assessed problem(s)] : EKG obtained to assist in diagnosis and management of assessed problem(s) [FreeTextEntry1] : Impression:  1. Persistent afib: now s/p PVI, RA septal APC, PWI, and CTI ablation on 1/2/2024. EKG performed today to assess for presence of conduction disease and reveals NSR. Resume Eliquis for thromboembolic prophylaxis as prescribed. Recommend repeat ECHO to reassess for valvular dz (concern for need for CABG/AVR at [place]) now that NSR is restored.   2. HTN: Encouraged heart healthy diet, sodium restriction, and weight loss. Continue regular f/u with Cardiologist for further HTN management.  Will have repeat ECHO and RTO for f/u in 3 months.

## 2024-04-02 NOTE — PROGRESS NOTE ADULT - NS ATTEND AMEND GEN_ALL_CORE FT
Conjunctivae and eyelids appear normal,  Sclerae : White without injection 
67 year old man with CAD s/p PCI circa 2014 in [facility], HTN, Asthma, and DM who presents with sepsis secondary to influenza and a-fib with RVR. Patient with pAF at this time. Continue rate control with BB. Pauses not concerning at this time and not a contraindication to BB uptitration while being monitored. Agree with AC. Will likely benefit from ablation long term, which can be done as outpatient.
67 year old man with CAD s/p PCI circa 2014 in [facility], HTN, Asthma, and DM who presents with sepsis secondary to influenza and a-fib with RVR. Patient with pAF at this time. Continue rate control with BB. Pauses not concerning at this time. Continue rate control and AC. plan for outpatient follow up and ablation.
67 year old man with CAD s/p PCI circa 2014 in [facility], HTN, Asthma, and DM who presents with sepsis secondary to influenza and a-fib with RVR. Patient with pAF at this time. Continue rate control with BB. Pauses not concerning at this time and not a contraindication to BB uptitration while being monitored. Agree with AC. Will likely benefit from ablation long term, which can be done as outpatient.

## 2024-04-08 ENCOUNTER — APPOINTMENT (OUTPATIENT)
Dept: CV DIAGNOSITCS | Facility: HOSPITAL | Age: 69
End: 2024-04-08

## 2024-05-06 ENCOUNTER — OUTPATIENT (OUTPATIENT)
Dept: OUTPATIENT SERVICES | Facility: HOSPITAL | Age: 69
LOS: 1 days | End: 2024-05-06
Payer: MEDICAID

## 2024-05-06 ENCOUNTER — APPOINTMENT (OUTPATIENT)
Dept: CV DIAGNOSITCS | Facility: HOSPITAL | Age: 69
End: 2024-05-06

## 2024-05-06 ENCOUNTER — APPOINTMENT (OUTPATIENT)
Dept: ELECTROPHYSIOLOGY | Facility: CLINIC | Age: 69
End: 2024-05-06
Payer: MEDICAID

## 2024-05-06 ENCOUNTER — RESULT REVIEW (OUTPATIENT)
Age: 69
End: 2024-05-06

## 2024-05-06 ENCOUNTER — NON-APPOINTMENT (OUTPATIENT)
Age: 69
End: 2024-05-06

## 2024-05-06 VITALS
BODY MASS INDEX: 27.46 KG/M2 | WEIGHT: 155 LBS | HEIGHT: 63 IN | SYSTOLIC BLOOD PRESSURE: 118 MMHG | DIASTOLIC BLOOD PRESSURE: 72 MMHG | OXYGEN SATURATION: 97 % | HEART RATE: 91 BPM

## 2024-05-06 DIAGNOSIS — Z98.890 OTHER SPECIFIED POSTPROCEDURAL STATES: ICD-10-CM

## 2024-05-06 DIAGNOSIS — I10 ESSENTIAL (PRIMARY) HYPERTENSION: ICD-10-CM

## 2024-05-06 DIAGNOSIS — M79.0 RHEUMATISM, UNSPECIFIED: ICD-10-CM

## 2024-05-06 DIAGNOSIS — I48.19 OTHER PERSISTENT ATRIAL FIBRILLATION: ICD-10-CM

## 2024-05-06 DIAGNOSIS — Z86.79 OTHER SPECIFIED POSTPROCEDURAL STATES: ICD-10-CM

## 2024-05-06 PROCEDURE — 93306 TTE W/DOPPLER COMPLETE: CPT | Mod: 26

## 2024-05-06 PROCEDURE — 93000 ELECTROCARDIOGRAM COMPLETE: CPT

## 2024-05-06 PROCEDURE — 99214 OFFICE O/P EST MOD 30 MIN: CPT

## 2024-05-06 NOTE — CARDIOLOGY SUMMARY
[de-identified] : 11/14/2023: CONCLUSIONS:    1. Left ventricular systolic function is moderately decreased with an ejection fraction visually estimated at 40 to 45 %.  2. Normal right ventricular cavity size, wall thickness, and reduced systolic function.  3. The left atrium is mildly dilated in size.  4. The right atrium is mildly dilated in size.  5. The aortic valve appears trileaflet with reduced systolic excursion. There is calcification of the aortic valve leaflets. There is mild aortic stenosis. The peak transaortic velocity is 1.88 m/s, peak transaortic gradient is 14.1 mmHg and mean transaortic gradient is 8.0 mmHg with an LVOT/aortic valve VTI ratio of 0.52. The aortic valve area is estimated at 1.63 cm? by the continuity equation. There is moderate aortic regurgitation.  6. No pericardial effusion seen.  7. The inferior vena cava is normal in size (normal <2.1cm) with normal inspiratory collapse (normal >50%) consistent with normal right atrial pressure (~3, range 0-5mmHg).   1/10/23:  Mitral Valve: Mitral annular calcification, otherwise  normal mitral valve. Mild mitral regurgitation. Aortic Root: Normal aortic root. Aortic Valve: Calcified trileaflet aortic valve with normal opening. Mild-moderate aortic regurgitation. Left Atrium: Normal left atrium. LA volume index = 28 cc/m2. Left Ventricle: Normal left ventricular systolic function. No segmental wall motion abnormalities. Eccentric left ventricular hypertrophy (dilated left ventricle with normal relative wall thickness). EF 55% Right Heart: Normal right atrium. Normal right ventricular size and function. Normal tricuspid valve. Minimal tricuspid regurgitation. Normal pulmonic valve. Minimal pulmonic regurgitation. Pericardium/PleuraNormal pericardium with no pericardial effusion. Hemodynamic: Estimated right ventricular systolic pressure equals 30 mm Hg, assuming right atrial pressure equals 10 mm Hg, consistent with normal pulmonary pressures.

## 2024-05-06 NOTE — DISCUSSION/SUMMARY
[EKG obtained to assist in diagnosis and management of assessed problem(s)] : EKG obtained to assist in diagnosis and management of assessed problem(s) [FreeTextEntry1] : Impression:  1. Persistent afib: now s/p PVI, RA septal APC, PWI, and CTI ablation on 1/2/2024. EKG performed today to assess for presence of conduction disease and reveals NSR. Resume Eliquis for thromboembolic prophylaxis as prescribed.   2. HTN: Encouraged heart healthy diet, sodium restriction, and weight loss. Continue regular f/u with Cardiologist for further HTN management.  Continue f/u with Cardiologist and RTO for f/u in 6 months.

## 2024-05-09 ENCOUNTER — INPATIENT (INPATIENT)
Facility: HOSPITAL | Age: 69
LOS: 1 days | Discharge: HOME | End: 2024-05-11
Attending: STUDENT IN AN ORGANIZED HEALTH CARE EDUCATION/TRAINING PROGRAM | Admitting: STUDENT IN AN ORGANIZED HEALTH CARE EDUCATION/TRAINING PROGRAM
Payer: MEDICAID

## 2024-05-09 VITALS
RESPIRATION RATE: 20 BRPM | SYSTOLIC BLOOD PRESSURE: 149 MMHG | OXYGEN SATURATION: 99 % | DIASTOLIC BLOOD PRESSURE: 73 MMHG | HEART RATE: 79 BPM | TEMPERATURE: 98 F

## 2024-05-09 DIAGNOSIS — N17.9 ACUTE KIDNEY FAILURE, UNSPECIFIED: ICD-10-CM

## 2024-05-09 LAB
A1C WITH ESTIMATED AVERAGE GLUCOSE RESULT: 8.5 % — HIGH (ref 4–5.6)
ALBUMIN SERPL ELPH-MCNC: 4.3 G/DL — SIGNIFICANT CHANGE UP (ref 3.3–5)
ALP SERPL-CCNC: 109 U/L — SIGNIFICANT CHANGE UP (ref 40–120)
ALT FLD-CCNC: 12 U/L — SIGNIFICANT CHANGE UP (ref 4–41)
ANION GAP SERPL CALC-SCNC: 12 MMOL/L — SIGNIFICANT CHANGE UP (ref 7–14)
ANION GAP SERPL CALC-SCNC: 13 MMOL/L — SIGNIFICANT CHANGE UP (ref 7–14)
ANISOCYTOSIS BLD QL: SLIGHT — SIGNIFICANT CHANGE UP
APPEARANCE UR: CLEAR — SIGNIFICANT CHANGE UP
APTT BLD: 45.5 SEC — HIGH (ref 24.5–35.6)
AST SERPL-CCNC: 14 U/L — SIGNIFICANT CHANGE UP (ref 4–40)
B-OH-BUTYR SERPL-SCNC: <0 MMOL/L — SIGNIFICANT CHANGE UP (ref 0–0.4)
BACTERIA # UR AUTO: NEGATIVE /HPF — SIGNIFICANT CHANGE UP
BASE EXCESS BLDV CALC-SCNC: -4.1 MMOL/L — LOW (ref -2–3)
BASE EXCESS BLDV CALC-SCNC: -5.6 MMOL/L — LOW (ref -2–3)
BASE EXCESS BLDV CALC-SCNC: -7 MMOL/L — LOW (ref -2–3)
BASE EXCESS BLDV CALC-SCNC: -8.3 MMOL/L — LOW (ref -2–3)
BASOPHILS # BLD AUTO: 0 K/UL — SIGNIFICANT CHANGE UP (ref 0–0.2)
BASOPHILS NFR BLD AUTO: 0 % — SIGNIFICANT CHANGE UP (ref 0–2)
BILIRUB SERPL-MCNC: 0.3 MG/DL — SIGNIFICANT CHANGE UP (ref 0.2–1.2)
BILIRUB UR-MCNC: NEGATIVE — SIGNIFICANT CHANGE UP
BLD GP AB SCN SERPL QL: NEGATIVE — SIGNIFICANT CHANGE UP
BLOOD GAS VENOUS COMPREHENSIVE RESULT: SIGNIFICANT CHANGE UP
BUN SERPL-MCNC: 23 MG/DL — SIGNIFICANT CHANGE UP (ref 7–23)
BUN SERPL-MCNC: 24 MG/DL — HIGH (ref 7–23)
BURR CELLS BLD QL SMEAR: PRESENT — SIGNIFICANT CHANGE UP
CALCIUM SERPL-MCNC: 8.1 MG/DL — LOW (ref 8.4–10.5)
CALCIUM SERPL-MCNC: 9 MG/DL — SIGNIFICANT CHANGE UP (ref 8.4–10.5)
CAST: 1 /LPF — SIGNIFICANT CHANGE UP (ref 0–4)
CHLORIDE BLDV-SCNC: 103 MMOL/L — SIGNIFICANT CHANGE UP (ref 96–108)
CHLORIDE BLDV-SCNC: 104 MMOL/L — SIGNIFICANT CHANGE UP (ref 96–108)
CHLORIDE BLDV-SCNC: 107 MMOL/L — SIGNIFICANT CHANGE UP (ref 96–108)
CHLORIDE BLDV-SCNC: 110 MMOL/L — HIGH (ref 96–108)
CHLORIDE SERPL-SCNC: 105 MMOL/L — SIGNIFICANT CHANGE UP (ref 98–107)
CHLORIDE SERPL-SCNC: 107 MMOL/L — SIGNIFICANT CHANGE UP (ref 98–107)
CO2 BLDV-SCNC: 22.1 MMOL/L — SIGNIFICANT CHANGE UP (ref 22–26)
CO2 BLDV-SCNC: 22.5 MMOL/L — SIGNIFICANT CHANGE UP (ref 22–26)
CO2 BLDV-SCNC: 23 MMOL/L — SIGNIFICANT CHANGE UP (ref 22–26)
CO2 BLDV-SCNC: 25.4 MMOL/L — SIGNIFICANT CHANGE UP (ref 22–26)
CO2 SERPL-SCNC: 18 MMOL/L — LOW (ref 22–31)
CO2 SERPL-SCNC: 21 MMOL/L — LOW (ref 22–31)
COLOR SPEC: YELLOW — SIGNIFICANT CHANGE UP
CREAT SERPL-MCNC: 1.58 MG/DL — HIGH (ref 0.5–1.3)
CREAT SERPL-MCNC: 1.75 MG/DL — HIGH (ref 0.5–1.3)
CRP SERPL-MCNC: 8.5 MG/L — HIGH
DIFF PNL FLD: ABNORMAL
EGFR: 41 ML/MIN/1.73M2 — LOW
EGFR: 46 ML/MIN/1.73M2 — LOW
ELLIPTOCYTES BLD QL SMEAR: SLIGHT — SIGNIFICANT CHANGE UP
EOSINOPHIL # BLD AUTO: 2.49 K/UL — HIGH (ref 0–0.5)
EOSINOPHIL NFR BLD AUTO: 20 % — HIGH (ref 0–6)
ERYTHROCYTE [SEDIMENTATION RATE] IN BLOOD: 58 MM/HR — HIGH (ref 1–15)
ESTIMATED AVERAGE GLUCOSE: 197 — SIGNIFICANT CHANGE UP
GAS PNL BLDV: 134 MMOL/L — LOW (ref 136–145)
GAS PNL BLDV: 134 MMOL/L — LOW (ref 136–145)
GAS PNL BLDV: 136 MMOL/L — SIGNIFICANT CHANGE UP (ref 136–145)
GAS PNL BLDV: 138 MMOL/L — SIGNIFICANT CHANGE UP (ref 136–145)
GAS PNL BLDV: SIGNIFICANT CHANGE UP
GAS PNL BLDV: SIGNIFICANT CHANGE UP
GIANT PLATELETS BLD QL SMEAR: PRESENT — SIGNIFICANT CHANGE UP
GLUCOSE BLDC GLUCOMTR-MCNC: 339 MG/DL — HIGH (ref 70–99)
GLUCOSE BLDV-MCNC: 173 MG/DL — HIGH (ref 70–99)
GLUCOSE BLDV-MCNC: 176 MG/DL — HIGH (ref 70–99)
GLUCOSE BLDV-MCNC: 249 MG/DL — HIGH (ref 70–99)
GLUCOSE BLDV-MCNC: 296 MG/DL — HIGH (ref 70–99)
GLUCOSE SERPL-MCNC: 157 MG/DL — HIGH (ref 70–99)
GLUCOSE SERPL-MCNC: 223 MG/DL — HIGH (ref 70–99)
GLUCOSE UR QL: >=1000 MG/DL
HCO3 BLDV-SCNC: 20 MMOL/L — LOW (ref 22–29)
HCO3 BLDV-SCNC: 21 MMOL/L — LOW (ref 22–29)
HCO3 BLDV-SCNC: 22 MMOL/L — SIGNIFICANT CHANGE UP (ref 22–29)
HCO3 BLDV-SCNC: 24 MMOL/L — SIGNIFICANT CHANGE UP (ref 22–29)
HCT VFR BLD CALC: 36 % — LOW (ref 39–50)
HCT VFR BLDA CALC: 33 % — LOW (ref 39–51)
HCT VFR BLDA CALC: 33 % — LOW (ref 39–51)
HCT VFR BLDA CALC: 36 % — LOW (ref 39–51)
HCT VFR BLDA CALC: 37 % — LOW (ref 39–51)
HGB BLD CALC-MCNC: 10.9 G/DL — LOW (ref 12.6–17.4)
HGB BLD CALC-MCNC: 11.1 G/DL — LOW (ref 12.6–17.4)
HGB BLD CALC-MCNC: 12 G/DL — LOW (ref 12.6–17.4)
HGB BLD CALC-MCNC: 12.4 G/DL — LOW (ref 12.6–17.4)
HGB BLD-MCNC: 12.3 G/DL — LOW (ref 13–17)
IANC: 6.5 K/UL — SIGNIFICANT CHANGE UP (ref 1.8–7.4)
INR BLD: 1.19 RATIO — HIGH (ref 0.85–1.18)
KETONES UR-MCNC: NEGATIVE MG/DL — SIGNIFICANT CHANGE UP
LACTATE BLDV-MCNC: 1.4 MMOL/L — SIGNIFICANT CHANGE UP (ref 0.5–2)
LACTATE BLDV-MCNC: 1.6 MMOL/L — SIGNIFICANT CHANGE UP (ref 0.5–2)
LACTATE BLDV-MCNC: 2.1 MMOL/L — HIGH (ref 0.5–2)
LACTATE BLDV-MCNC: 2.6 MMOL/L — HIGH (ref 0.5–2)
LEUKOCYTE ESTERASE UR-ACNC: NEGATIVE — SIGNIFICANT CHANGE UP
LYMPHOCYTES # BLD AUTO: 1.81 K/UL — SIGNIFICANT CHANGE UP (ref 1–3.3)
LYMPHOCYTES # BLD AUTO: 14.6 % — SIGNIFICANT CHANGE UP (ref 13–44)
MACROCYTES BLD QL: SLIGHT — SIGNIFICANT CHANGE UP
MAGNESIUM SERPL-MCNC: 2 MG/DL — SIGNIFICANT CHANGE UP (ref 1.6–2.6)
MANUAL SMEAR VERIFICATION: SIGNIFICANT CHANGE UP
MCHC RBC-ENTMCNC: 30.4 PG — SIGNIFICANT CHANGE UP (ref 27–34)
MCHC RBC-ENTMCNC: 34.2 GM/DL — SIGNIFICANT CHANGE UP (ref 32–36)
MCV RBC AUTO: 88.9 FL — SIGNIFICANT CHANGE UP (ref 80–100)
MONOCYTES # BLD AUTO: 0.91 K/UL — HIGH (ref 0–0.9)
MONOCYTES NFR BLD AUTO: 7.3 % — SIGNIFICANT CHANGE UP (ref 2–14)
NEUTROPHILS # BLD AUTO: 6.66 K/UL — SIGNIFICANT CHANGE UP (ref 1.8–7.4)
NEUTROPHILS NFR BLD AUTO: 50 % — SIGNIFICANT CHANGE UP (ref 43–77)
NEUTS BAND # BLD: 3.6 % — SIGNIFICANT CHANGE UP (ref 0–6)
NITRITE UR-MCNC: NEGATIVE — SIGNIFICANT CHANGE UP
OVALOCYTES BLD QL SMEAR: SLIGHT — SIGNIFICANT CHANGE UP
PCO2 BLDV: 48 MMHG — SIGNIFICANT CHANGE UP (ref 42–55)
PCO2 BLDV: 51 MMHG — SIGNIFICANT CHANGE UP (ref 42–55)
PCO2 BLDV: 54 MMHG — SIGNIFICANT CHANGE UP (ref 42–55)
PCO2 BLDV: 56 MMHG — HIGH (ref 42–55)
PH BLDV: 7.17 — LOW (ref 7.32–7.43)
PH BLDV: 7.22 — LOW (ref 7.32–7.43)
PH BLDV: 7.25 — LOW (ref 7.32–7.43)
PH BLDV: 7.26 — LOW (ref 7.32–7.43)
PH UR: 6.5 — SIGNIFICANT CHANGE UP (ref 5–8)
PHOSPHATE SERPL-MCNC: 3.5 MG/DL — SIGNIFICANT CHANGE UP (ref 2.5–4.5)
PLAT MORPH BLD: NORMAL — SIGNIFICANT CHANGE UP
PLATELET # BLD AUTO: 260 K/UL — SIGNIFICANT CHANGE UP (ref 150–400)
PLATELET COUNT - ESTIMATE: NORMAL — SIGNIFICANT CHANGE UP
PO2 BLDV: 22 MMHG — LOW (ref 25–45)
PO2 BLDV: 25 MMHG — SIGNIFICANT CHANGE UP (ref 25–45)
PO2 BLDV: 25 MMHG — SIGNIFICANT CHANGE UP (ref 25–45)
PO2 BLDV: 32 MMHG — SIGNIFICANT CHANGE UP (ref 25–45)
POIKILOCYTOSIS BLD QL AUTO: SIGNIFICANT CHANGE UP
POLYCHROMASIA BLD QL SMEAR: SLIGHT — SIGNIFICANT CHANGE UP
POTASSIUM BLDV-SCNC: 4.7 MMOL/L — SIGNIFICANT CHANGE UP (ref 3.5–5.1)
POTASSIUM BLDV-SCNC: 4.7 MMOL/L — SIGNIFICANT CHANGE UP (ref 3.5–5.1)
POTASSIUM BLDV-SCNC: 4.9 MMOL/L — SIGNIFICANT CHANGE UP (ref 3.5–5.1)
POTASSIUM BLDV-SCNC: 4.9 MMOL/L — SIGNIFICANT CHANGE UP (ref 3.5–5.1)
POTASSIUM SERPL-MCNC: 4.1 MMOL/L — SIGNIFICANT CHANGE UP (ref 3.5–5.3)
POTASSIUM SERPL-MCNC: 4.6 MMOL/L — SIGNIFICANT CHANGE UP (ref 3.5–5.3)
POTASSIUM SERPL-SCNC: 4.1 MMOL/L — SIGNIFICANT CHANGE UP (ref 3.5–5.3)
POTASSIUM SERPL-SCNC: 4.6 MMOL/L — SIGNIFICANT CHANGE UP (ref 3.5–5.3)
PROT SERPL-MCNC: 7.7 G/DL — SIGNIFICANT CHANGE UP (ref 6–8.3)
PROT UR-MCNC: SIGNIFICANT CHANGE UP MG/DL
PROTHROM AB SERPL-ACNC: 13.3 SEC — HIGH (ref 9.5–13)
RBC # BLD: 4.05 M/UL — LOW (ref 4.2–5.8)
RBC # FLD: 14 % — SIGNIFICANT CHANGE UP (ref 10.3–14.5)
RBC BLD AUTO: ABNORMAL
RBC CASTS # UR COMP ASSIST: 0 /HPF — SIGNIFICANT CHANGE UP (ref 0–4)
RH IG SCN BLD-IMP: POSITIVE — SIGNIFICANT CHANGE UP
SAO2 % BLDV: 24.2 % — LOW (ref 67–88)
SAO2 % BLDV: 28.8 % — LOW (ref 67–88)
SAO2 % BLDV: 34.4 % — LOW (ref 67–88)
SAO2 % BLDV: 43.7 % — LOW (ref 67–88)
SODIUM SERPL-SCNC: 138 MMOL/L — SIGNIFICANT CHANGE UP (ref 135–145)
SODIUM SERPL-SCNC: 138 MMOL/L — SIGNIFICANT CHANGE UP (ref 135–145)
SP GR SPEC: 1.02 — SIGNIFICANT CHANGE UP (ref 1–1.03)
SQUAMOUS # UR AUTO: 0 /HPF — SIGNIFICANT CHANGE UP (ref 0–5)
UROBILINOGEN FLD QL: 0.2 MG/DL — SIGNIFICANT CHANGE UP (ref 0.2–1)
VARIANT LYMPHS # BLD: 4.5 % — SIGNIFICANT CHANGE UP (ref 0–6)
WBC # BLD: 12.43 K/UL — HIGH (ref 3.8–10.5)
WBC # FLD AUTO: 12.43 K/UL — HIGH (ref 3.8–10.5)
WBC UR QL: 0 /HPF — SIGNIFICANT CHANGE UP (ref 0–5)

## 2024-05-09 PROCEDURE — 71046 X-RAY EXAM CHEST 2 VIEWS: CPT | Mod: 26

## 2024-05-09 PROCEDURE — 99285 EMERGENCY DEPT VISIT HI MDM: CPT

## 2024-05-09 PROCEDURE — 73630 X-RAY EXAM OF FOOT: CPT | Mod: 26,LT

## 2024-05-09 PROCEDURE — 93010 ELECTROCARDIOGRAM REPORT: CPT

## 2024-05-09 PROCEDURE — 99223 1ST HOSP IP/OBS HIGH 75: CPT

## 2024-05-09 RX ORDER — SODIUM BICARBONATE 1 MEQ/ML
650 SYRINGE (ML) INTRAVENOUS ONCE
Refills: 0 | Status: COMPLETED | OUTPATIENT
Start: 2024-05-09 | End: 2024-05-09

## 2024-05-09 RX ORDER — INSULIN LISPRO 100/ML
VIAL (ML) SUBCUTANEOUS
Refills: 0 | Status: DISCONTINUED | OUTPATIENT
Start: 2024-05-09 | End: 2024-05-11

## 2024-05-09 RX ORDER — GLUCAGON INJECTION, SOLUTION 0.5 MG/.1ML
1 INJECTION, SOLUTION SUBCUTANEOUS ONCE
Refills: 0 | Status: DISCONTINUED | OUTPATIENT
Start: 2024-05-09 | End: 2024-05-11

## 2024-05-09 RX ORDER — INSULIN LISPRO 100/ML
VIAL (ML) SUBCUTANEOUS AT BEDTIME
Refills: 0 | Status: DISCONTINUED | OUTPATIENT
Start: 2024-05-09 | End: 2024-05-11

## 2024-05-09 RX ORDER — SODIUM CHLORIDE 9 MG/ML
1000 INJECTION, SOLUTION INTRAVENOUS
Refills: 0 | Status: DISCONTINUED | OUTPATIENT
Start: 2024-05-09 | End: 2024-05-11

## 2024-05-09 RX ORDER — ACETAMINOPHEN 500 MG
650 TABLET ORAL ONCE
Refills: 0 | Status: COMPLETED | OUTPATIENT
Start: 2024-05-09 | End: 2024-05-09

## 2024-05-09 RX ORDER — SODIUM CHLORIDE 9 MG/ML
1000 INJECTION INTRAMUSCULAR; INTRAVENOUS; SUBCUTANEOUS ONCE
Refills: 0 | Status: COMPLETED | OUTPATIENT
Start: 2024-05-09 | End: 2024-05-09

## 2024-05-09 RX ORDER — ACETAMINOPHEN 500 MG
1000 TABLET ORAL ONCE
Refills: 0 | Status: COMPLETED | OUTPATIENT
Start: 2024-05-09 | End: 2024-05-09

## 2024-05-09 RX ORDER — OXYCODONE HYDROCHLORIDE 5 MG/1
5 TABLET ORAL EVERY 6 HOURS
Refills: 0 | Status: DISCONTINUED | OUTPATIENT
Start: 2024-05-09 | End: 2024-05-11

## 2024-05-09 RX ORDER — DEXTROSE 50 % IN WATER 50 %
25 SYRINGE (ML) INTRAVENOUS ONCE
Refills: 0 | Status: DISCONTINUED | OUTPATIENT
Start: 2024-05-09 | End: 2024-05-11

## 2024-05-09 RX ORDER — VANCOMYCIN HCL 1 G
1000 VIAL (EA) INTRAVENOUS ONCE
Refills: 0 | Status: COMPLETED | OUTPATIENT
Start: 2024-05-09 | End: 2024-05-09

## 2024-05-09 RX ORDER — PIPERACILLIN AND TAZOBACTAM 4; .5 G/20ML; G/20ML
3.38 INJECTION, POWDER, LYOPHILIZED, FOR SOLUTION INTRAVENOUS ONCE
Refills: 0 | Status: COMPLETED | OUTPATIENT
Start: 2024-05-09 | End: 2024-05-09

## 2024-05-09 RX ORDER — DEXTROSE 10 % IN WATER 10 %
125 INTRAVENOUS SOLUTION INTRAVENOUS ONCE
Refills: 0 | Status: DISCONTINUED | OUTPATIENT
Start: 2024-05-09 | End: 2024-05-11

## 2024-05-09 RX ORDER — IPRATROPIUM/ALBUTEROL SULFATE 18-103MCG
3 AEROSOL WITH ADAPTER (GRAM) INHALATION ONCE
Refills: 0 | Status: COMPLETED | OUTPATIENT
Start: 2024-05-09 | End: 2024-05-09

## 2024-05-09 RX ORDER — ACETAMINOPHEN 500 MG
650 TABLET ORAL EVERY 6 HOURS
Refills: 0 | Status: DISCONTINUED | OUTPATIENT
Start: 2024-05-09 | End: 2024-05-11

## 2024-05-09 RX ORDER — DEXTROSE 50 % IN WATER 50 %
12.5 SYRINGE (ML) INTRAVENOUS ONCE
Refills: 0 | Status: DISCONTINUED | OUTPATIENT
Start: 2024-05-09 | End: 2024-05-11

## 2024-05-09 RX ORDER — DEXTROSE 50 % IN WATER 50 %
15 SYRINGE (ML) INTRAVENOUS ONCE
Refills: 0 | Status: DISCONTINUED | OUTPATIENT
Start: 2024-05-09 | End: 2024-05-11

## 2024-05-09 RX ORDER — TETANUS TOXOID, REDUCED DIPHTHERIA TOXOID AND ACELLULAR PERTUSSIS VACCINE, ADSORBED 5; 2.5; 8; 8; 2.5 [IU]/.5ML; [IU]/.5ML; UG/.5ML; UG/.5ML; UG/.5ML
0.5 SUSPENSION INTRAMUSCULAR ONCE
Refills: 0 | Status: COMPLETED | OUTPATIENT
Start: 2024-05-09 | End: 2024-05-09

## 2024-05-09 RX ADMIN — SODIUM CHLORIDE 1000 MILLILITER(S): 9 INJECTION INTRAMUSCULAR; INTRAVENOUS; SUBCUTANEOUS at 16:35

## 2024-05-09 RX ADMIN — Medication 250 MILLIGRAM(S): at 19:00

## 2024-05-09 RX ADMIN — Medication 650 MILLIGRAM(S): at 20:45

## 2024-05-09 RX ADMIN — Medication 400 MILLIGRAM(S): at 14:15

## 2024-05-09 RX ADMIN — SODIUM CHLORIDE 1000 MILLILITER(S): 9 INJECTION INTRAMUSCULAR; INTRAVENOUS; SUBCUTANEOUS at 17:34

## 2024-05-09 RX ADMIN — Medication 3 MILLILITER(S): at 14:22

## 2024-05-09 RX ADMIN — TETANUS TOXOID, REDUCED DIPHTHERIA TOXOID AND ACELLULAR PERTUSSIS VACCINE, ADSORBED 0.5 MILLILITER(S): 5; 2.5; 8; 8; 2.5 SUSPENSION INTRAMUSCULAR at 17:39

## 2024-05-09 RX ADMIN — PIPERACILLIN AND TAZOBACTAM 200 GRAM(S): 4; .5 INJECTION, POWDER, LYOPHILIZED, FOR SOLUTION INTRAVENOUS at 18:31

## 2024-05-09 NOTE — ED PROVIDER NOTE - IV ALTEPLASE EXCL ABS HIDDEN
-- DO NOT REPLY / DO NOT REPLY ALL --  -- Message is from the [facility]--    Transfer to RN      PATIENT HUNG UP    Chief Complaint:  Bump/growth in scalp (no head injuries)    Caller Information       Type Contact Phone    04/19/2022 06:08 PM CDT Phone (Incoming) Rubia Gee (Self) [phone] (V)          Provider Name:  Dr. Jerome CALIX Practice Site Name: [facility]    Alternative Phone Number: n/a    
FYI to PCP  
Pt. Says she is fine and ok  Now doesn't need to speak to a Nurse. Advised to CB if anything else needed.      Reason for Disposition  • Caller has cancelled the call before the first contact    Protocols used: NO CONTACT OR DUPLICATE CONTACT CALL-A-AH      Future Appointments   Date Time Provider Department Center   5/5/2022 10:00 AM Elizabeth Alexander MD [id number] [facility]   6/6/2022  2:40 PM Monica Rendon MD [id number] [address] [facility]   6/15/2022 11:30 AM Katia Cano MD [id number]     
show

## 2024-05-09 NOTE — ED ADULT NURSE REASSESSMENT NOTE - NS ED NURSE REASSESS COMMENT FT1
Report received from day RNEssence. Roslyn speaking with family member at bedside translating as per patient's request. Antibiotic running at change of shift. Sodium bicarb administered as ordered, see MAR. Fingerstick obtained. Pt refusing cardiac monitoring and repeat labwork, MD Oberly made aware. Pt well appearing sitting up in stretcher HOB elevated. Respirations even and unlabored with equal chest rise. No acute distress noted. Left foot appears wrapped in gauze, clean with no drainage noted. Pt offering no complaints, denies CP, SOB, nausea, vomiting, headache, lightheadedness, dizziness, fever or chills. Bed in lowest position, in vision of nurses station in spot 24A, wheels locked, fall precautions in effect, safety maintained. Awaiting bed assignment as per admission orders. Report received from day RNEssence. Roslyn speaking with family member at bedside translating as per patient's request. Antibiotic running at change of shift. Sodium bicarb administered as ordered, see MAR. Fingerstick obtained. Pt refusing cardiac monitoring and repeat labwork, patient and family educated on importance, still refusing. MD Oberly made aware. Pt well appearing sitting up in stretcher HOB elevated. Respirations even and unlabored with equal chest rise. No acute distress noted. Left foot appears wrapped in gauze, clean with no drainage noted. Pt offering no complaints, denies CP, SOB, nausea, vomiting, headache, lightheadedness, dizziness, fever or chills. Bed in lowest position, in vision of nurses station in spot 24A, wheels locked, fall precautions in effect, safety maintained. Awaiting bed assignment as per admission orders.

## 2024-05-09 NOTE — H&P ADULT - HISTORY OF PRESENT ILLNESS
70 yo man, Lao speaking, with history of afib s/p ablation (1/2/24), on Eliquis, CAD s/p stent ( 70 yo man, Vietnamese speaking, with history of afib s/p ablation (1/2/24), on Eliquis, CAD s/p stent (2014), COPD/asthma, HTN, type 2 DM (not on insulin) c/b gastroparesis, CKD, and nephrolithiasis presents with 1 month of worsening left heel pain. Pt recalls that about a year ago, he had been walking and stepped on an object that might have penetrated and embedded into his left heel. Pt didn't feel any ongoing pain after the episode and disregarded the injury, but starting 1 month ago, pt began having intermittent left heel pain that would worsen with weight bearing on the left foot. Pt saw his podiatrist last week, and during the visit, an attempt was made by the podiatrist to extract the possible foreign object from pt's left heel. However, the attempt was unsuccessful, with pt being unable to withstand the pain and no foreign object retrieved. Pt, however, was prescribed a course of amoxicillin, which pt started this past Tuesday. 68 yo man, Estonian speaking, with history of afib s/p ablation (1/2/24), on Eliquis, CAD s/p stent (2014), COPD/asthma, HTN, type 2 DM (not on insulin) c/b gastroparesis, CKD, and nephrolithiasis presents with 1 month of worsening left heel pain. Pt recalls that about a year ago, he had been walking and stepped on an object that might have penetrated and embedded into his left heel. Pt didn't feel any ongoing pain after the episode and disregarded the injury, but starting 1 month ago, pt began having intermittent left heel pain that would worsen with weight bearing on the left foot. Pt saw his podiatrist last week, and during the visit, an attempt was made by the podiatrist to extract the possible foreign object from pt's left heel. However, the attempt was aborted, with pt being unable to withstand the pain from the procedure. Pt was prescribed a course of amoxicillin, which pt started this past Tuesday. Despite taking the amoxicillin, pt's left heel pain intensified Wednesday night, becoming 10/10 in severity and prompting pt's visit to the ED today. Pt had also been taking PO Tylenol as needed for the pain, no recent NSAID use. Pt denies any associated fever or chills. No chest pain, shortness of breath, palpitations, cough, abdominal pain, nausea, vomiting, diarrhea, constipation, melena, BRBPR, or urinary symptoms.     In the ED,  T 97.7-98, HR 75-79, -149/62-73, RR 16-20, SpO2 % RA.  Pt seen by Podiatry.  70 yo man, Mongolian speaking, with history of afib s/p ablation (1/2/24), on Eliquis, HFrecEF (EF previously 40-45% in 11/2023, now 57% on 5/6/24), CAD s/p stent (2014), COPD/asthma, HTN, type 2 DM (not on insulin) c/b gastroparesis, CKD, and nephrolithiasis presents with 1 month of worsening left heel pain. Pt recalls that about a year ago, he had been walking and stepped on an object that might have penetrated and embedded into his left heel. Pt didn't feel any ongoing pain after the episode and disregarded the injury, but starting 1 month ago, pt began having intermittent left heel pain that would worsen with weight bearing on the left foot. Pt saw his podiatrist last week, and during the visit, an attempt was made by the podiatrist to extract the possible foreign object from pt's left heel. However, the attempt was aborted, with pt being unable to withstand the pain from the procedure. Pt was prescribed a course of amoxicillin, which pt started this past Tuesday. Despite taking the amoxicillin, pt's left heel pain intensified Wednesday night, becoming 10/10 in severity and prompting pt's visit to the ED today. Pt had also been taking PO Tylenol as needed for the pain, no recent NSAID use. Pt denies any associated fever or chills. No chest pain, shortness of breath, palpitations, cough, abdominal pain, nausea, vomiting, diarrhea, constipation, melena, BRBPR, or urinary symptoms.     In the ED,  T 97.7-98, HR 75-79, -149/62-73, RR 16-20, SpO2 % RA.  Pt seen by Podiatry.

## 2024-05-09 NOTE — H&P ADULT - NSHPREVIEWOFSYSTEMS_GEN_ALL_CORE
Constitutional: No generalized weakness, fever, chills, or weight loss  Eyes: No visual changes, double vision, or eye pain  Ears, Nose, Mouth, Throat: No runny nose, sinus pain, ear pain, tinnitus, sore throat, dysphagia, or odynophagia  Cardiovascular: No chest pain, palpitations, or LE edema  Respiratory: No cough, wheezing, hemoptysis, or shortness of breath  Gastrointestinal: No abdominal pain, nausea/vomiting, diarrhea/constipation, hematemesis, melena, or BRBPR  Genitourinary: No dysuria, frequency, urgency, or hematuria  Musculoskeletal: +Left heel pain. No neck pain or back pain. No joint pain, swelling, or decreased ROM.  Skin: No pruritus or rashes  Neurologic: No syncope, seizures, headache, paresthesias, numbness, or limb weakness  Psychiatric: No depression, anxiety, difficulty concentrating, anhedonia, or lack of energy  Endocrine: No heat/cold intolerance, mood swings, sweats, polydipsia, or polyuria  Hematologic/lymphatic: No purpura, petechia, or prolonged or excessive bleeding after dental extraction / injury  Allergic/Immunologic: No anaphylaxis or allergic response to materials, foods, animals    Positives and pertinent negatives noted and all other systems negative.

## 2024-05-09 NOTE — ED ADULT TRIAGE NOTE - CHIEF COMPLAINT QUOTE
Patient complains of left foot wound since March that has not been healing. Patient also complains of increased pain and difficulty ambulating. Patient denies any numbness or tingling in foot. No redness, swelling or discharge noted in right foot at this time. Patient able to speak in clear sentences, respirations equal and unlabored on room air. Fingerstick: 388, pmhx: HTN, CAD, DM

## 2024-05-09 NOTE — H&P ADULT - NSICDXPASTMEDICALHX_GEN_ALL_CORE_FT
PAST MEDICAL HISTORY:  Afib     Aortic valve regurgitation     Asthma     CAD (coronary artery disease)     CHF (congestive heart failure)     Essential hypertension     Kidney stone     Type 2 diabetes mellitus

## 2024-05-09 NOTE — ED ADULT NURSE REASSESSMENT NOTE - NS ED NURSE REASSESS COMMENT FT1
Pt continues refusing repeat labwork and fingerstick. MD Jiménez at bedside made aware of refusals. Unit RN, Roque made aware. Vital signs obtained and charted. Family at bedside. Safety maintained. Pt continues refusing repeat labwork and fingerstick. MD Jiménez at bedside made aware of refusals. ACP ([id number]), provider Padmini made aware of refusal for VBG and fingerstick. Unit RN, Roque made aware. Vital signs obtained and charted. Family at bedside. Safety maintained.

## 2024-05-09 NOTE — ED PROVIDER NOTE - PHYSICAL EXAMINATION
Gen: AAOx3, non-toxic  Head: NCAT  HEENT: EOMI, oral mucosa moist, normal conjunctiva  Lung: CTAB, no respiratory distress, no wheezes/rhonchi/rales B/L,   CV: RRR, no murmurs, rubs or gallops  Abd: soft, NTND, no guarding, no CVA tenderness  MSK: no visible deformities  Neuro: No focal sensory or motor deficits  Skin: Warm, well perfused, small 1cm circumferential lesion with abrasion and skin tear with mild erythema, w no surrounding erythema or purulent discharge  Psych: normal affect.

## 2024-05-09 NOTE — H&P ADULT - PROBLEM SELECTOR PLAN 6
S/p stent (2014). On ASA 81 mg daily.   - C/w ASA 81 mg daily  - C/w atorvastatin 80 mg qHS  - C/w Coreg 6.25 mg BID

## 2024-05-09 NOTE — ED ADULT NURSE NOTE - OBJECTIVE STATEMENT
Patient came in with the complaints of wound on left heel. As per son at bedside, he took the patient to podiatrist last Monday and podiatrist found the foreign body in left heel and tried to remove but its too painful for the patient so podiatrist recommended him to come to the hospital. Son stated that he tried to remove it at home with no success. No other complaints. No distress noted. Awaiting for MD to see and further orders. Nursing care continues

## 2024-05-09 NOTE — ED ADULT NURSE NOTE - CAS DISCH BELONGINGS RETURNED
DATE OF PROCEDURE:  2017    SURGEON:  Mary Alice Elias M.D.    PLACE OF SURGERY:  [facility].    PREOPERATIVE DIAGNOSES:  1. Intrauterine pregnancy at 38 + 6 weeks gestational age.  2. History of 3 prior  sections.  3. Labor.  4. Desires permanent sterilization.    POSTOPERATIVE DIAGNOSES:  1. Intrauterine pregnancy at 38+ 6 weeks gestational age.  2. History of 3 prior  sections.  3. Labor.  4. Desires permanent sterilization.    PROCEDURES:  1. Repeat low transverse  section via Pfannenstiel incision.  2. Bilateral tubal ligation via modified [place] method.    ATTENDING:  Mary Alice Elias M.D.    ASSISTANT:  Nikia Villegas, PGY-2.    ANESTHESIA:  Spinal.    EBL:  500 mL.    IV FLUIDS:  2100 mL.    URINE OUTPUT:  100 mL.    SPECIMEN:  Cord segment for cord gases.    COMPLICATIONS:  None.    FINDINGS:  Male infant, cephalic presentation, Apgars 9 and 9.  Weight 3490 g.  Moderate adhesions in fascial layer.  Otherwise normal uterus, tubes, and  ovaries.    INDICATION AND CONSENT:  The patient is a 31-year-old G5, P3-0-1-3 at 38 + 6  weeks gestational age, who presents with vaginal spotting and concern for  leakage of fluid.  Since last night, the patient was complaining of contractions and was found to be gregorio uncomfortably on the monitor. Therefore, decision was made to proceed with repeat  section secondary to labor.  The patient has a history of 3 prior  sections and desires permanent sterilization, so decision was made to proceed with repeat   section and bilateral tubal ligation.  The patient understood the risks of   section include, but are not limited to visceral or vascular injury,  infection, blood loss, need for transfusion, prolonged hospitalization,  reoperation.  The patient stated understanding and desired to proceed.  All  questions were answered.    DESCRIPTION OF PROCEDURE:  The patient was  taken to the operating room where  spinal anesthesia was found be adequate.  2 g of Ancef were given for  infection prophylaxis.  She was prepared and draped in the dorsal supine  position with a leftward tilt.  A Pfannenstiel skin incision was made with a  scalpel.  Incision was carried down to the fascia with the scalpel.  The fascia  was incised and extended laterally.  The inferior aspect of the fascia was  grasped with Kocher clamps.  The underlying rectus muscle and pyramidalis were  dissected off sharply with Colby scissors.  In a similar fashion, the superior  aspect of the fascia was elevated with Kocher and the rectus muscle was  dissected off.  Hemostasis was achieved with the Bovie.  The rectus muscle was   in the midline down to the level of the pubic symphysis.  Preperitoneal fatty tissue was bluntly dissected to expose the peritoneum.  The  dense adhesions were taken down sharply in layers.  The peritoneum was found to  be free of adherent bowel and entered bluntly.  The peritoneal incision was  extended superiorly and inferiorly to the bladder reflection with good  visualization of bladder.  The bladder blade was inserted and the vesicouterine  peritoneum was identified.  Intraabdominal survey revealed scant clear  peritoneal fluid and the lower uterine segment.  The vesicouterine peritoneum was  opened with scissors and the bladder flap was developed.  The bladder blade was  repositioned to keep the bladder out of the operative field.  The lower uterine  segment was incised with a scalpel.  Amniotic sac was ruptured upon entry with  clear fluid.  The uterine incision was extended bluntly with lateral and upward  traction.  The fetus was in cephalic presentation.  The head was elevated out of  the pelvis with special attention paid to avoid using uterine incision as a  fulcrum.  Gentle fundal pressure was applied.  Once the head was brought into  the incision, the infant was delivered without  difficulty.  Mouth and nose were  suction with a bulb.  The cord was clamped and cut after delayed cord  clamping. The infant was handed off to the pediatrician.  IV oxytocin was  initiated to facilitate uterine contractions.  Placenta was delivered intact  with manual removal of the placenta.  The inside of the uterus was gently wiped  with lap sponge to assure complete removal of placental membranes.  The uterine  incision was closed with 1-0 chromic suture in a running locked fashion.  The  uterus, ovaries and tubes were found to be normal.  The blood clots and fluid  were wiped out of the abdomen and pelvis with moist laparotomy sponges.  The  uterine incision was reinspected and good hemostasis was noted.  The left fallopian  tube was identified and brought out of the incision with a [place] clamp.  The  tube was followed out to the fimbria.  The isthmic portion of fallopian tube was  grasped and elevated with a [place] clamp.  A 3 cm segment of fallopian tube was  ligated with a plain gut suture.  A 2nd plain gut suture was placed below the  original suture for additional security.  A window was then created in the mesosalpinx. The tube was excised and sent to pathology for confirmation.  The tube was found to be hemostatic and  then was returned to the abdominal cavity.  Attention was then made to the right  fallopian tube.  The right fallopian tube was ligated in a similar fashion, and  also found to be hemostatic.  The rectus muscle was closed with 2-0 chromic in a  vertical mattress.  The fascial layer was closed with 1-0 Vicryl suture.  The skin  was closed with a 3-0 Vicryl on a Santos needle in a subcuticular fashion.  Dermabond was then placed over the incision.  The patient tolerated the  procedure well.  All the counts were correct x2.  The patient was taken to the  recovery room in a stable condition.      Dr. Elias was present for the entire portion of procedure.      DATE AND TIME                        Mary Alice Elias M.D.    Dictated By :  Nikia Villegas MD    /MED-#[id number]  DD:  09/20/2017 19:19:51      DT:  09/20/2017 22:36:30    cc:  Mary Alice Elias M.D.        [facility]    REPORT OF OPERATION        PAULINO BEYER  MRN#: [id number]  ROOM: [id number]  [facility]#: [id number] Reportsperative Reports               Electronically Signed On 09/21/2017 11:16  __________________________________________________   NIKIA URBAN      Electronically Signed On 09/26/2017 19:15  __________________________________________________   MARY ALICE NIEVES     In pt's possession

## 2024-05-09 NOTE — ED ADULT NURSE NOTE - NS ED NURSE PATIENT LEFT UNIT TIME
Detail Level: Simple
Instructions: This plan will send the code FBSD to the PM system.  DO NOT or CHANGE the price.
Price (Do Not Change): 0.00
22:16

## 2024-05-09 NOTE — H&P ADULT - PROBLEM SELECTOR PLAN 8
BPs in acceptable range on admission. Pt on Coreg 6.25 mg BID, and based on Med Rec from 1/2/24, had been on PO Lasix 20 mg daily and spironolactone 25 mg daily. However, pt's son notes that pt is now no longer taking any diuretics.  - C/w Coreg 6.25 mg BID. Hold PO Lasix and spironolactone for now until pt's home medications confirmed in AM.  - Monitor VS q4hrs

## 2024-05-09 NOTE — H&P ADULT - PROBLEM SELECTOR PLAN 10
To get better and follow your care plan as instructed. - Med Rec pharmacist emailed, complete admission Med Rec in AM

## 2024-05-09 NOTE — CONSULT NOTE ADULT - ASSESSMENT
69M presents with L plantar heel wound to dermis  - Pt seen and evaluated with son bedside translating for entire consult   - Afebrile, WBC 12.43  - L foot posterior plantar heel wound to dermis with overlying eschar, no erythema, no drainage, no purulence, no fluctuance no palpable retained foreign body. R foot no open wounds or signs of infection.   - L foot XR: no gas, no OM, no FB  - No culture 2/2 no drainage, no signs of infection and likely skin contaminant   - Foot not likely source of leukocytosis   - Rec PO Augmentin 875 x10 days   - Dispense surgical shoe left foot   - Local wound care with betadine/ mupirocin and bandaid daily   - Stable for D/c from podiatry standpoint to f.u outpt with Dr Waterhouse   - Please call [phone] to make an appointment and f.u within 1 week   - Discussed with attending

## 2024-05-09 NOTE — H&P ADULT - PROBLEM SELECTOR PLAN 9
Pt on Metformin 500 mg BID and Jardiance 10 mg daily at home based on Med Rec from 1/2/24, with pt's son stating there have been no changes to pt's DM medications since then. A1c 8.5% on admission.  - Hold Metformin and Jardiance while inpatient  - Labs on admission with no evidence of DKA, with no AG and negative BHB  - Start moderate-dose ISS qAC TID and low-dose ISS qHS  - Check FS qAC TID and qHS

## 2024-05-09 NOTE — H&P ADULT - PROBLEM SELECTOR PLAN 1
Pt with 1 month of worsening left heel pain. On exam, left foot with plantar heel open wound to dermis, no signs of infection. ESR 58 and CRP 8.5. Pt afebrile, did not meet SIRS criteria. Pt with 1 month of worsening left heel pain. Xray of left foot with no evidence of osteomyelitis, gas, or foreign body. On exam, left foot with plantar heel open wound to dermis, no signs of infection. ESR 58 and CRP 8.5. Pt afebrile, did not meet SIRS criteria.  - Podiatry consulted on admission. Per discussion with Podiatry, no signs of infection but recommend antibiotics (i.e., Augmentin) for prophylaxis given presence of open wound. Start PO doxycycline 100 mg BID instead of Augmentin, as there is possibility of pt having AIN from recent amoxicillin use (plan as below for CHRISTA on CKD).  - Local wound care with betadine/mupirocin and Band-Aid daily  - F/u blood cxs  - Pain control with PO Tylenol PRN for mild to moderate pain and oxycodone 5 mg q6hrs PRN for severe pain  - Obtain MRI left foot w/wo contrast if pt spikes fever, pain persists/worsens, or ESR/CRP trend up  - Trend ESR and CRP, as both mildly elevated  - Fall risk protocol  - PT consult

## 2024-05-09 NOTE — H&P ADULT - PROBLEM SELECTOR PLAN 4
[id number] 5. S/p ablation on 1/2/24. On Eliquis 5 mg BID at home.  - C/w Eliquis 5 mg BID  - C/w Coreg 6.25 mg BID

## 2024-05-09 NOTE — ED ADULT NURSE REASSESSMENT NOTE - NS ED NURSE REASSESS COMMENT FT1
break coverage rn. received report from [employer]. pt A&Ox4, vitally stable. denies chest pain, SOB,n/v,headache, dizziness, numbness/tingling to hands/feet. endorsing pain to L heel wound. MD aware. awaiting scan results. safety maintained. resp even unlabored, abd soft, pedal pulse 2+ bilaterally. safety maintained

## 2024-05-09 NOTE — H&P ADULT - PROBLEM SELECTOR PLAN 3
WBC 12.43 on admission. Pt with history of chronic leukocytosis. Pt currently afebrile, so lower suspicion for acute infection as reason for leukocytosis, more likely reactive vs. hemoconcentration.   - Trend CBC with diff daily  - F/u blood cxs and urine cx  - C/w PO doxycycline 100 mg BID as above  - Pt with eosinophilia (20%). Pt with history of eosinophilia in 9/2023, found to have elevated IgE at that time. Possible referral to A&I as outpatient. WBC 12.43 on admission. Pt with history of chronic leukocytosis. Pt currently afebrile, so lower suspicion for acute infection as reason for leukocytosis, more likely reactive vs. hemoconcentration.   - Trend CBC with diff daily  - F/u blood cxs and urine cx  - C/w PO doxycycline 100 mg BID as above  - Pt with eosinophilia (20%). Can be from AIN, but pt also with history of eosinophilia in 9/2023, found to have elevated IgE at that time. Possible referral to A&I as outpatient.

## 2024-05-09 NOTE — ED PROVIDER NOTE - ATTENDING CONTRIBUTION TO CARE
Attending MD Aranda:  I performed a history and physical exam of the patient and discussed their management with the resident. I reviewed the resident's note and agree with the documented findings and plan of care. My medical decision making and observations are found above.

## 2024-05-09 NOTE — ED PROVIDER NOTE - CLINICAL SUMMARY MEDICAL DECISION MAKING FREE TEXT BOX
69-year-old male past medical history of CAD status post 1 stent, hypertension, hyperlipidemia, diabetes presents to the ED complaining of left heel pain for 1 month status post foreign body 1 year ago.  Patient states unknown foreign body is inserted into left heel when walking 1 year ago, cause no symptoms until 1 month ago with persistent left heel pain progressing to constant left heel pain causing difficulty with ambulation.  Went to podiatrist 1 week ago, attempted to extract however secondary to pain was unable to extract, was sent here for further evaluation.  Denies fevers chills chest pain shortness of breath nausea vomiting diarrhea dysuria hematuria abdominal pain.    VSS. Clinically stable. EKG wnl w no ST elevations or T wave inversions. PE, well appearing, no acute distress, AAOx3. NCAT, EOMI, normal conjunctiva, mucous membranes moist, LCTAB no w/r/c, no MRG, RRR, abd NDNT, no rebound tenderness or guarding, no CVA ttp, no focal neuro deficits, neurovascularly intact, dp 2+, full ROM of b/l ankles, small 1cm circumferential lesion with abrasion and skin tear with mild erythema, w no surrounding erythema or purulent discharge. Suspicion for foreign body vs low suspiucoin for osteo vs nec fasc. will assess w labs, XR, c/s pods 69-year-old male past medical history of CAD status post 1 stent, hypertension, hyperlipidemia, diabetes presents to the ED complaining of left heel pain for 1 month status post foreign body 1 year ago.  Patient states unknown foreign body is inserted into left heel when walking 1 year ago, cause no symptoms until 1 month ago with persistent left heel pain progressing to constant left heel pain causing difficulty with ambulation.  Went to podiatrist 1 week ago, attempted to extract however secondary to pain was unable to extract, was sent here for further evaluation.  Denies fevers chills chest pain shortness of breath nausea vomiting diarrhea dysuria hematuria abdominal pain.    VSS. Clinically stable. EKG wnl w no ST elevations or T wave inversions. PE, well appearing, no acute distress, AAOx3. NCAT, EOMI, normal conjunctiva, mucous membranes moist, LCTAB no w/r/c, no MRG, RRR, abd NDNT, no rebound tenderness or guarding, no CVA ttp, no focal neuro deficits, neurovascularly intact, dp 2+, full ROM of b/l ankles, small 1cm circumferential lesion with abrasion and skin tear with mild erythema, w no surrounding erythema or purulent discharge. Suspicion for foreign body vs low suspicion for osteo vs nec fasc. will assess w labs, XR, c/s pods    Attending MD Aranda.  Agree with above.  Pt is a 68 yo male with pmhx c/w above who presents with subacute L heel pain with known FB.  Pt seen by outpt podiatry and sent to ED for removal/further care 2/2 inability to extract as outpt.

## 2024-05-09 NOTE — ED PROVIDER NOTE - OBJECTIVE STATEMENT
69-year-old male past medical history of CAD status post 1 stent, hypertension, hyperlipidemia, diabetes presents to the ED complaining of left heel pain for 1 month status post foreign body 1 year ago.  Patient states unknown foreign body is inserted into left heel when walking 1 year ago, cause no symptoms until 1 month ago with persistent left heel pain progressing to constant left heel pain causing difficulty with ambulation.  Went to podiatrist 1 week ago, attempted to extract however secondary to pain was unable to extract, was sent here for further evaluation.  Denies fevers chills chest pain shortness of breath nausea vomiting diarrhea dysuria hematuria abdominal pain.

## 2024-05-09 NOTE — ED PROVIDER NOTE - ED STEMI HIDDEN
Due to a change in the Pt's insurance [employer] is requesting that the Novolog be switched to Humalog in order to avoid going through a PA. hide

## 2024-05-09 NOTE — H&P ADULT - NSHPSOCIALHISTORY_GEN_ALL_CORE
Former smoker, smoked 4-6 cigarettes daily x50 years, quit ~1 year ago.  No alcohol or illicit drug use.  Lives with wife, son, and daughter-in-law.  Occasionally ambulates with a cane, usually self ambulatory.

## 2024-05-09 NOTE — H&P ADULT - ASSESSMENT
68 yo man, Indonesian speaking, with history of afib s/p ablation (1/2/24), on Eliquis, CAD s/p stent (2014), COPD/asthma, HTN, type 2 DM (not on insulin) c/b gastroparesis, CKD, and nephrolithiasis presents with 1 month of worsening left heel pain, found to have CHRISTA on CKD, admitted for further workup.   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

## 2024-05-09 NOTE — H&P ADULT - PROBLEM SELECTOR PLAN 5
Pt with history of HFrecEF, with EF previously 40-45% in 11/2023, now 57% on 5/6/24. Both pt and pt's son do not know all of pt's home medications. Pt's son initially reported that there have been no changes to pt's home medications since Med Rec was performed on 1/2/24. However, pt's son later remarks pt is no longer on any diuretics, which were listed as home medications on Med Rec from 1/2/24. Also unsure if pt still on Entresto given recovery of EF.   - Hold Entresto, PO Lasix, and spironolactone for now until pt's home medications confirmed in AM  - C/w Coreg 6.25 mg BID  - Monitor with I&Os and daily weights  - Salt restriction

## 2024-05-09 NOTE — H&P ADULT - PROBLEM SELECTOR PLAN 7
Pt with history of COPD/asthma overlap. No S/S of exacerbation at this time.  - C/w Xopenex q6hrs PRN for SOB and/or wheezing  - C/w Symbicort 80 mcg BID (therapeutic interchange for home Dulera)  - C/w Singulair 10 mg daily

## 2024-05-09 NOTE — CONSULT NOTE ADULT - SUBJECTIVE AND OBJECTIVE BOX
Patient is a 69y old  Male who presents with a chief complaint of     HPI:      PAST MEDICAL & SURGICAL HISTORY:  Type 2 diabetes mellitus      CAD (coronary artery disease)      Asthma      Essential hypertension      Afib      CHF (congestive heart failure)      Kidney stone      Aortic valve regurgitation      No significant past surgical history          MEDICATIONS  (STANDING):  diphtheria/tetanus/pertussis (acellular) Vaccine (Adacel) 0.5 milliLiter(s) IntraMuscular once  sodium chloride 0.9% Bolus 1000 milliLiter(s) IV Bolus once    MEDICATIONS  (PRN):      Allergies    No Known Allergies    Intolerances        VITALS:    Vital Signs Last 24 Hrs  T(C): 36.7 (09 May 2024 14:50), Max: 36.7 (09 May 2024 14:50)  T(F): 98 (09 May 2024 14:50), Max: 98 (09 May 2024 14:50)  HR: 75 (09 May 2024 14:50) (75 - 79)  BP: 141/62 (09 May 2024 14:50) (141/62 - 149/73)  BP(mean): 86 (09 May 2024 14:50) (86 - 86)  RR: 16 (09 May 2024 14:50) (16 - 20)  SpO2: 100% (09 May 2024 14:50) (99% - 100%)    Parameters below as of 09 May 2024 14:50  Patient On (Oxygen Delivery Method): room air        LABS:                          12.3   12.43 )-----------( 260      ( 09 May 2024 14:33 )             36.0       05-09    138  |  105  |  24<H>  ----------------------------<  157<H>  4.6   |  21<L>  |  1.75<H>    Ca    9.0      09 May 2024 14:33    TPro  7.7  /  Alb  4.3  /  TBili  0.3  /  DBili  x   /  AST  14  /  ALT  12  /  AlkPhos  109  05-09      CAPILLARY BLOOD GLUCOSE      POCT Blood Glucose.: 388 mg/dL (09 May 2024 11:31)      PT/INR - ( 09 May 2024 14:33 )   PT: 13.3 sec;   INR: 1.19 ratio         PTT - ( 09 May 2024 14:33 )  PTT:45.5 sec    LOWER EXTREMITY PHYSICAL EXAM:    Vascular: DP/PT 1/4, B/L, CFT <3 seconds B/L, Temperature gradient warm to warm, B/L.   Neuro: Epicritic sensation intact to the level of digits, B/L.  Musculoskeletal/Ortho: unremarkable   Skin: L foot posterior plantar heel wound to dermis with overlying eschar, no erythema, no drainage, no purulence, no flucutance, no palpable retained foreign body. R foot no open wounds or signs of infection.       RADIOLOGY & ADDITIONAL STUDIES:    < from: Xray Foot AP + Lateral + Oblique, Left (05.09.24 @ 14:00) >    ACC: [id number] EXAM:  XR FOOT COMP MIN 3 VIEWS LT   ORDERED BY: CHARLY ZUNIGA     PROCEDURE DATE:  05/09/2024          INTERPRETATION:  XR FOOT COMPLETE 3 VIEWS LEFT    HISTORY:  Concern for osteomyelitis.    VIEWS: 3  IMAGES: 3    COMPARISON: None.    FINDINGS:    OSSEOUS STRUCTURES    Fractures:  None.    JOINTS    Joint Space(s):  Maintained. Pes planus is suggested.    OTHER FINDINGS:  Alexander's deformity is noted with prominence of the   superior calcaneal tuberosity. No soft tissue gas is seen.    IMPRESSION:  1.  No osseous destruction is appreciated.    --- End of Report ---            KING ORTIZ MD; Attending Radiologist  This document has been electronically signed. May  9 2024  2:31PM    < end of copied text >   Patient is a 69y old  Male who presents with a chief complaint of     HPI: 69-year-old male past medical history of CAD status post 1 stent, hypertension, hyperlipidemia, diabetes presents to the ED complaining of left heel pain for 1 month status post foreign body 1 year ago.  Patient states unknown foreign body is inserted into left heel when walking 1 year ago, cause no symptoms until 1 month ago with persistent left heel pain progressing to constant left heel pain causing difficulty with ambulation.  Went to podiatrist 1 week ago, attempted to extract however secondary to pain was unable to extract, was sent here for further evaluation.  Denies fevers chills chest pain shortness of breath nausea vomiting diarrhea dysuria hematuria abdominal pain.      PAST MEDICAL & SURGICAL HISTORY:  Type 2 diabetes mellitus      CAD (coronary artery disease)      Asthma      Essential hypertension      Afib      CHF (congestive heart failure)      Kidney stone      Aortic valve regurgitation      No significant past surgical history          MEDICATIONS  (STANDING):  diphtheria/tetanus/pertussis (acellular) Vaccine (Adacel) 0.5 milliLiter(s) IntraMuscular once  sodium chloride 0.9% Bolus 1000 milliLiter(s) IV Bolus once    MEDICATIONS  (PRN):      Allergies    No Known Allergies    Intolerances        VITALS:    Vital Signs Last 24 Hrs  T(C): 36.7 (09 May 2024 14:50), Max: 36.7 (09 May 2024 14:50)  T(F): 98 (09 May 2024 14:50), Max: 98 (09 May 2024 14:50)  HR: 75 (09 May 2024 14:50) (75 - 79)  BP: 141/62 (09 May 2024 14:50) (141/62 - 149/73)  BP(mean): 86 (09 May 2024 14:50) (86 - 86)  RR: 16 (09 May 2024 14:50) (16 - 20)  SpO2: 100% (09 May 2024 14:50) (99% - 100%)    Parameters below as of 09 May 2024 14:50  Patient On (Oxygen Delivery Method): room air        LABS:                          12.3   12.43 )-----------( 260      ( 09 May 2024 14:33 )             36.0       05-09    138  |  105  |  24<H>  ----------------------------<  157<H>  4.6   |  21<L>  |  1.75<H>    Ca    9.0      09 May 2024 14:33    TPro  7.7  /  Alb  4.3  /  TBili  0.3  /  DBili  x   /  AST  14  /  ALT  12  /  AlkPhos  109  05-09      CAPILLARY BLOOD GLUCOSE      POCT Blood Glucose.: 388 mg/dL (09 May 2024 11:31)      PT/INR - ( 09 May 2024 14:33 )   PT: 13.3 sec;   INR: 1.19 ratio         PTT - ( 09 May 2024 14:33 )  PTT:45.5 sec    LOWER EXTREMITY PHYSICAL EXAM:    Vascular: DP/PT 1/4, B/L, CFT <3 seconds B/L, Temperature gradient warm to warm, B/L.   Neuro: Epicritic sensation intact to the level of digits, B/L.  Musculoskeletal/Ortho: unremarkable   Skin: L foot posterior plantar heel wound to dermis with overlying eschar, no erythema, no drainage, no purulence, no flucutance, no palpable retained foreign body. R foot no open wounds or signs of infection.       RADIOLOGY & ADDITIONAL STUDIES:    < from: Xray Foot AP + Lateral + Oblique, Left (05.09.24 @ 14:00) >    ACC: [id number] EXAM:  XR FOOT COMP MIN 3 VIEWS LT   ORDERED BY: CHARLY ZUNIGA     PROCEDURE DATE:  05/09/2024          INTERPRETATION:  XR FOOT COMPLETE 3 VIEWS LEFT    HISTORY:  Concern for osteomyelitis.    VIEWS: 3  IMAGES: 3    COMPARISON: None.    FINDINGS:    OSSEOUS STRUCTURES    Fractures:  None.    JOINTS    Joint Space(s):  Maintained. Pes planus is suggested.    OTHER FINDINGS:  Alexander's deformity is noted with prominence of the   superior calcaneal tuberosity. No soft tissue gas is seen.    IMPRESSION:  1.  No osseous destruction is appreciated.    --- End of Report ---            KING ORTIZ MD; Attending Radiologist  This document has been electronically signed. May  9 2024  2:31PM    < end of copied text >

## 2024-05-09 NOTE — H&P ADULT - PROBLEM SELECTOR PLAN 2
SCr 1.75 on admission, improved to 1.58 after 2L bolus of NS in ED. Unclear recent baseline SCr, was 1.15-1.32 in Nov-Dec 2023. Possibly pre-renal CHRISTA given improvement in SCr with IVF. Can also be from AIN 2/2 recent amoxicillin use, as pt with eosinophilia on admission.   - Check urine lytes and US kidney/bladder  - Hold off on additional IVF for now, as pt with history of CHF. Encourage PO hydration. Hold any diuretics as below.   - Hold amoxicillin / Augmentin given possibility of AIN   - Monitor SCr and urine output  - Avoid NSAIDs and nephrotoxic agents  - Renally dose meds

## 2024-05-09 NOTE — H&P ADULT - NSHPLABSRESULTS_GEN_ALL_CORE
EKG personally reviewed.    Labs personally reviewed.                        12.3   12.43 )-----------( 260      ( 09 May 2024 14:33 )             36.0     eosinophils 2.49  eosinophil % 20        138  |  107  |  23  ----------------------------<  223<H>  4.1   |  18<L>  |  1.58<H>    Ca    8.1<L>      09 May 2024 18:52  Phos  3.5       Mg     2.00         TPro  7.7  /  Alb  4.3  /  TBili  0.3  /  DBili  x   /  AST  14  /  ALT  12  /  AlkPhos  109      ESR 58, CRP 8.5    BHB 0    VBG pH 7.25 -> 7.17, lactate 1.6 -> 2.6 -> 2.1, pCO2 54 -> 51 -> 56    Urinalysis Basic - ( 09 May 2024 18:52 )  Color: Yellow / Appearance: Clear / S.016 / pH: x  Gluc: 223 mg/dL / Ketone: Negative mg/dL  / Bili: Negative / Urobili: 0.2 mg/dL   Blood: x / Protein: Trace mg/dL / Nitrite: Negative   Leuk Esterase: Negative / RBC: 0 /HPF / WBC 0 /HPF   Sq Epi: x / Non Sq Epi: 0 /HPF / Bacteria: Negative /HPF    Imaging personally reviewed.  ACC: [id number] EXAM:  XR FOOT COMP MIN 3 VIEWS LT   ORDERED BY: [employer]   PROCEDURE DATE:  2024    FINDINGS:  OSSEOUS STRUCTURES    Fractures:  None.    JOINTS    Joint Space(s):  Maintained. Pes planus is suggested.    OTHER FINDINGS:  Alexander's deformity is noted with prominence of the   superior calcaneal tuberosity. No soft tissue gas is seen.    IMPRESSION:  1.  No osseous destruction is appreciated.    ACC: [id number] EXAM:  XR CHEST PA LAT 2V   ORDERED BY: [employer]   PROCEDURE DATE:  2024   FINDINGS:  Support devices: None.  Lungs/Pleura: No focal parenchymal opacities.  No pneumothorax. No   pleural effusions.  No pulmonary vascular congestion.  Heart/Mediastinum: The cardiomediastinal silhouette is within normal   limits.  Skeletal/soft tissues: No acute pathology.    IMPRESSION:  No evidence of acute cardiopulmonary disease.

## 2024-05-09 NOTE — H&P ADULT - NSHPLANGTRANSLATORFT_GEN_A_CORE
Pt preferred that his son at bedside, Cindy, serve as  rather than use free translation services offered

## 2024-05-10 ENCOUNTER — TRANSCRIPTION ENCOUNTER (OUTPATIENT)
Age: 69
End: 2024-05-10

## 2024-05-10 DIAGNOSIS — E11.9 TYPE 2 DIABETES MELLITUS WITHOUT COMPLICATIONS: ICD-10-CM

## 2024-05-10 DIAGNOSIS — I50.32 CHRONIC DIASTOLIC (CONGESTIVE) HEART FAILURE: ICD-10-CM

## 2024-05-10 DIAGNOSIS — I25.10 ATHEROSCLEROTIC HEART DISEASE OF NATIVE CORONARY ARTERY WITHOUT ANGINA PECTORIS: ICD-10-CM

## 2024-05-10 DIAGNOSIS — D72.829 ELEVATED WHITE BLOOD CELL COUNT, UNSPECIFIED: ICD-10-CM

## 2024-05-10 DIAGNOSIS — Z98.890 OTHER SPECIFIED POSTPROCEDURAL STATES: Chronic | ICD-10-CM

## 2024-05-10 DIAGNOSIS — N17.9 ACUTE KIDNEY FAILURE, UNSPECIFIED: ICD-10-CM

## 2024-05-10 DIAGNOSIS — I10 ESSENTIAL (PRIMARY) HYPERTENSION: ICD-10-CM

## 2024-05-10 DIAGNOSIS — Z29.9 ENCOUNTER FOR PROPHYLACTIC MEASURES, UNSPECIFIED: ICD-10-CM

## 2024-05-10 DIAGNOSIS — M79.672 PAIN IN LEFT FOOT: ICD-10-CM

## 2024-05-10 DIAGNOSIS — I48.20 CHRONIC ATRIAL FIBRILLATION, UNSPECIFIED: ICD-10-CM

## 2024-05-10 DIAGNOSIS — Z79.899 OTHER LONG TERM (CURRENT) DRUG THERAPY: ICD-10-CM

## 2024-05-10 DIAGNOSIS — J44.89 OTHER SPECIFIED CHRONIC OBSTRUCTIVE PULMONARY DISEASE: ICD-10-CM

## 2024-05-10 LAB
CREAT ?TM UR-MCNC: 46 MG/DL — SIGNIFICANT CHANGE UP
GLUCOSE BLDC GLUCOMTR-MCNC: 136 MG/DL — HIGH (ref 70–99)
GLUCOSE BLDC GLUCOMTR-MCNC: 230 MG/DL — HIGH (ref 70–99)
GLUCOSE BLDC GLUCOMTR-MCNC: 249 MG/DL — HIGH (ref 70–99)
GLUCOSE BLDC GLUCOMTR-MCNC: 275 MG/DL — HIGH (ref 70–99)
UUN UR-MCNC: 327 MG/DL — SIGNIFICANT CHANGE UP

## 2024-05-10 PROCEDURE — 99233 SBSQ HOSP IP/OBS HIGH 50: CPT

## 2024-05-10 PROCEDURE — 76770 US EXAM ABDO BACK WALL COMP: CPT | Mod: 26

## 2024-05-10 RX ORDER — EMPAGLIFLOZIN 10 MG/1
1 TABLET, FILM COATED ORAL
Refills: 0 | DISCHARGE

## 2024-05-10 RX ORDER — CARVEDILOL PHOSPHATE 80 MG/1
6.25 CAPSULE, EXTENDED RELEASE ORAL ONCE
Refills: 0 | Status: COMPLETED | OUTPATIENT
Start: 2024-05-10 | End: 2024-05-10

## 2024-05-10 RX ORDER — APIXABAN 2.5 MG/1
1 TABLET, FILM COATED ORAL
Refills: 0 | DISCHARGE

## 2024-05-10 RX ORDER — ASPIRIN/CALCIUM CARB/MAGNESIUM 324 MG
1 TABLET ORAL
Refills: 0 | DISCHARGE

## 2024-05-10 RX ORDER — APIXABAN 2.5 MG/1
5 TABLET, FILM COATED ORAL EVERY 12 HOURS
Refills: 0 | Status: DISCONTINUED | OUTPATIENT
Start: 2024-05-10 | End: 2024-05-11

## 2024-05-10 RX ORDER — METFORMIN HYDROCHLORIDE 850 MG/1
1 TABLET ORAL
Refills: 0 | DISCHARGE

## 2024-05-10 RX ORDER — BUDESONIDE AND FORMOTEROL FUMARATE DIHYDRATE 160; 4.5 UG/1; UG/1
2 AEROSOL RESPIRATORY (INHALATION)
Refills: 0 | Status: DISCONTINUED | OUTPATIENT
Start: 2024-05-10 | End: 2024-05-11

## 2024-05-10 RX ORDER — CARVEDILOL PHOSPHATE 80 MG/1
1 CAPSULE, EXTENDED RELEASE ORAL
Refills: 0 | DISCHARGE

## 2024-05-10 RX ORDER — ASPIRIN/CALCIUM CARB/MAGNESIUM 324 MG
81 TABLET ORAL DAILY
Refills: 0 | Status: DISCONTINUED | OUTPATIENT
Start: 2024-05-10 | End: 2024-05-11

## 2024-05-10 RX ORDER — LEVALBUTEROL 1.25 MG/.5ML
0.63 SOLUTION, CONCENTRATE RESPIRATORY (INHALATION) EVERY 6 HOURS
Refills: 0 | Status: DISCONTINUED | OUTPATIENT
Start: 2024-05-10 | End: 2024-05-11

## 2024-05-10 RX ORDER — CARVEDILOL PHOSPHATE 80 MG/1
6.25 CAPSULE, EXTENDED RELEASE ORAL EVERY 12 HOURS
Refills: 0 | Status: DISCONTINUED | OUTPATIENT
Start: 2024-05-10 | End: 2024-05-11

## 2024-05-10 RX ORDER — PANTOPRAZOLE SODIUM 20 MG/1
40 TABLET, DELAYED RELEASE ORAL
Refills: 0 | Status: DISCONTINUED | OUTPATIENT
Start: 2024-05-10 | End: 2024-05-11

## 2024-05-10 RX ORDER — APIXABAN 2.5 MG/1
5 TABLET, FILM COATED ORAL ONCE
Refills: 0 | Status: COMPLETED | OUTPATIENT
Start: 2024-05-10 | End: 2024-05-10

## 2024-05-10 RX ORDER — ATORVASTATIN CALCIUM 80 MG/1
80 TABLET, FILM COATED ORAL AT BEDTIME
Refills: 0 | Status: DISCONTINUED | OUTPATIENT
Start: 2024-05-10 | End: 2024-05-11

## 2024-05-10 RX ORDER — MONTELUKAST 4 MG/1
1 TABLET, CHEWABLE ORAL
Refills: 0 | DISCHARGE

## 2024-05-10 RX ORDER — MONTELUKAST 4 MG/1
10 TABLET, CHEWABLE ORAL DAILY
Refills: 0 | Status: DISCONTINUED | OUTPATIENT
Start: 2024-05-10 | End: 2024-05-11

## 2024-05-10 RX ORDER — ATORVASTATIN CALCIUM 80 MG/1
1 TABLET, FILM COATED ORAL
Refills: 0 | DISCHARGE

## 2024-05-10 RX ADMIN — OXYCODONE HYDROCHLORIDE 5 MILLIGRAM(S): 5 TABLET ORAL at 01:09

## 2024-05-10 RX ADMIN — CARVEDILOL PHOSPHATE 6.25 MILLIGRAM(S): 80 CAPSULE, EXTENDED RELEASE ORAL at 01:09

## 2024-05-10 RX ADMIN — ATORVASTATIN CALCIUM 80 MILLIGRAM(S): 80 TABLET, FILM COATED ORAL at 21:12

## 2024-05-10 RX ADMIN — CARVEDILOL PHOSPHATE 6.25 MILLIGRAM(S): 80 CAPSULE, EXTENDED RELEASE ORAL at 21:12

## 2024-05-10 RX ADMIN — BUDESONIDE AND FORMOTEROL FUMARATE DIHYDRATE 2 PUFF(S): 160; 4.5 AEROSOL RESPIRATORY (INHALATION) at 21:13

## 2024-05-10 RX ADMIN — APIXABAN 5 MILLIGRAM(S): 2.5 TABLET, FILM COATED ORAL at 09:27

## 2024-05-10 RX ADMIN — CARVEDILOL PHOSPHATE 6.25 MILLIGRAM(S): 80 CAPSULE, EXTENDED RELEASE ORAL at 09:27

## 2024-05-10 RX ADMIN — Medication 100 MILLIGRAM(S): at 18:27

## 2024-05-10 RX ADMIN — Medication 81 MILLIGRAM(S): at 09:23

## 2024-05-10 RX ADMIN — PANTOPRAZOLE SODIUM 40 MILLIGRAM(S): 20 TABLET, DELAYED RELEASE ORAL at 09:28

## 2024-05-10 RX ADMIN — Medication 100 MILLIGRAM(S): at 00:09

## 2024-05-10 RX ADMIN — Medication 6: at 18:24

## 2024-05-10 RX ADMIN — Medication 650 MILLIGRAM(S): at 00:07

## 2024-05-10 RX ADMIN — Medication 4: at 12:42

## 2024-05-10 RX ADMIN — MONTELUKAST 10 MILLIGRAM(S): 4 TABLET, CHEWABLE ORAL at 09:28

## 2024-05-10 RX ADMIN — OXYCODONE HYDROCHLORIDE 5 MILLIGRAM(S): 5 TABLET ORAL at 01:39

## 2024-05-10 RX ADMIN — APIXABAN 5 MILLIGRAM(S): 2.5 TABLET, FILM COATED ORAL at 21:13

## 2024-05-10 RX ADMIN — Medication 650 MILLIGRAM(S): at 00:44

## 2024-05-10 RX ADMIN — BUDESONIDE AND FORMOTEROL FUMARATE DIHYDRATE 2 PUFF(S): 160; 4.5 AEROSOL RESPIRATORY (INHALATION) at 09:29

## 2024-05-10 RX ADMIN — Medication 100 MILLIGRAM(S): at 09:28

## 2024-05-10 RX ADMIN — Medication 2: at 00:15

## 2024-05-10 RX ADMIN — APIXABAN 5 MILLIGRAM(S): 2.5 TABLET, FILM COATED ORAL at 01:10

## 2024-05-10 NOTE — PHYSICAL THERAPY INITIAL EVALUATION ADULT - NSPTDISCHREC_GEN_A_CORE
pt appears to be at his baseline level; will keep pt on PT program while at [facility] to prevent weakness/deconditioning/No skilled PT needs

## 2024-05-10 NOTE — PHYSICAL THERAPY INITIAL EVALUATION ADULT - PATIENT PROFILE REVIEW, REHAB EVAL
ACTIVITY ORDER: OOB with Assistance; spoke with RN Raheem Patrick prior to PT evaluation-->Pt OK for PT consult/OOB activity./yes

## 2024-05-10 NOTE — DISCHARGE NOTE PROVIDER - NSDCCPTREATMENT_GEN_ALL_CORE_FT
PRINCIPAL PROCEDURE  Procedure: Ultrasound, kidney and bladder  Findings and Treatment: FINDINGS:  Right kidney: 10.8 cm. No hydronephrosis or renal mass seen.   Nonobstructing calculus measuring 1.5 cm.  Left kidney: 11.2 cm. No renal mass, hydronephrosis or calculi visualized.  Urinary bladder: Withinnormal limits.  IMPRESSION:  No hydronephrosis. Nonobstructing right renal calculus.  < from: US Kidney and Bladder (05.10.24 @ 11:16) >        SECONDARY PROCEDURE  Procedure: XR foot left, 3 views  Findings and Treatment: FINDINGS:  OSSEOUS STRUCTURES  Fractures:  None.  JOINTS  Joint Space(s):  Maintained. Pes planus is suggested.  OTHER FINDINGS:  Alexander's deformity is noted with prominence of the superior calcaneal tuberosity. No soft tissue gas is seen.  IMPRESSION:  1.  No osseous destruction is appreciated.  < from: Xray Foot AP + Lateral + Oblique, Left (05.09.24 @ 14:00) >

## 2024-05-10 NOTE — PHARMACOTHERAPY INTERVENTION NOTE - COMMENTS
Medication history is saved as incomplete.   Medication list in Outpatient Medication Review (OMR) verified with outpatient pharmacy however further clarification needed for use of Lasix, Spironolactone & Entresto.   Of Note:   - Family unsure of diuretics and Entresto - may have been told to stop taking at home ?  - Spoke with PCP (Dr. Oconnor - [phone]) - noted patient was last seen in office on 4/16/24 and Creatinine was noted to be 1.8.   - Patient may have been instructed to stop taking diuretics and Entresto by PCP after that office visit (needs further clarification for continuation).

## 2024-05-10 NOTE — CONSULT NOTE ADULT - SUBJECTIVE AND OBJECTIVE BOX
[facility] [place] [place] [facility]   MD FE COUGHLIN MD ANGELA WONG, PA QIAN CHEN, NP      TEL:  OFFICE: [phone]  From 5pm-7am answering service [phone]    --- INITIAL RENAL CONSULT NOTE ---date of service 05-10-24 @ 14:56    HPI:  68 yo man, Welsh speaking, with history of afib s/p ablation (24), on Eliquis, HFrecEF (EF previously 40-45% in 2023, now 57% on 24), CAD s/p stent (), COPD/asthma, HTN, type 2 DM (not on insulin) c/b gastroparesis, CKD, and nephrolithiasis presents with 1 month of worsening left heel pain. Pt recalls that about a year ago, he had been walking and stepped on an object that might have penetrated and embedded into his left heel. Pt didn't feel any ongoing pain after the episode and disregarded the injury, but starting 1 month ago, pt began having intermittent left heel pain that would worsen with weight bearing on the left foot. Pt saw his podiatrist last week, and during the visit, an attempt was made by the podiatrist to extract the possible foreign object from pt's left heel. However, the attempt was aborted, with pt being unable to withstand the pain from the procedure. Pt was prescribed a course of amoxicillin, which pt started this past Tuesday. Despite taking the amoxicillin, pt's left heel pain intensified Wednesday night, becoming 10/10 in severity and prompting pt's visit to the ED today. Pt had also been taking PO Tylenol as needed for the pain, no recent NSAID use. Pt denies any associated fever or chills. No chest pain, shortness of breath, palpitations, cough, abdominal pain, nausea, vomiting, diarrhea, constipation, melena, BRBPR, or urinary symptoms.       Allergies:  No Known Allergies      PAST MEDICAL & SURGICAL HISTORY:  Type 2 diabetes mellitus    CAD (coronary artery disease)    Asthma    Essential hypertension    Afib    CHF (congestive heart failure)    Kidney stone    Aortic valve regurgitation    S/P ablation of atrial fibrillation          Home Medications Reviewed    Hospital Medications:   MEDICATIONS  (STANDING):  apixaban 5 milliGRAM(s) Oral every 12 hours  aspirin enteric coated 81 milliGRAM(s) Oral daily  atorvastatin 80 milliGRAM(s) Oral at bedtime  budesonide  80 MICROgram(s)/formoterol 4.5 MICROgram(s) Inhaler 2 Puff(s) Inhalation two times a day  carvedilol 6.25 milliGRAM(s) Oral every 12 hours  dextrose 10% Bolus 125 milliLiter(s) IV Bolus once  dextrose 5%. 1000 milliLiter(s) (100 mL/Hr) IV Continuous <Continuous>  dextrose 5%. 1000 milliLiter(s) (50 mL/Hr) IV Continuous <Continuous>  dextrose 50% Injectable 25 Gram(s) IV Push once  dextrose 50% Injectable 12.5 Gram(s) IV Push once  doxycycline monohydrate Capsule 100 milliGRAM(s) Oral every 12 hours  glucagon  Injectable 1 milliGRAM(s) IntraMuscular once  insulin lispro (ADMELOG) corrective regimen sliding scale   SubCutaneous three times a day before meals  insulin lispro (ADMELOG) corrective regimen sliding scale   SubCutaneous at bedtime  montelukast 10 milliGRAM(s) Oral daily  pantoprazole    Tablet 40 milliGRAM(s) Oral before breakfast      SOCIAL HISTORY:  Denies ETOh, Smoking,     FAMILY HISTORY:  Family history of asthma in mother (Mother)    FHx: diabetes mellitus (Sibling)    FH: HTN (hypertension) (Sibling)    FHx: diabetes mellitus (Child)    FH: HTN (hypertension) (Child)        REVIEW OF SYSTEMS:  CONSTITUTIONAL: No weakness, fevers or chills  EYES/ENT: No visual changes;  No vertigo or throat pain   NECK: No pain or stiffness  RESPIRATORY: No cough, wheezing, hemoptysis; No shortness of breath  CARDIOVASCULAR: No chest pain or palpitations.  GASTROINTESTINAL: No abdominal or epigastric pain. No nausea, vomiting, or hematemesis; No diarrhea or constipation. No melena or hematochezia.  GENITOURINARY: No dysuria, frequency, foamy urine, urinary urgency, incontinence or hematuria  NEUROLOGICAL: No numbness or weakness  SKIN: No itching, burning, rashes, or lesions   VASCULAR: No bilateral lower extremity edema.   All other review of systems is negative unless indicated above.    VITALS:  T(F): 97.5 (05-10-24 @ 09:06), Max: 98.1 (24 @ 22:14)  HR: 78 (05-10-24 @ 09:06)  BP: 119/63 (05-10-24 @ 09:06)  RR: 16 (05-10-24 @ 09:06)  SpO2: 98% (05-10-24 @ 09:06)  Wt(kg): --      PHYSICAL EXAM:  General: NAD  HEENT: anicteric sclera, oropharynx clear, MMM  Neck: No JVD  Respiratory: CTAB, no wheezes, rales or rhonchi  Cardiovascular: S1, S2, RRR  Gastrointestinal: BS+, soft, NT/ND  Extremities: No cyanosis or clubbing. No peripheral edema  Neurological: A/O x 3, no focal deficits  Psychiatric: Normal mood, normal affect  : No CVA tenderness. No mena.   Skin: No rashes    LABS:      138  |  107  |  23  ----------------------------<  223<H>  4.1   |  18<L>  |  1.58<H>    Ca    8.1<L>      09 May 2024 18:52  Phos  3.5       Mg     2.00         TPro  7.7  /  Alb  4.3  /  TBili  0.3  /  DBili      /  AST  14  /  ALT  12  /  AlkPhos  109      Creatinine Trend: 1.58 <--, 1.75 <--                        12.3   12.43 )-----------( 260      ( 09 May 2024 14:33 )             36.0     Urine Studies:  Urinalysis Basic - ( 09 May 2024 18:52 )    Color: Yellow / Appearance: Clear / S.016 / pH:   Gluc: 223 mg/dL / Ketone: Negative mg/dL  / Bili: Negative / Urobili: 0.2 mg/dL   Blood:  / Protein: Trace mg/dL / Nitrite: Negative   Leuk Esterase: Negative / RBC: 0 /HPF / WBC 0 /HPF   Sq Epi:  / Non Sq Epi: 0 /HPF / Bacteria: Negative /HPF        RADIOLOGY & ADDITIONAL STUDIES:

## 2024-05-10 NOTE — DISCHARGE NOTE PROVIDER - NSDCFUADDAPPT_GEN_ALL_CORE_FT
You were evaluated by our podiatric surgeons (foot specialists) and they did not believe that you needed any surgical procedures for your heel wound. Please follow up with Dr. Joseph Waterhouse within 1 week of discharge from the hospital for further care. Please call [phone] to schedule an appointment.

## 2024-05-10 NOTE — PROGRESS NOTE ADULT - PROBLEM SELECTOR PLAN 4
[id number] 5. S/p ablation on 1/2/24. On Eliquis 5 mg BID at home.  - C/w Eliquis 5 mg BID  - C/w Coreg 6.25 mg BID

## 2024-05-10 NOTE — DISCHARGE NOTE PROVIDER - NSDCCPCAREPLAN_GEN_ALL_CORE_FT
PRINCIPAL DISCHARGE DIAGNOSIS  Diagnosis: Pain of left heel  Assessment and Plan of Treatment: You initially came to the hospital due to pain in your left heel after possible injury to the left foot. You underwent Xrays of the left foot which did not reveal any fractures or evidence of damage to the bone. You were also seen by our Podiatry team and they evaluated your heel wound. They recommended that you wear a surgical shoe to help offload the weight from your left heel and also recommended that you take antibiotics to help your left heel wound recover. You were started on an antibiotic called Doxycycline which you should continue to take for a total of 10 days. Be sure to complete the full course of antibiotics. Please follow up with Dr. Joseph Waterhouse (Podiatry) within 1 week of discharge from the hospital for follow up care.      SECONDARY DISCHARGE DIAGNOSES  Diagnosis: Acute kidney injury superimposed on CKD  Assessment and Plan of Treatment: In the hospital you were noted to have an elevated creatinine, which is a marker of kidney injury. We performed an ultrasound of your kidneys which did not reveal any blockages or swelling. You were evaluated by our kidney specialists and your kidney function was closely monitored. Over time, we noted that your kidney function was improving on its own. Do not take NSAID medications such as Ibuprofen, Advil, Motrin or Meloxicam as these can damage your kidneys. Continue to avoid taking medications such as Lasix, Entresto or Spironolactone as these medications can also worsen your kidney function. Please be sure to follow up with your primary care doctor or cardiologist to determine when it might be safe to resume taking these medications.     PRINCIPAL DISCHARGE DIAGNOSIS  Diagnosis: Pain of left heel  Assessment and Plan of Treatment: You initially came to the hospital due to pain in your left heel after possible injury to the left foot. You underwent Xrays of the left foot which did not reveal any fractures or evidence of damage to the bone. You were also seen by our Podiatry team and they evaluated your heel wound. They recommended that you wear a surgical shoe to help offload the weight from your left heel and also recommended that you take antibiotics to help your left heel wound recover. You were started on an antibiotic called Doxycycline which you should continue to take for a total of 10 days. Be sure to complete the full course of antibiotics. Please follow up with Dr. Joseph Waterhouse (Podiatry) within 1 week of discharge from the hospital for follow up care.      SECONDARY DISCHARGE DIAGNOSES  Diagnosis: Acute kidney injury superimposed on CKD  Assessment and Plan of Treatment: In the hospital you were noted to have an elevated creatinine, which is a marker of kidney injury. We performed an ultrasound of your kidneys which did not reveal any blockages or swelling. You were evaluated by our kidney specialists and your kidney function was closely monitored. Over time, we noted that your kidney function was improving on its own. Do not take NSAID medications such as Ibuprofen, Advil, Motrin or Meloxicam as these can damage your kidneys. Continue to avoid taking medications such as Metformin, Lasix, Entresto or Spironolactone as these medications can also worsen your kidney function. Please be sure to follow up with your primary care doctor or cardiologist to determine when it might be safe to resume taking these medications.

## 2024-05-10 NOTE — DISCHARGE NOTE PROVIDER - ATTENDING DISCHARGE PHYSICAL EXAMINATION:
Patient seen and examined at bedside on day of discharge (5/11/24). Patient pleasant and conversant. Reports that he overall feels well. Endorses mild heel pain which has overall improved. Denies any fevers, chills, nausea or vomiting. Denies any CP or SOB. VSS, afebrile. Exam notable for left heel eschar without any drainage or surrounding erythema. Leukocytosis stable and Cr consistently downtrending on labs. Patient otherwise medically optimized for discharge home today with close cardiology and nephrology follow up. Nephrotoxic agents held on d/c.

## 2024-05-10 NOTE — PHYSICAL THERAPY INITIAL EVALUATION ADULT - PERTINENT HX OF CURRENT PROBLEM, REHAB EVAL
68 yo man, Occitan speaking, with history of afib s/p ablation (1/2/24), on Eliquis, HFrecEF (EF previously 40-45% in 11/2023, now 57% on 5/6/24), CAD s/p stent (2014), COPD/asthma, HTN, type 2 DM (not on insulin) c/b gastroparesis, CKD, and nephrolithiasis presents with 1 month of worsening left heel pain, found to have CHRISTA on CKD, admitted for further workup. X-ray of Left foot:   Alexander's deformity is noted with prominence of the superior calcaneal tuberosity. No soft tissue gas is seen. No fractures.

## 2024-05-10 NOTE — PROGRESS NOTE ADULT - PROBLEM SELECTOR PLAN 10
- DVT ppx: On Eliquis for AC  - Diet: DASH/TLC/CC  - Dispo: d/c home on 5/11 if Cr stable/downtrending and cultures with NG

## 2024-05-10 NOTE — PHYSICAL THERAPY INITIAL EVALUATION ADULT - ADDITIONAL COMMENTS
Pt lives in a private house with his wife with 6 steps to enter. Prior to hospital admission, pt states that he was completely independent and used a single axis cane with ambulation. As per Care coordinator note, pt has CDPAP services via [facility].    Pt left comfortable in bed, NAD, all lines intact, all precautions maintained, with call bell in reach, and RN aware of PT evaluation.

## 2024-05-10 NOTE — PATIENT PROFILE ADULT - SAFE PLACE TO LIVE
Health Maintenance Due   Topic Date Due   • Shingles Vaccine (1 of 2) 07/30/2003   • Colorectal Cancer Screen-  08/15/2019   • Medicare Wellness Visit  05/31/2020   • Diabetes A1C  10/21/2020   • Diabetes Eye Exam  10/23/2020       Patient is due for topics listed above, she wishes to proceed with MWV (Medicare Wellness Visit), but is not proceeding with Immunization(s) Shingles at this time.  Patient will discuss screenings with doctor at today's visit.     Over the last 2 weeks, how often have you been bothered by the following problems?          PHQ2 Score: 6  PHQ2 Score Interpretation: Further screening needed  1. Little interest or pleasure in activity?: 3  2. Feeling down, depressed, or hopeless?: 3     PHQ9 Score: 19  PHQ9 Score Interpretation: Moderately Severe Depression  3. Trouble falling, staying asleep or sleeping all the time?: 3  4. Feeling tired or having little energy?: 3  5. Poor appetite or overeating?: 3  6. Feeling bad about yourself - or that you are a failure or that you have let yourself or your family down?: 2  7. Trouble concentrating on things such as reading a newspaper or watching television?: 1  8. Moving or speaking so slowly that other people could have noticed? Or the opposite - being so fidgety or restless that you were moving around a lot more than usual?: 0  9. Thoughts that you would be better off dead, or of hurting yourself in some way?: 1       3.) During the past 4 weeks, how would you rate your health?: Fair     5.) Do you do moderate to strenuous exercise (brisk walk) for about 20 minutes for 3 or more days per week?: No, I usually do not exercise this much     6 a.) How many servings of Fruits and Vegetables do you have each day ( 1 serving = 1 piece of fruit, 1/2 cup fruits or vegetables): None     6 b.) How many servings of High Fiber / Whole Grain Foods to you have each day ( 1 serving = 1 cup cold cereal, 1/2 cup cooked cereal, 1 slice bread): 1 per day     7.) Have  you had a fall two or more times in the past year?: Yes     8.) During the past 4 weeks, has your physical and emotional health limited your social activities with family, friends, neighbors, or other groups?: Quite a bit     10.) How often do you have trouble taking medicines the way you have been told to take them?: Sometimes I take my prescribed medications     11d.) Bodily pain: Always     11e.) Tiredness or Fatigue: Always     11f.) Feeling stressed or overwhelmed: Always     11g.) Anger or frustration: Often     11j.) Problems with your balance: Often     12.) Do you need help with any of the following activities?   (check all that apply): Getting in and out of bed or chairs     13.) Do you need help with any of the following activities?: Go shopping for groceries or clothes, Do your housework or laundry     14.) During the past 4 weeks, was someone available to help if you needed and wanted help?: Yes, a little     15.) How confident are you that you can control and manage most of your health problems?: Not very confident          no

## 2024-05-10 NOTE — DISCHARGE NOTE PROVIDER - PROVIDER TOKENS
PROVIDER:[TOKEN:[[id number]],FOLLOWUP:[2 weeks],ESTABLISHEDPATIENT:[T]],PROVIDER:[TOKEN:[[id number]],FOLLOWUP:[1 week]]

## 2024-05-10 NOTE — DISCHARGE NOTE PROVIDER - HOSPITAL COURSE
Patient is a 70 y/o man, Macedonian speaking, with history of afib s/p ablation (1/2/24), on Eliquis, HFrecEF (EF previously 40-45% in 11/2023, now 57% on 5/6/24), CAD s/p stent (2014), COPD/asthma, HTN, type 2 DM (not on insulin) c/b gastroparesis, CKD, and nephrolithiasis presents with 1 month of worsening left heel pain, found to have CHRISTA on CKD, admitted for further workup.    Patient was evaluated by podiatry and received local wound care for his left heel wound. He was recommended for a surgical shoe as well as PO antibiotics. No acute surgical intervention was warranted for his left heel wound. Patient was also noted with CHRISTA on CKD. He was seen by nephrology and underwent an renal US which revealed a nonobstructing renal stone. His creatinine was closely monitored and was downtrending throughout his admission

## 2024-05-10 NOTE — DISCHARGE NOTE PROVIDER - NSDCMRMEDTOKEN_GEN_ALL_CORE_FT
aspirin 81 mg oral tablet: 1 tab(s) orally once a day  atorvastatin 80 mg oral tablet: 1 tab(s) orally once a day  carvedilol 6.25 mg oral tablet: 1 tab(s) orally 2 times a day  Dulera 100 mcg-5 mcg/inh inhalation aerosol: 2 puff(s) inhaled 2 times a day  Eliquis 5 mg oral tablet: 1 tab(s) orally 2 times a day  empagliflozin 10 mg oral tablet: 1 tab(s) orally once a day  Entresto 24 mg-26 mg oral tablet: 1 tab(s) orally 2 times a day  furosemide 20 mg oral tablet: 1 tab(s) orally once a day  metFORMIN 1000 mg oral tablet: 1 tab(s) orally 2 times a day  Protonix 40 mg oral delayed release tablet: 1 tab(s) orally once a day  Singulair 10 mg oral tablet: 1 tab(s) orally once a day  spironolactone 25 mg oral tablet: 1 tab(s) orally once a day   acetaminophen 325 mg oral tablet: 2 tab(s) orally every 6 hours As needed Temp greater or equal to 38C (100.4F), Mild Pain (1 - 3), Moderate Pain (4 - 6)  aspirin 81 mg oral tablet: 1 tab(s) orally once a day  atorvastatin 80 mg oral tablet: 1 tab(s) orally once a day  budesonide-formoterol 80 mcg-4.5 mcg/inh inhalation aerosol: 2 puff(s) inhaled 2 times a day  carvedilol 6.25 mg oral tablet: 1 tab(s) orally 2 times a day  doxycycline monohydrate 50 mg oral capsule: 2 cap(s) orally every 12 hours end date 5/19/24.  Eliquis 5 mg oral tablet: 1 tab(s) orally 2 times a day  empagliflozin 10 mg oral tablet: 1 tab(s) orally once a day  levalbuterol 0.63 mg/3 mL inhalation solution: 3 milliliter(s) inhaled every 6 hours as needed for shortness of breath and/or wheezing  Protonix 40 mg oral delayed release tablet: 1 tab(s) orally once a day  Singulair 10 mg oral tablet: 1 tab(s) orally once a day

## 2024-05-10 NOTE — PHYSICAL THERAPY INITIAL EVALUATION ADULT - ASSISTIVE DEVICE FOR TRANSFER: GAIT, REHAB EVAL
Creatinine is normal at 0.6. Currently creatinine elevated to 1.75 with not anion gap metabolic acidosis noted secondary dehydration and decreased oral intake. Placed On IV fluid hydration monitor for urinary retention. Hand held assistx1

## 2024-05-10 NOTE — PROGRESS NOTE ADULT - PROBLEM SELECTOR PLAN 1
Pt with 1 month of worsening left heel pain. Xray of left foot with no evidence of osteomyelitis, gas, or foreign body. On exam, left foot with plantar heel open wound to dermis, no signs of infection. ESR 58 and CRP 8.5. Pt afebrile, did not meet SIRS criteria.  - Podiatry consulted on admission. Per discussion with Podiatry, no signs of infection but recommend antibiotics (i.e., Augmentin) for prophylaxis given presence of open wound. C/w PO doxycycline 100 mg BID instead of Augmentin, as there is possibility of pt having AIN from recent amoxicillin use (plan as below for CHRISTA on CKD).  - Local wound care with betadine/mupirocin and Band-Aid daily  - F/u blood cxs  - Pain control with PO Tylenol PRN for mild to moderate pain and oxycodone 5 mg q6hrs PRN for severe pain  - Obtain MRI left foot w/wo contrast if pt spikes fever, pain persists/worsens, or ESR/CRP trend up  - Trend ESR and CRP, as both mildly elevated  - Fall risk protocol

## 2024-05-10 NOTE — DISCHARGE NOTE PROVIDER - CARE PROVIDER_API CALL
Royce Oconnor  Internal Medicine  [address] [place] [address]  Phone: [phone]  Fax: [phone]  Established Patient  Follow Up Time: 2 weeks    Waterhouse, Joseph Cameron  Podiatric Medicine and Surgery  [address] [place] [id number]  Phone: [phone]  Fax: [phone]  Follow Up Time: 1 week

## 2024-05-10 NOTE — PROGRESS NOTE ADULT - PROBLEM SELECTOR PLAN 3
WBC 12.43 on admission. Pt with history of chronic leukocytosis. Pt currently afebrile, so lower suspicion for acute infection as reason for leukocytosis, more likely reactive vs. hemoconcentration.   - Trend CBC with diff daily  - F/u blood cxs and urine cx  - C/w PO doxycycline 100 mg BID as above  - Pt with eosinophilia (20%). Can be from AIN, but pt also with history of eosinophilia in 9/2023, found to have elevated IgE at that time. Possible referral to A&I as outpatient.

## 2024-05-10 NOTE — PATIENT PROFILE ADULT - FALL HARM RISK - HARM RISK INTERVENTIONS
Assistance with ambulation/Assistance OOB with selected safe patient handling equipment/Communicate Risk of Fall with Harm to all staff/Reinforce activity limits and safety measures with patient and family/Review medications for side effects contributing to fall risk/Sit up slowly, dangle for a short time, stand at bedside before walking/Tailored Fall Risk Interventions/Toileting schedule using arm’s reach rule for commode and bathroom/Visual Cue: Yellow wristband and red socks/Bed in lowest position, wheels locked, appropriate side rails in place/Call bell, personal items and telephone in reach/Instruct patient to call for assistance before getting out of bed or chair/Non-slip footwear when patient is out of bed/[place] to call system/Physically safe environment - no spills, clutter or unnecessary equipment/Purposeful Proactive Rounding/Room/bathroom lighting operational, light cord in reach

## 2024-05-10 NOTE — PROGRESS NOTE ADULT - PROBLEM SELECTOR PLAN 5
Pt with history of HFrecEF, with EF previously 40-45% in 11/2023, now 57% on 5/6/24. Both pt and pt's son do not know all of pt's home medications. Pt's son initially reported that there have been no changes to pt's home medications since Med Rec was performed on 1/2/24. However, pt's son later remarks pt is no longer on any diuretics, which were listed as home medications on Med Rec from 1/2/24. Also unsure if pt still on Entresto given recovery of EF.   - Hold Entresto, PO Lasix, and spironolactone for now 2/2 CHRISTA (per pharmacy patient may have recently been instructed to hold these medications 2/2 Cr of 1.8 noted on 4/16)  - C/w Coreg 6.25 mg BID  - Monitor with I&Os and daily weights  - Salt restriction

## 2024-05-10 NOTE — PHYSICAL THERAPY INITIAL EVALUATION ADULT - GENERAL OBSERVATIONS, REHAB EVAL
Pt encountered in semisupine position, no distress, AxOx4, with +IV. Pt agreeable to participate in PT evaluation. Vitals taken; /50mmHg.

## 2024-05-10 NOTE — PROGRESS NOTE ADULT - PROBLEM SELECTOR PLAN 2
SCr 1.75 on admission, improved to 1.58 after 2L bolus of NS in ED. Unclear recent baseline SCr, was 1.15-1.32 in Nov-Dec 2023. Possibly pre-renal CHRISTA given improvement in SCr with IVF. Can also be from AIN 2/2 recent amoxicillin use, as pt with eosinophilia on admission.   - Check urine lytes  - US kidney without hydro, notable for R nonobstructing renal calculus  - Hold off on additional IVF for now, as pt with history of CHF. Encourage PO hydration. Hold any diuretics as below.   - Hold amoxicillin/Augmentin given possibility of AIN   - Monitor SCr and urine output  - Avoid NSAIDs and nephrotoxic agents  - Renally dose meds  - nephrology following, recs appreciated

## 2024-05-10 NOTE — CONSULT NOTE ADULT - ASSESSMENT
70 yo man, Belarusian speaking, with history of afib s/p ablation (1/2/24), on Eliquis, HFrecEF (EF previously 40-45% in 11/2023, now 57% on 5/6/24), CAD s/p stent (2014), COPD/asthma, HTN, type 2 DM (not on insulin) c/b gastroparesis, CKD, and nephrolithiasis presents with 1 month of worsening left heel pain.  Nephrology consulted for CHRISTA.    A/P:  CHRISTA on CKD 3:  Pt known to our practice but lost to follow up.  Review of outpatient labs show baseline S.Cr. 0.8-1.1 (2023).  [place] labs show baseline trend S.Cr. 1.1-1.3.  Admitted w/ S. Cr. 1.75.  CHRISTA possibly sec to AIN from amoxicillin.  + eosinophilia on admission.  Check urine urea nitrogen and urine cr.  Renal US 5/10 - neg for hydro.  Has nonobstructing calculus measuring 1.5 cm on R kidney.  TTE on 5/7 - EF 57%.  On lasix, entresto, aldactone, Jardiance, and metformin at home -- all held.  UA w/ small blood; no RBCs.  Check CK level.  Renal function improving.  Monitor BMP and UO.  Avoid further nephrotoxins, NSAIDS RCA    HTN:  BP controlled.  Lasix, entresto, aldactone - held.  C/W carvedilol 6.25mg BID --> up titrate as needed.  Low salt diet.  Monitor BP.    Hypocalcemia:  Marginal.  Check PTH and Vit. D 25OH level.  Monitor Ca.    Hematuria:  UA w/ small blood; 0 RBCs.  + nephrolithiasis.  Check CK.  Monitor. 68 yo man, Polish speaking, with history of afib s/p ablation (1/2/24), on Eliquis, HFrecEF (EF previously 40-45% in 11/2023, now 57% on 5/6/24), CAD s/p stent (2014), COPD/asthma, HTN, type 2 DM (not on insulin) c/b gastroparesis, CKD, and nephrolithiasis presents with 1 month of worsening left heel pain.  Nephrology consulted for CHRISTA.    A/P:  CHRISTA on CKD 2:  Pt known to our practice but lost to follow up.  Review of outpatient labs show baseline S.Cr. 0.8-1.1 (2023).  [place] labs show baseline trend S.Cr. 1.1-1.3.  Admitted w/ S. Cr. 1.75.  CHRISTA possibly sec to AIN from amoxicillin.  + eosinophilia on admission.  Check urine urea nitrogen and urine cr.  Renal US 5/10 - neg for hydro.  Has nonobstructing calculus measuring 1.5 cm on R kidney.  TTE on 5/7 - EF 57%.  On lasix, entresto, aldactone, Jardiance, and metformin at home -- all held.  UA w/ small blood; no RBCs.  Check CK level.  Renal function improving.  Monitor BMP and UO.  Avoid further nephrotoxins, NSAIDS RCA    HTN:  BP controlled.  Lasix, entresto, aldactone - held.  C/W carvedilol 6.25mg BID --> up titrate as needed.  Low salt diet.  Monitor BP.    Acidosis:  NonAG.  Optimize glycemic control.  Monitor CO2.    Hypocalcemia:  Marginal.  Check PTH and Vit. D 25OH level.  Monitor Ca.    Hematuria:  UA w/ small blood; 0 RBCs.  + nephrolithiasis.  Check CK.  Monitor. 70 yo man, Serbian speaking, with history of afib s/p ablation (1/2/24), on Eliquis, HFrecEF (EF previously 40-45% in 11/2023, now 57% on 5/6/24), CAD s/p stent (2014), COPD/asthma, HTN, type 2 DM (not on insulin) c/b gastroparesis, CKD, and nephrolithiasis presents with 1 month of worsening left heel pain.  Nephrology consulted for CHRISTA.    A/P:  CHRISTA on CKD 2:  Pt known to our practice but lost to follow up.  Review of outpatient labs show baseline S.Cr. 0.8-1.1 (2023).  [place] labs show baseline trend S.Cr. 1.1-1.3.  Admitted w/ S. Cr. 1.75.  CHRISTA possibly sec to AIN from amoxicillin.  + eosinophilia on admission.  Check urine urea nitrogen and urine cr.  Renal US 5/10 - neg for hydro.  Has nonobstructing calculus measuring 1.5 cm on R kidney.  TTE on 5/7 - EF 57%.  On lasix, entresto, aldactone, Jardiance, and metformin at home -- all held.  UA w/ small blood; no RBCs.  Check CK level.  Renal function improving.  Monitor BMP and UO.  Avoid further nephrotoxins, NSAIDS RCA    HTN:  BP controlled.  Lasix, entresto, aldactone - held.  C/W carvedilol 6.25mg BID --> up titrate as needed.  Low salt diet.  Monitor BP.    Acidosis:  NonAG.  Sec to hyperglycemia and lactic acidosis.  Optimize glycemic control.  Lactate improved s/p 2L NS 0.9% bolus.  Monitor CO2.    Hypocalcemia:  Marginal.  Check PTH and Vit. D 25OH level.  Monitor Ca.    Hematuria:  UA w/ small blood; 0 RBCs.  + nephrolithiasis.  Check CK.  Monitor. 68 yo man, Serbian speaking, with history of afib s/p ablation (1/2/24), on Eliquis, HFrecEF (EF previously 40-45% in 11/2023, now 57% on 5/6/24), CAD s/p stent (2014), COPD/asthma, HTN, type 2 DM (not on insulin) c/b gastroparesis, CKD, and nephrolithiasis presents with 1 month of worsening left heel pain.  Nephrology consulted for CHRISTA.    A/P:  CHRISTA on CKD 2:  Pt known to our practice but lost to follow up.  Review of outpatient labs show baseline S.Cr. 0.8-1.1 (2023).  [place] labs show baseline trend S.Cr. 1.1-1.3.  Admitted w/ S. Cr. 1.75.  CHRISTA possibly sec to AIN from amoxicillin vs pre renal   + eosinophilia on admission.  Check urine urea nitrogen and urine cr.  Renal US 5/10 - neg for hydro.  Has nonobstructing calculus measuring 1.5 cm on R kidney.  TTE on 5/7 - EF 57%.  On lasix, entresto, aldactone, Jardiance, and metformin at home -- all held.  UA w/ small blood; no RBCs.  Check CK level.  Renal function improving.  Monitor BMP and UO.  Avoid further nephrotoxins, NSAIDS RCA    HTN:  BP controlled.  Lasix, entresto, aldactone - held.  C/W carvedilol 6.25mg BID --> up titrate as needed.  Low salt diet.  Monitor BP.    Acidosis:  NonAG.  Sec to hyperglycemia and lactic acidosis.  Optimize glycemic control.  Lactate improved s/p 2L NS 0.9% bolus.  Monitor CO2.    Hypocalcemia:  Marginal.  Check PTH and Vit. D 25OH level.  Monitor Ca.    Hematuria:  UA w/ small blood; 0 RBCs.  + nephrolithiasis.  Check CK.  Monitor.

## 2024-05-11 ENCOUNTER — TRANSCRIPTION ENCOUNTER (OUTPATIENT)
Age: 69
End: 2024-05-11

## 2024-05-11 VITALS
DIASTOLIC BLOOD PRESSURE: 53 MMHG | HEART RATE: 73 BPM | RESPIRATION RATE: 17 BRPM | TEMPERATURE: 97 F | OXYGEN SATURATION: 99 % | SYSTOLIC BLOOD PRESSURE: 115 MMHG

## 2024-05-11 LAB
ANION GAP SERPL CALC-SCNC: 13 MMOL/L — SIGNIFICANT CHANGE UP (ref 7–14)
BASOPHILS # BLD AUTO: 0.12 K/UL — SIGNIFICANT CHANGE UP (ref 0–0.2)
BASOPHILS NFR BLD AUTO: 0.9 % — SIGNIFICANT CHANGE UP (ref 0–2)
BUN SERPL-MCNC: 26 MG/DL — HIGH (ref 7–23)
CALCIUM SERPL-MCNC: 8.6 MG/DL — SIGNIFICANT CHANGE UP (ref 8.4–10.5)
CHLORIDE SERPL-SCNC: 107 MMOL/L — SIGNIFICANT CHANGE UP (ref 98–107)
CK SERPL-CCNC: 47 U/L — SIGNIFICANT CHANGE UP (ref 30–200)
CO2 SERPL-SCNC: 19 MMOL/L — LOW (ref 22–31)
CREAT SERPL-MCNC: 1.5 MG/DL — HIGH (ref 0.5–1.3)
CULTURE RESULTS: NO GROWTH — SIGNIFICANT CHANGE UP
EGFR: 49 ML/MIN/1.73M2 — LOW
EOSINOPHIL # BLD AUTO: 3.01 K/UL — HIGH (ref 0–0.5)
EOSINOPHIL NFR BLD AUTO: 22.4 % — HIGH (ref 0–6)
GLUCOSE BLDC GLUCOMTR-MCNC: 259 MG/DL — HIGH (ref 70–99)
GLUCOSE BLDC GLUCOMTR-MCNC: 271 MG/DL — HIGH (ref 70–99)
GLUCOSE SERPL-MCNC: 184 MG/DL — HIGH (ref 70–99)
HCT VFR BLD CALC: 32.6 % — LOW (ref 39–50)
HGB BLD-MCNC: 11.6 G/DL — LOW (ref 13–17)
IANC: 6.75 K/UL — SIGNIFICANT CHANGE UP (ref 1.8–7.4)
IMM GRANULOCYTES NFR BLD AUTO: 1 % — HIGH (ref 0–0.9)
LYMPHOCYTES # BLD AUTO: 19 % — SIGNIFICANT CHANGE UP (ref 13–44)
LYMPHOCYTES # BLD AUTO: 2.55 K/UL — SIGNIFICANT CHANGE UP (ref 1–3.3)
MAGNESIUM SERPL-MCNC: 2 MG/DL — SIGNIFICANT CHANGE UP (ref 1.6–2.6)
MCHC RBC-ENTMCNC: 31.5 PG — SIGNIFICANT CHANGE UP (ref 27–34)
MCHC RBC-ENTMCNC: 35.6 GM/DL — SIGNIFICANT CHANGE UP (ref 32–36)
MCV RBC AUTO: 88.6 FL — SIGNIFICANT CHANGE UP (ref 80–100)
MONOCYTES # BLD AUTO: 0.86 K/UL — SIGNIFICANT CHANGE UP (ref 0–0.9)
MONOCYTES NFR BLD AUTO: 6.4 % — SIGNIFICANT CHANGE UP (ref 2–14)
NEUTROPHILS # BLD AUTO: 6.75 K/UL — SIGNIFICANT CHANGE UP (ref 1.8–7.4)
NEUTROPHILS NFR BLD AUTO: 50.3 % — SIGNIFICANT CHANGE UP (ref 43–77)
NRBC # BLD: 0 /100 WBCS — SIGNIFICANT CHANGE UP (ref 0–0)
NRBC # FLD: 0 K/UL — SIGNIFICANT CHANGE UP (ref 0–0)
PHOSPHATE SERPL-MCNC: 3.3 MG/DL — SIGNIFICANT CHANGE UP (ref 2.5–4.5)
PLATELET # BLD AUTO: 253 K/UL — SIGNIFICANT CHANGE UP (ref 150–400)
POTASSIUM SERPL-MCNC: 4.2 MMOL/L — SIGNIFICANT CHANGE UP (ref 3.5–5.3)
POTASSIUM SERPL-SCNC: 4.2 MMOL/L — SIGNIFICANT CHANGE UP (ref 3.5–5.3)
PTH-INTACT FLD-MCNC: 70 PG/ML — HIGH (ref 15–65)
RBC # BLD: 3.68 M/UL — LOW (ref 4.2–5.8)
RBC # FLD: 14.2 % — SIGNIFICANT CHANGE UP (ref 10.3–14.5)
SODIUM SERPL-SCNC: 139 MMOL/L — SIGNIFICANT CHANGE UP (ref 135–145)
SPECIMEN SOURCE: SIGNIFICANT CHANGE UP
VIT D25+D1,25 OH+D1,25 PNL SERPL-MCNC: 38.3 PG/ML — SIGNIFICANT CHANGE UP (ref 19.9–79.3)
WBC # BLD: 13.42 K/UL — HIGH (ref 3.8–10.5)
WBC # FLD AUTO: 13.42 K/UL — HIGH (ref 3.8–10.5)

## 2024-05-11 PROCEDURE — 99239 HOSP IP/OBS DSCHRG MGMT >30: CPT

## 2024-05-11 RX ORDER — LEVALBUTEROL 1.25 MG/.5ML
3 SOLUTION, CONCENTRATE RESPIRATORY (INHALATION)
Qty: 360 | Refills: 0
Start: 2024-05-11 | End: 2024-06-09

## 2024-05-11 RX ORDER — SACUBITRIL AND VALSARTAN 24; 26 MG/1; MG/1
1 TABLET, FILM COATED ORAL
Refills: 0 | DISCHARGE

## 2024-05-11 RX ORDER — FUROSEMIDE 40 MG
1 TABLET ORAL
Refills: 0 | DISCHARGE

## 2024-05-11 RX ORDER — MOMETASONE FUROATE AND FORMOTEROL FUMARATE DIHYDRATE 200; 5 UG/1; UG/1
2 AEROSOL RESPIRATORY (INHALATION)
Refills: 0 | DISCHARGE

## 2024-05-11 RX ORDER — BUDESONIDE AND FORMOTEROL FUMARATE DIHYDRATE 160; 4.5 UG/1; UG/1
2 AEROSOL RESPIRATORY (INHALATION)
Qty: 1 | Refills: 0
Start: 2024-05-11 | End: 2024-06-09

## 2024-05-11 RX ORDER — METFORMIN HYDROCHLORIDE 850 MG/1
1 TABLET ORAL
Refills: 0 | DISCHARGE

## 2024-05-11 RX ORDER — SPIRONOLACTONE 25 MG/1
1 TABLET, FILM COATED ORAL
Refills: 0 | DISCHARGE

## 2024-05-11 RX ORDER — ACETAMINOPHEN 500 MG
2 TABLET ORAL
Qty: 0 | Refills: 0 | DISCHARGE
Start: 2024-05-11

## 2024-05-11 RX ADMIN — CARVEDILOL PHOSPHATE 6.25 MILLIGRAM(S): 80 CAPSULE, EXTENDED RELEASE ORAL at 09:43

## 2024-05-11 RX ADMIN — PANTOPRAZOLE SODIUM 40 MILLIGRAM(S): 20 TABLET, DELAYED RELEASE ORAL at 05:37

## 2024-05-11 RX ADMIN — APIXABAN 5 MILLIGRAM(S): 2.5 TABLET, FILM COATED ORAL at 09:43

## 2024-05-11 RX ADMIN — Medication 81 MILLIGRAM(S): at 09:43

## 2024-05-11 RX ADMIN — Medication 100 MILLIGRAM(S): at 05:37

## 2024-05-11 RX ADMIN — BUDESONIDE AND FORMOTEROL FUMARATE DIHYDRATE 2 PUFF(S): 160; 4.5 AEROSOL RESPIRATORY (INHALATION) at 09:42

## 2024-05-11 RX ADMIN — MONTELUKAST 10 MILLIGRAM(S): 4 TABLET, CHEWABLE ORAL at 09:43

## 2024-05-11 RX ADMIN — Medication 6: at 09:51

## 2024-05-11 NOTE — PROGRESS NOTE ADULT - ASSESSMENT
70 yo man, Arabic speaking, with history of afib s/p ablation (1/2/24), on Eliquis, HFrecEF (EF previously 40-45% in 11/2023, now 57% on 5/6/24), CAD s/p stent (2014), COPD/asthma, HTN, type 2 DM (not on insulin) c/b gastroparesis, CKD, and nephrolithiasis presents with 1 month of worsening left heel pain.  Nephrology consulted for CHRISTA.    A/P:  CHRISTA on CKD 2:  Pt known to our practice but lost to follow up.  Review of outpatient labs show baseline S.Cr. 0.8-1.1 (2023).  [place] labs show baseline trend S.Cr. 1.1-1.3.  Admitted w/ S. Cr. 1.75.  CHRISTA possibly sec to AIN from amoxicillin vs pre renal   + eosinophilia on admission.  Check urine urea nitrogen and urine cr.  Renal US 5/10 - neg for hydro.  Has nonobstructing calculus measuring 1.5 cm on R kidney.  TTE on 5/7 - EF 57%.  On lasix, entresto, aldactone, Jardiance, and metformin at home -- all held.  UA w/ small blood; no RBCs.  Check CK level.  Renal function improving.  Monitor BMP and UO.  Avoid further nephrotoxins, NSAIDS RCA    HTN:  BP controlled.  Lasix, entresto, aldactone - held.  C/W carvedilol 6.25mg BID --> up titrate as needed.  Low salt diet.  Monitor BP.    Acidosis:  NonAG.  Sec to hyperglycemia and lactic acidosis.  Optimize glycemic control.  Lactate improved s/p 2L NS 0.9% bolus.  Monitor CO2.    Hypocalcemia:  Marginal.  5/11 PTH 70   Check Vit. D 25OH level.  Monitor Ca.    Hematuria:  UA w/ small blood; 0 RBCs.  + nephrolithiasis.  Check CK.  Monitor.
69M presents with L plantar heel wound to dermis  - Pt seen and evaluated with son bedside translating for entire consult   - L foot posterior plantar heel wound to dermis with overlying eschar, no erythema, no drainage, no purulence, no fluctuance no palpable retained foreign body. R foot no open wounds or signs of infection.   - L foot XR: no gas, no OM, no FB  - No culture 2/2 no drainage, no signs of infection and likely skin contaminant   - Foot not likely source of leukocytosis   - Rec transition on discharge to PO Augmentin 875 x10 days   - Dispense surgical shoe left foot   - Local wound care with betadine/ mupirocin and bandaid daily   - Stable for D/c from podiatry standpoint to f.u outpt with Dr Waterhouse   - Please call [phone] to make an appointment and f.u within 1 week   - Will follow during admission, stable
70 yo man, Malay speaking, with history of afib s/p ablation (1/2/24), on Eliquis, HFrecEF (EF previously 40-45% in 11/2023, now 57% on 5/6/24), CAD s/p stent (2014), COPD/asthma, HTN, type 2 DM (not on insulin) c/b gastroparesis, CKD, and nephrolithiasis presents with 1 month of worsening left heel pain, found to have CHRISTA on CKD, admitted for further workup.

## 2024-05-11 NOTE — DISCHARGE NOTE NURSING/CASE MANAGEMENT/SOCIAL WORK - PATIENT PORTAL LINK FT
You can access the FollowMyHealth Patient Portal offered by [facility] by registering at the following website: [email or web address]. By joining [employer]’s FollowMyHealth portal, you will also be able to view your health information using other applications (apps) compatible with our system.

## 2024-05-11 NOTE — PROGRESS NOTE ADULT - SUBJECTIVE AND OBJECTIVE BOX
Patient is a 69y old  Male who presents with a chief complaint of Left heel pain, CHRISTA on CKD (09 May 2024 23:42)       INTERVAL HPI/OVERNIGHT EVENTS:  Patient seen and evaluated at bedside.  Pt is resting comfortable in NAD. Denies N/V/F/C.  Pain rated at X/10    Allergies    No Known Allergies    Intolerances        Vital Signs Last 24 Hrs  T(C): 36.4 (10 May 2024 09:06), Max: 36.7 (09 May 2024 14:50)  T(F): 97.5 (10 May 2024 09:06), Max: 98.1 (09 May 2024 22:14)  HR: 78 (10 May 2024 09:06) (70 - 79)  BP: 119/63 (10 May 2024 09:06) (96/42 - 149/73)  BP(mean): 90 (09 May 2024 18:54) (86 - 90)  RR: 16 (10 May 2024 09:06) (16 - 20)  SpO2: 98% (10 May 2024 09:06) (98% - 100%)    Parameters below as of 10 May 2024 09:06  Patient On (Oxygen Delivery Method): room air        LABS:                        12.3   12.43 )-----------( 260      ( 09 May 2024 14:33 )             36.0     05-    138  |  107  |  23  ----------------------------<  223<H>  4.1   |  18<L>  |  1.58<H>    Ca    8.1<L>      09 May 2024 18:52  Phos  3.5     05-  Mg     2.00     -    TPro  7.7  /  Alb  4.3  /  TBili  0.3  /  DBili  x   /  AST  14  /  ALT  12  /  AlkPhos  109  05-09    PT/INR - ( 09 May 2024 14:33 )   PT: 13.3 sec;   INR: 1.19 ratio         PTT - ( 09 May 2024 14:33 )  PTT:45.5 sec  Urinalysis Basic - ( 09 May 2024 18:52 )    Color: Yellow / Appearance: Clear / S.016 / pH: x  Gluc: 223 mg/dL / Ketone: Negative mg/dL  / Bili: Negative / Urobili: 0.2 mg/dL   Blood: x / Protein: Trace mg/dL / Nitrite: Negative   Leuk Esterase: Negative / RBC: 0 /HPF / WBC 0 /HPF   Sq Epi: x / Non Sq Epi: 0 /HPF / Bacteria: Negative /HPF      CAPILLARY BLOOD GLUCOSE      POCT Blood Glucose.: 136 mg/dL (10 May 2024 08:48)  POCT Blood Glucose.: 339 mg/dL (09 May 2024 23:53)  POCT Blood Glucose.: 243 mg/dL (09 May 2024 20:27)  POCT Blood Glucose.: 388 mg/dL (09 May 2024 11:31)      Lower Extremity Physical Exam:  Vascular: DP/PT 1/4, B/L, CFT <3 seconds B/L, Temperature gradient warm to warm, B/L.   Neuro: Epicritic sensation intact to the level of digits, B/L.  Musculoskeletal/Ortho: unremarkable   Skin: L foot posterior plantar heel wound to dermis with overlying eschar, no erythema, no drainage, no purulence, no flucutance, no palpable retained foreign body. R foot no open wounds or signs of infection.     RADIOLOGY & ADDITIONAL TESTS:  
[facility]   MD FE COUGHLIN MD MARIA SANTIAGO, NP    TEL:  OFFICE: [phone]  From 5pm-7am Answering Service [phone]    -- RENAL FOLLOW UP NOTE ---Date of Service 05-11-24 @ 13:54    Patient is a 69y old  Male who presents with a chief complaint of Left heel pain, CHRISTA on CKD       Patient seen and examined at bedside. No chest pain/sob    VITALS:  T(F): 97.2 (05-11-24 @ 11:27), Max: 98.3 (05-11-24 @ 05:33)  HR: 73 (05-11-24 @ 11:27)  BP: 115/53 (05-11-24 @ 11:27)  RR: 17 (05-11-24 @ 11:27)  SpO2: 99% (05-11-24 @ 11:27)  Wt(kg): --    Height (cm): 162.6 (05-10 @ 15:35)  Weight (kg): 62.3 (05-10 @ 15:35)  BMI (kg/m2): 23.6 (05-10 @ 15:35)  BSA (m2): 1.67 (05-10 @ 15:35)    PHYSICAL EXAM:  General: NAD  Neck: No JVD  Respiratory: CTAB, no wheezes, rales or rhonchi  Cardiovascular: S1, S2, RRR  Gastrointestinal: BS+, soft, NT/ND  Extremities: No peripheral edema    Hospital Medications:   MEDICATIONS  (STANDING):  apixaban 5 milliGRAM(s) Oral every 12 hours  aspirin enteric coated 81 milliGRAM(s) Oral daily  atorvastatin 80 milliGRAM(s) Oral at bedtime  budesonide  80 MICROgram(s)/formoterol 4.5 MICROgram(s) Inhaler 2 Puff(s) Inhalation two times a day  carvedilol 6.25 milliGRAM(s) Oral every 12 hours  dextrose 10% Bolus 125 milliLiter(s) IV Bolus once  dextrose 5%. 1000 milliLiter(s) (100 mL/Hr) IV Continuous <Continuous>  dextrose 5%. 1000 milliLiter(s) (50 mL/Hr) IV Continuous <Continuous>  dextrose 50% Injectable 25 Gram(s) IV Push once  dextrose 50% Injectable 12.5 Gram(s) IV Push once  doxycycline monohydrate Capsule 100 milliGRAM(s) Oral every 12 hours  glucagon  Injectable 1 milliGRAM(s) IntraMuscular once  insulin lispro (ADMELOG) corrective regimen sliding scale   SubCutaneous three times a day before meals  insulin lispro (ADMELOG) corrective regimen sliding scale   SubCutaneous at bedtime  montelukast 10 milliGRAM(s) Oral daily  pantoprazole    Tablet 40 milliGRAM(s) Oral before breakfast      LABS:  05-11    139  |  107  |  26<H>  ----------------------------<  184<H>  4.2   |  19<L>  |  1.50<H>    Ca    8.6      11 May 2024 06:22  Phos  3.3     05-11  Mg     2.00     05-11    TPro  7.7  /  Alb  4.3  /  TBili  0.3  /  DBili      /  AST  14  /  ALT  12  /  AlkPhos  109  05-09    Creatinine Trend: 1.50 <--, 1.58 <--, 1.75 <--    Phosphorus: 3.3 mg/dL (05-11 @ 06:22)    calcium--  intact pth70  parathyroid hormone intact, serum--                            11.6   13.42 )-----------( 253      ( 11 May 2024 06:22 )             32.6     Urine Studies:  Urinalysis - [05-11-24 @ 06:22]      Color  / Appearance  / SG  / pH       Gluc 184 / Ketone   / Bili  / Urobili        Blood  / Protein  / Leuk Est  / Nitrite       RBC  / WBC  / Hyaline  / Gran  / Sq Epi  / Non Sq Epi  / Bacteria     Urine Creatinine 46      [05-10-24 @ 17:33]  Urine Urea Nitrogen 327.0      [05-10-24 @ 17:33]    PTH -- (Ca --)      [05-11-24 @ 06:22]   70        RADIOLOGY & ADDITIONAL STUDIES:  
[facility] Department of Hospital Medicine  Los Hillman MD  Available on MS Teams  Pager: [phone]    Patient is a 69y old  Male who presents with a chief complaint of Left heel pain, CHRISTA on CKD (10 May 2024 14:54)    OVERNIGHT EVENTS: No acute events overnight.    SUBJECTIVE: Pt seen and examined at bedside this morning. Patient son also present at bedside. Patient reports that he feels much better. Endorses minimal pain over his left heal. Denies any fever, chills, nausea or vomiting. Eager to go home.    ADDITIONAL REVIEW OF SYSTEMS: as above.    MEDICATIONS  (STANDING):  apixaban 5 milliGRAM(s) Oral every 12 hours  aspirin enteric coated 81 milliGRAM(s) Oral daily  atorvastatin 80 milliGRAM(s) Oral at bedtime  budesonide  80 MICROgram(s)/formoterol 4.5 MICROgram(s) Inhaler 2 Puff(s) Inhalation two times a day  carvedilol 6.25 milliGRAM(s) Oral every 12 hours  dextrose 10% Bolus 125 milliLiter(s) IV Bolus once  dextrose 5%. 1000 milliLiter(s) (100 mL/Hr) IV Continuous <Continuous>  dextrose 5%. 1000 milliLiter(s) (50 mL/Hr) IV Continuous <Continuous>  dextrose 50% Injectable 25 Gram(s) IV Push once  dextrose 50% Injectable 12.5 Gram(s) IV Push once  doxycycline monohydrate Capsule 100 milliGRAM(s) Oral every 12 hours  glucagon  Injectable 1 milliGRAM(s) IntraMuscular once  insulin lispro (ADMELOG) corrective regimen sliding scale   SubCutaneous three times a day before meals  insulin lispro (ADMELOG) corrective regimen sliding scale   SubCutaneous at bedtime  montelukast 10 milliGRAM(s) Oral daily  pantoprazole    Tablet 40 milliGRAM(s) Oral before breakfast    MEDICATIONS  (PRN):  acetaminophen     Tablet .. 650 milliGRAM(s) Oral every 6 hours PRN Temp greater or equal to 38C (100.4F), Mild Pain (1 - 3), Moderate Pain (4 - 6)  dextrose Oral Gel 15 Gram(s) Oral once PRN Blood Glucose LESS THAN 70 milliGRAM(s)/deciliter  levalbuterol Inhalation 0.63 milliGRAM(s) Inhalation every 6 hours PRN shortness of breath and/or wheezing  oxyCODONE    IR 5 milliGRAM(s) Oral every 6 hours PRN Severe Pain (7 - 10)    CAPILLARY BLOOD GLUCOSE    POCT Blood Glucose.: 230 mg/dL (10 May 2024 12:33)  POCT Blood Glucose.: 136 mg/dL (10 May 2024 08:48)  POCT Blood Glucose.: 339 mg/dL (09 May 2024 23:53)  POCT Blood Glucose.: 243 mg/dL (09 May 2024 20:27)    I&O's Summary    PHYSICAL EXAM:  Vital Signs Last 24 Hrs  T(C): 36.4 (10 May 2024 09:06), Max: 36.7 (09 May 2024 18:54)  T(F): 97.5 (10 May 2024 09:06), Max: 98.1 (09 May 2024 22:14)  HR: 78 (10 May 2024 09:06) (70 - 78)  BP: 119/63 (10 May 2024 09:06) (96/42 - 134/68)  BP(mean): 90 (09 May 2024 18:54) (90 - 90)  RR: 16 (10 May 2024 09:06) (16 - 18)  SpO2: 98% (10 May 2024 09:06) (98% - 100%)    Parameters below as of 10 May 2024 09:06  Patient On (Oxygen Delivery Method): room air    CONSTITUTIONAL: NAD, well-developed  HEAD: Normocephalic, atraumatic  EYES: EOMI, PERRL  ENT: no rhinorrhea, no pharyngeal erythema  RESPIRATORY: No increased work of breathing, CTAB, no wheezes or crackles appreciated  CARDIOVASCULAR: RRR, S1 and S2 present, no m/r/g  ABDOMEN: soft, NT, ND, bowel sounds present  EXTREMITIES: No LE edema, left heel with small eschar, no drainage or surrounding erythema, minimal TTP  MUSCULOSKELETAL: no joint swelling, no tenderness to palpation  NEURO: A&Ox3, moving all extremities    LABS:                        12.3   12.43 )-----------( 260      ( 09 May 2024 14:33 )             36.0         138  |  107  |  23  ----------------------------<  223<H>  4.1   |  18<L>  |  1.58<H>    Ca    8.1<L>      09 May 2024 18:52  Phos  3.5     -  Mg     2.00         TPro  7.7  /  Alb  4.3  /  TBili  0.3  /  DBili  x   /  AST  14  /  ALT  12  /  AlkPhos  109  -    PT/INR - ( 09 May 2024 14:33 )   PT: 13.3 sec;   INR: 1.19 ratio      PTT - ( 09 May 2024 14:33 )  PTT:45.5 sec    Urinalysis Basic - ( 09 May 2024 18:52 )    Color: Yellow / Appearance: Clear / S.016 / pH: x  Gluc: 223 mg/dL / Ketone: Negative mg/dL  / Bili: Negative / Urobili: 0.2 mg/dL   Blood: x / Protein: Trace mg/dL / Nitrite: Negative   Leuk Esterase: Negative / RBC: 0 /HPF / WBC 0 /HPF   Sq Epi: x / Non Sq Epi: 0 /HPF / Bacteria: Negative /HPF    RADIOLOGY & ADDITIONAL TESTS:    Results Reviewed:   Imaging Personally Reviewed:  Electrocardiogram Personally Reviewed:    COORDINATION OF CARE:  Care Discussed with Consultants/Other Providers [Y/N]:  Prior or Outpatient Records Reviewed [Y/N]:

## 2024-05-11 NOTE — DISCHARGE NOTE NURSING/CASE MANAGEMENT/SOCIAL WORK - NSDCVIVACCINE_GEN_ALL_CORE_FT
Tdap; 09-May-2024 17:39; Essence Mayer); [employer]; [id number] (Exp. Date: 30-Nov-2025); IntraMuscular; Deltoid Left.; 0.5 milliLiter(s); VIS (VIS Published: 09-May-2013, VIS Presented: 09-May-2024);

## 2024-05-11 NOTE — DISCHARGE NOTE NURSING/CASE MANAGEMENT/SOCIAL WORK - NSDCFUADDAPPT_GEN_ALL_CORE_FT
You were evaluated by our podiatric surgeons (foot specialists) and they did not believe that you needed any surgical procedures for your heel wound. Please follow up with Dr. Joseph Waterhouse within 1 week of discharge from the hospital for further care. Please call [phone] to schedule an appointment.

## 2024-05-15 LAB
CULTURE RESULTS: SIGNIFICANT CHANGE UP
CULTURE RESULTS: SIGNIFICANT CHANGE UP
SPECIMEN SOURCE: SIGNIFICANT CHANGE UP
SPECIMEN SOURCE: SIGNIFICANT CHANGE UP

## 2024-11-04 ENCOUNTER — APPOINTMENT (OUTPATIENT)
Dept: ELECTROPHYSIOLOGY | Facility: CLINIC | Age: 69
End: 2024-11-04
Payer: MEDICAID

## 2024-11-04 ENCOUNTER — NON-APPOINTMENT (OUTPATIENT)
Age: 69
End: 2024-11-04

## 2024-11-04 VITALS
BODY MASS INDEX: 27.46 KG/M2 | TEMPERATURE: 98 F | HEIGHT: 63 IN | DIASTOLIC BLOOD PRESSURE: 72 MMHG | SYSTOLIC BLOOD PRESSURE: 127 MMHG | WEIGHT: 155 LBS | HEART RATE: 75 BPM | OXYGEN SATURATION: 97 %

## 2024-11-04 DIAGNOSIS — Z86.79 OTHER SPECIFIED POSTPROCEDURAL STATES: ICD-10-CM

## 2024-11-04 DIAGNOSIS — Z98.890 OTHER SPECIFIED POSTPROCEDURAL STATES: ICD-10-CM

## 2024-11-04 DIAGNOSIS — I10 ESSENTIAL (PRIMARY) HYPERTENSION: ICD-10-CM

## 2024-11-04 DIAGNOSIS — I48.19 OTHER PERSISTENT ATRIAL FIBRILLATION: ICD-10-CM

## 2024-11-04 PROCEDURE — 99214 OFFICE O/P EST MOD 30 MIN: CPT

## 2024-11-04 PROCEDURE — 93000 ELECTROCARDIOGRAM COMPLETE: CPT

## 2025-03-03 NOTE — CHART NOTE - NSCHARTNOTEFT_GEN_A_CORE
OVERNIGHT MEDICINE ACP COVERAGE    Notified by RN that pt went into rapid Afib while at rest in bed to 150s, now sustaining between 120s-140s. EKG done showing Afib RVR rate 133.   Pt was noted to have episode of rapid Afib in ED driven by hypoxia which spontaneously resolved. Roslyn  used, ID# [id number]. Pt denies any CP, palpitations, worsening SOB. Pt was laying in bed, at rest. Currently calm, in NAD. Cards: irregular rate/rhythm. Lungs: Wheezes noted B/L. Currently on NC.     Vital Signs Last 24 Hrs  T(C): 36.4 (07 Jan 2023 20:26), Max: 36.6 (07 Jan 2023 03:02)  T(F): 97.6 (07 Jan 2023 20:26), Max: 97.8 (07 Jan 2023 03:02)  HR: 160 (07 Jan 2023 20:26) (65 - 160)  BP: 114/75 (07 Jan 2023 20:26) (114/75 - 194/56)  BP(mean): --  RR: 20 (07 Jan 2023 20:26) (20 - 24)  SpO2: 100% (07 Jan 2023 20:26) (97% - 100%)    Parameters below as of 07 Jan 2023 20:26  Patient On (Oxygen Delivery Method): nasal cannula  O2 Flow (L/min): 2    -Cardizem 10mg IVP x1 ordered stat  -Will f/u with cards re: restarting AC    VIMAL Gutiérrez PA-C  [id number] OVERNIGHT MEDICINE ACP COVERAGE    Notified by RN that pt went into rapid Afib while at rest in bed to 150s, now sustaining between 120s-140s. EKG done showing Afib RVR rate 133.   Pt was noted to have episode of rapid Afib in ED driven by hypoxia which spontaneously resolved. Icelandic  used, ID# [id number]. Pt denies any CP, palpitations, worsening SOB. Pt was laying in bed, at rest. Currently calm, in NAD. Cards: irregular rate/rhythm. Lungs: Wheezes noted B/L. Currently on NC.     Vital Signs Last 24 Hrs  T(C): 36.4 (07 Jan 2023 20:26), Max: 36.6 (07 Jan 2023 03:02)  T(F): 97.6 (07 Jan 2023 20:26), Max: 97.8 (07 Jan 2023 03:02)  HR: 160 (07 Jan 2023 20:26) (65 - 160)  BP: 114/75 (07 Jan 2023 20:26) (114/75 - 194/56)  BP(mean): --  RR: 20 (07 Jan 2023 20:26) (20 - 24)  SpO2: 100% (07 Jan 2023 20:26) (97% - 100%)    Parameters below as of 07 Jan 2023 20:26  Patient On (Oxygen Delivery Method): nasal cannula  O2 Flow (L/min): 2    -Cardizem 10mg IVP x1 ordered stat  -was on duoneb q4 hrs, switched to xopenex q6 given tachycardia  -Will f/u with cards re: restarting CORAL Gutiérrez PA-C  [id number] OVERNIGHT MEDICINE ACP COVERAGE    Notified by RN that pt went into rapid Afib while at rest in bed to 150s, now sustaining between 120s-140s. EKG done showing Afib RVR rate 133.   Pt was noted to have episode of rapid Afib in ED driven by hypoxia which spontaneously resolved. Nepali  used, ID# [id number]. Pt denies any CP, palpitations, worsening SOB. Pt was laying in bed, at rest. Currently calm, in NAD. Cards: irregular rate/rhythm. Lungs: Wheezes noted B/L. Currently on NC.     Vital Signs Last 24 Hrs  T(C): 36.4 (07 Jan 2023 20:26), Max: 36.6 (07 Jan 2023 03:02)  T(F): 97.6 (07 Jan 2023 20:26), Max: 97.8 (07 Jan 2023 03:02)  HR: 160 (07 Jan 2023 20:26) (65 - 160)  BP: 114/75 (07 Jan 2023 20:26) (114/75 - 194/56)  BP(mean): --  RR: 20 (07 Jan 2023 20:26) (20 - 24)  SpO2: 100% (07 Jan 2023 20:26) (97% - 100%)    Parameters below as of 07 Jan 2023 20:26  Patient On (Oxygen Delivery Method): nasal cannula  O2 Flow (L/min): 2    -Cardizem 10mg IVP x1 ordered stat  -was on duoneb q4 hrs, switched to xopenex q6 given tachycardia  -Hep gtt restarted overnight, f/u with cards re: continuation of AC long term    VIMAL Gutiérrez PA-C  [id number] Tylenol after 4:30 pm

## 2025-05-15 ENCOUNTER — NON-APPOINTMENT (OUTPATIENT)
Age: 70
End: 2025-05-15

## 2025-05-15 ENCOUNTER — APPOINTMENT (OUTPATIENT)
Dept: ELECTROPHYSIOLOGY | Facility: CLINIC | Age: 70
End: 2025-05-15
Payer: MEDICAID

## 2025-05-15 VITALS
DIASTOLIC BLOOD PRESSURE: 66 MMHG | WEIGHT: 160 LBS | SYSTOLIC BLOOD PRESSURE: 121 MMHG | BODY MASS INDEX: 28.35 KG/M2 | HEART RATE: 78 BPM | HEIGHT: 63 IN | OXYGEN SATURATION: 97 %

## 2025-05-15 DIAGNOSIS — I10 ESSENTIAL (PRIMARY) HYPERTENSION: ICD-10-CM

## 2025-05-15 DIAGNOSIS — Z98.890 OTHER SPECIFIED POSTPROCEDURAL STATES: ICD-10-CM

## 2025-05-15 DIAGNOSIS — Z86.79 OTHER SPECIFIED POSTPROCEDURAL STATES: ICD-10-CM

## 2025-05-15 DIAGNOSIS — I48.19 OTHER PERSISTENT ATRIAL FIBRILLATION: ICD-10-CM

## 2025-05-15 PROCEDURE — G2211 COMPLEX E/M VISIT ADD ON: CPT | Mod: NC

## 2025-05-15 PROCEDURE — 99214 OFFICE O/P EST MOD 30 MIN: CPT

## 2025-05-15 PROCEDURE — 93000 ELECTROCARDIOGRAM COMPLETE: CPT

## 2025-06-05 NOTE — H&P ADULT - PROBLEM/PLAN-9
DISPLAY PLAN FREE TEXT
Patient requests all Lab, Cardiology, and Radiology Results on their Discharge Instructions